# Patient Record
Sex: MALE | Race: WHITE | Employment: FULL TIME | URBAN - NONMETROPOLITAN AREA
[De-identification: names, ages, dates, MRNs, and addresses within clinical notes are randomized per-mention and may not be internally consistent; named-entity substitution may affect disease eponyms.]

---

## 2017-08-18 ENCOUNTER — HOSPITAL ENCOUNTER (OUTPATIENT)
Age: 32
Discharge: HOME | End: 2017-08-18
Payer: COMMERCIAL

## 2017-08-18 LAB
ALBUMIN SERPL-MCNC: 5.1 G/DL (ref 3.5–5.1)
ALP BLD-CCNC: 71 U/L (ref 38–126)
ALT SERPL-CCNC: 35 U/L (ref 11–66)
ANION GAP SERPL CALCULATED.3IONS-SCNC: 16 MEQ/L (ref 8–16)
AST SERPL-CCNC: 23 U/L (ref 5–40)
BASOPHILS # BLD: 0.8 %
BASOPHILS ABSOLUTE: 0.1 THOU/MM3 (ref 0–0.1)
BILIRUB SERPL-MCNC: 1.1 MG/DL (ref 0.3–1.2)
BILIRUBIN DIRECT: < 0.2 MG/DL (ref 0–0.3)
BILIRUBIN URINE: NEGATIVE
BLOOD, URINE: NEGATIVE
BUN BLDV-MCNC: 20 MG/DL (ref 7–22)
CALCIUM SERPL-MCNC: 10.2 MG/DL (ref 8.5–10.5)
CHARACTER, URINE: CLEAR
CHLORIDE BLD-SCNC: 101 MEQ/L (ref 98–111)
CO2: 25 MEQ/L (ref 23–33)
COLOR: YELLOW
CREAT SERPL-MCNC: 1.6 MG/DL (ref 0.4–1.2)
CREATININE URINE: 201.9 MG/DL
EOSINOPHIL # BLD: 0.9 %
EOSINOPHILS ABSOLUTE: 0.1 THOU/MM3 (ref 0–0.4)
GFR SERPL CREATININE-BSD FRML MDRD: 50 ML/MIN/1.73M2
GLUCOSE BLD-MCNC: 89 MG/DL (ref 70–108)
GLUCOSE URINE: NEGATIVE MG/DL
HCT VFR BLD CALC: 51.4 % (ref 42–52)
HEMOGLOBIN: 17.7 GM/DL (ref 14–18)
KETONES, URINE: NEGATIVE
LEUKOCYTE ESTERASE, URINE: NEGATIVE
LYMPHOCYTES # BLD: 16.1 %
LYMPHOCYTES ABSOLUTE: 1.5 THOU/MM3 (ref 1–4.8)
MAGNESIUM: 2.2 MG/DL (ref 1.6–2.4)
MCH RBC QN AUTO: 30.7 PG (ref 27–31)
MCHC RBC AUTO-ENTMCNC: 34.4 GM/DL (ref 33–37)
MCV RBC AUTO: 89.1 FL (ref 80–94)
MONOCYTES # BLD: 10.1 %
MONOCYTES ABSOLUTE: 0.9 THOU/MM3 (ref 0.4–1.3)
NITRITE, URINE: NEGATIVE
NUCLEATED RED BLOOD CELLS: 0 /100 WBC
PATHOLOGIST REVIEW: 0
PDW BLD-RTO: 13.7 % (ref 11.5–14.5)
PH UA: 5.5
PHOSPHORUS: 2.2 MG/DL (ref 2.4–4.7)
PLATELET # BLD: 228 THOU/MM3 (ref 130–400)
PMV BLD AUTO: 9.4 MCM (ref 7.4–10.4)
POTASSIUM SERPL-SCNC: 3.8 MEQ/L (ref 3.5–5.2)
PROT/CREAT RATIO, UR: 0.11
PROTEIN UA: NEGATIVE
PROTEIN, URINE: 21.2 MG/DL
RBC # BLD: 5.77 MILL/MM3 (ref 4.7–6.1)
RBC # BLD: NORMAL 10*6/UL
SEG NEUTROPHILS: 72.1 %
SEGMENTED NEUTROPHILS ABSOLUTE COUNT: 6.6 THOU/MM3 (ref 1.8–7.7)
SODIUM BLD-SCNC: 142 MEQ/L (ref 135–145)
SPECIFIC GRAVITY, URINE: 1.03 (ref 1–1.03)
TOTAL PROTEIN: 7.9 G/DL (ref 6.1–8)
UROBILINOGEN, URINE: 0.2 EU/DL
WBC # BLD: 9.1 THOU/MM3 (ref 4.8–10.8)

## 2017-08-18 PROCEDURE — 82248 BILIRUBIN DIRECT: CPT

## 2017-08-18 PROCEDURE — 80069 RENAL FUNCTION PANEL: CPT

## 2017-08-18 PROCEDURE — 84450 TRANSFERASE (AST) (SGOT): CPT

## 2017-08-18 PROCEDURE — 83735 ASSAY OF MAGNESIUM: CPT

## 2017-08-18 PROCEDURE — 84075 ASSAY ALKALINE PHOSPHATASE: CPT

## 2017-08-18 PROCEDURE — 81003 URINALYSIS AUTO W/O SCOPE: CPT

## 2017-08-18 PROCEDURE — 36415 COLL VENOUS BLD VENIPUNCTURE: CPT

## 2017-08-18 PROCEDURE — 82247 BILIRUBIN TOTAL: CPT

## 2017-08-18 PROCEDURE — 84460 ALANINE AMINO (ALT) (SGPT): CPT

## 2017-08-18 PROCEDURE — 80197 ASSAY OF TACROLIMUS: CPT

## 2017-08-18 PROCEDURE — 84155 ASSAY OF PROTEIN SERUM: CPT

## 2017-08-18 PROCEDURE — 85025 COMPLETE CBC W/AUTO DIFF WBC: CPT

## 2017-08-20 LAB — TACROLIMUS BLOOD: < 1 NG/ML

## 2018-01-01 ENCOUNTER — OFFICE VISIT (OUTPATIENT)
Dept: FAMILY MEDICINE CLINIC | Age: 33
End: 2018-01-01
Payer: COMMERCIAL

## 2018-01-01 VITALS
TEMPERATURE: 98.4 F | DIASTOLIC BLOOD PRESSURE: 70 MMHG | RESPIRATION RATE: 20 BRPM | HEIGHT: 68 IN | BODY MASS INDEX: 26.37 KG/M2 | SYSTOLIC BLOOD PRESSURE: 110 MMHG | WEIGHT: 174 LBS | HEART RATE: 96 BPM

## 2018-01-01 DIAGNOSIS — J96.00 ACUTE RESPIRATORY FAILURE, UNSPECIFIED WHETHER WITH HYPOXIA OR HYPERCAPNIA (HCC): ICD-10-CM

## 2018-01-01 DIAGNOSIS — N18.6 ESRD NEEDING DIALYSIS (HCC): Primary | ICD-10-CM

## 2018-01-01 DIAGNOSIS — Z99.2 ESRD NEEDING DIALYSIS (HCC): Primary | ICD-10-CM

## 2018-01-01 DIAGNOSIS — Z94.0 HISTORY OF RENAL TRANSPLANT: ICD-10-CM

## 2018-01-01 DIAGNOSIS — J81.0 ACUTE PULMONARY EDEMA (HCC): ICD-10-CM

## 2018-01-01 LAB
AFB CULTURE & SMEAR: NORMAL
FUNGUS IDENTIFIED: NORMAL
FUNGUS IDENTIFIED: NORMAL
FUNGUS SMEAR: NORMAL
FUNGUS SMEAR: NORMAL

## 2018-01-01 PROCEDURE — 99495 TRANSJ CARE MGMT MOD F2F 14D: CPT | Performed by: FAMILY MEDICINE

## 2018-01-01 PROCEDURE — 1111F DSCHRG MED/CURRENT MED MERGE: CPT | Performed by: FAMILY MEDICINE

## 2018-01-01 RX ORDER — DAPSONE 25 MG/1
50 TABLET ORAL DAILY
COMMUNITY
End: 2019-01-01 | Stop reason: ALTCHOICE

## 2018-01-01 RX ORDER — CARVEDILOL 12.5 MG/1
12.5 TABLET ORAL DAILY
COMMUNITY
End: 2019-01-01

## 2018-01-01 RX ORDER — POLYETHYLENE GLYCOL 3350 17 G/17G
17 POWDER, FOR SOLUTION ORAL DAILY
COMMUNITY

## 2018-01-01 RX ORDER — HYDRALAZINE HYDROCHLORIDE 50 MG/1
50 TABLET, FILM COATED ORAL 3 TIMES DAILY
COMMUNITY

## 2018-01-16 ENCOUNTER — OFFICE VISIT (OUTPATIENT)
Dept: CARDIOLOGY CLINIC | Age: 33
End: 2018-01-16
Payer: COMMERCIAL

## 2018-01-16 VITALS
SYSTOLIC BLOOD PRESSURE: 138 MMHG | HEIGHT: 68 IN | BODY MASS INDEX: 32.43 KG/M2 | DIASTOLIC BLOOD PRESSURE: 96 MMHG | HEART RATE: 122 BPM | WEIGHT: 214 LBS

## 2018-01-16 DIAGNOSIS — I51.9 LEFT VENTRICULAR SYSTOLIC DYSFUNCTION: Primary | ICD-10-CM

## 2018-01-16 DIAGNOSIS — I42.9 CARDIOMYOPATHY, UNSPECIFIED TYPE (HCC): ICD-10-CM

## 2018-01-16 DIAGNOSIS — E78.5 DYSLIPIDEMIA: ICD-10-CM

## 2018-01-16 DIAGNOSIS — I10 ESSENTIAL HYPERTENSION: ICD-10-CM

## 2018-01-16 PROCEDURE — G8484 FLU IMMUNIZE NO ADMIN: HCPCS | Performed by: INTERNAL MEDICINE

## 2018-01-16 PROCEDURE — G8427 DOCREV CUR MEDS BY ELIG CLIN: HCPCS | Performed by: INTERNAL MEDICINE

## 2018-01-16 PROCEDURE — 99213 OFFICE O/P EST LOW 20 MIN: CPT | Performed by: INTERNAL MEDICINE

## 2018-01-16 PROCEDURE — 4004F PT TOBACCO SCREEN RCVD TLK: CPT | Performed by: INTERNAL MEDICINE

## 2018-01-16 PROCEDURE — 93000 ELECTROCARDIOGRAM COMPLETE: CPT | Performed by: INTERNAL MEDICINE

## 2018-01-16 PROCEDURE — G8417 CALC BMI ABV UP PARAM F/U: HCPCS | Performed by: INTERNAL MEDICINE

## 2018-01-16 ASSESSMENT — ENCOUNTER SYMPTOMS
GASTROINTESTINAL NEGATIVE: 1
EYES NEGATIVE: 1
RESPIRATORY NEGATIVE: 1

## 2018-01-16 NOTE — PROGRESS NOTES
Encounters:   01/16/18 214 lb (97.1 kg)   12/06/16 211 lb 12.8 oz (96.1 kg)   10/24/16 224 lb 9.6 oz (101.9 kg)     BP Readings from Last 3 Encounters:   01/16/18 (!) 144/100   12/06/16 (!) 165/102   10/24/16 (!) 126/94       Lab Data  CBC:   Lab Results   Component Value Date    WBC 9.1 08/18/2017    RBC 5.77 08/18/2017    HGB 17.7 08/18/2017    HGB 16.9 04/14/2017    HGB 17.3 01/19/2017    HCT 51.4 08/18/2017    MCV 89.1 08/18/2017    MCH 30.7 08/18/2017    MCHC 34.4 08/18/2017    RDW 13.7 08/18/2017     08/18/2017     04/14/2017     01/19/2017    MPV 9.4 08/18/2017     CMP:    Lab Results   Component Value Date     08/18/2017    K 3.8 08/18/2017     08/18/2017    CO2 25 08/18/2017    BUN 20 08/18/2017    CREATININE 1.6 08/18/2017    CREATININE 1.4 04/14/2017    CREATININE 1.5 01/19/2017    LABGLOM 50 08/18/2017    GLUCOSE 89 08/18/2017    PROT 7.9 08/18/2017    LABALBU 5.1 08/18/2017    CALCIUM 10.2 08/18/2017    BILITOT 1.1 08/18/2017    ALKPHOS 71 08/18/2017    AST 23 08/18/2017    ALT 35 08/18/2017     Hepatic Function Panel:    Lab Results   Component Value Date    ALKPHOS 71 08/18/2017    ALT 35 08/18/2017    AST 23 08/18/2017    PROT 7.9 08/18/2017    BILITOT 1.1 08/18/2017    BILIDIR <0.2 08/18/2017    LABALBU 5.1 08/18/2017     Magnesium:    Lab Results   Component Value Date    MG 2.2 08/18/2017     PT/INR:    Lab Results   Component Value Date    INR 0.93 05/21/2014    INR 0.99 04/15/2014    INR 0.98 06/18/2013     HgBA1c:  No results found for: LABA1C  FLP:    Lab Results   Component Value Date    TRIG 255 02/24/2014    TRIG 462 01/16/2014    TRIG 146 06/18/2013    HDL 29 02/24/2014    HDL 28 01/16/2014    HDL 36 06/18/2013    LDLCALC 45 02/24/2014    LDLCALC 111 06/18/2013    LDLDIRECT 52.00 02/24/2014    LDLDIRECT 44.00 01/16/2014     TSH:    Lab Results   Component Value Date    TSH 0.913 06/19/2013     No results for input(s): CKTOTAL, CKMB, CKMBINDEX, TROPONINI in the last 72 hours. Assessment:     Samir Leon is a 33 y.o. male who was initially seen at the hospital with new onset of accelerated hypertension, newly diagnosed cardiomyopathy, and acute renal failure. Patient was treated for his acute renal injury and ended up having a renal transplant. His cardiac condition improved since then. Patient states he is feeling good. Patient went for follow up for kidney transplant follow up @ [place]. He is under the care of the nephrologist for his acute renal injury and failure. He did have a biopsy. He is status post renal transplant. Had his transplant October 2014. His brother was his donor. We treated his cardiomyopathy with the assumption that it is nonischemic cardiomyopathy. A Lexiscan stress test with myocardial perfusion study ruled out ischemic cause. Ejection fraction did improve significantly up to 37% and then to 45% in May of 2015. Blood pressure is well-controlled today    Patient's lipid profile was not ideal and he had slightly elevated LDL levels with low HDL levels. I had started him on Lipitor 20 mg daily. He is tolerating it well. Echocardiogram 5/2015 showed:  Left ventricle:  Size was normal.  Systolic function was mildly to moderately reduced. Ejection fraction was estimated to be 45 %. There was mild to moderate diffuse hypokinesis. Tricuspid valve: There was mild regurgitation. The following diagnoses were addressed during this visit:  1. Left ventricular systolic dysfunction  EKG 12 Lead   2. Cardiomyopathy, unspecified type ([address]     3. Essential hypertension     4. Dyslipidemia         Plan:     Samir Leon BP is high and nephrologists just increased metoprolol to 100 mg. I will increase it to 100 mg twice daily. He is tachycardic. He will continue to follow up with nephrology as well.   Orders Placed:  Orders Placed This Encounter   Procedures    EKG 12 Lead     Order Specific Question:   Reason for Exam?

## 2018-02-05 ENCOUNTER — OFFICE VISIT (OUTPATIENT)
Dept: FAMILY MEDICINE CLINIC | Age: 33
End: 2018-02-05
Payer: COMMERCIAL

## 2018-02-05 VITALS
RESPIRATION RATE: 16 BRPM | SYSTOLIC BLOOD PRESSURE: 116 MMHG | BODY MASS INDEX: 32.25 KG/M2 | HEART RATE: 96 BPM | HEIGHT: 68 IN | WEIGHT: 212.8 LBS | TEMPERATURE: 98.2 F | DIASTOLIC BLOOD PRESSURE: 88 MMHG

## 2018-02-05 DIAGNOSIS — J01.00 ACUTE MAXILLARY SINUSITIS, RECURRENCE NOT SPECIFIED: Primary | ICD-10-CM

## 2018-02-05 PROCEDURE — 99213 OFFICE O/P EST LOW 20 MIN: CPT | Performed by: NURSE PRACTITIONER

## 2018-02-05 RX ORDER — AMOXICILLIN AND CLAVULANATE POTASSIUM 875; 125 MG/1; MG/1
1 TABLET, FILM COATED ORAL 2 TIMES DAILY
Qty: 20 TABLET | Refills: 0 | Status: SHIPPED | OUTPATIENT
Start: 2018-02-05 | End: 2018-02-15

## 2018-02-05 ASSESSMENT — ENCOUNTER SYMPTOMS
EYE ITCHING: 0
WHEEZING: 0
VOMITING: 0
BACK PAIN: 0
EYE DISCHARGE: 0
SINUS PRESSURE: 1
EYE PAIN: 0
TROUBLE SWALLOWING: 0
COUGH: 1
EYE REDNESS: 0
DIARRHEA: 0
CHEST TIGHTNESS: 0
ABDOMINAL PAIN: 0
RHINORRHEA: 0
SORE THROAT: 1
NAUSEA: 0
SHORTNESS OF BREATH: 0
CONSTIPATION: 0

## 2018-02-05 ASSESSMENT — PATIENT HEALTH QUESTIONNAIRE - PHQ9
SUM OF ALL RESPONSES TO PHQ QUESTIONS 1-9: 0
2. FEELING DOWN, DEPRESSED OR HOPELESS: 0
SUM OF ALL RESPONSES TO PHQ9 QUESTIONS 1 & 2: 0
1. LITTLE INTEREST OR PLEASURE IN DOING THINGS: 0

## 2018-02-05 NOTE — PATIENT INSTRUCTIONS
teaspoon of baking soda to 2 cups of distilled water. If you make your own, fill a bulb syringe with the solution, insert the tip into your nostril, and squeeze gently. Philip Reyes your nose. · Put a hot, wet towel or a warm gel pack on your face 3 or 4 times a day for 5 to 10 minutes each time. · Try a decongestant nasal spray like oxymetazoline (Afrin). Do not use it for more than 3 days in a row. Using it for more than 3 days can make your congestion worse. When should you call for help? Call your doctor now or seek immediate medical care if:  ? · You have new or worse swelling or redness in your face or around your eyes. ? · You have a new or higher fever. ? Watch closely for changes in your health, and be sure to contact your doctor if:  ? · You have new or worse facial pain. ? · The mucus from your nose becomes thicker (like pus) or has new blood in it. ? · You are not getting better as expected. Where can you learn more? Go to [email or web address] and sign in to your [employer] account. Enter H714 in the P4RC box to learn more about \"Sinusitis: Care Instructions. \"     If you do not have an account, please click on the \"Sign Up Now\" link. Current as of: May 12, 2017  Content Version: 11.5  © 3654-1479 Healthwise, [employer]. Care instructions adapted under license by [facility] [address] If you have questions about a medical condition or this instruction, always ask your healthcare professional. [place] [address] any warranty or liability for your use of this information.

## 2018-02-05 NOTE — PROGRESS NOTES
[address] [facility]  [address] [place] [address] Rd., [phone] [id number]  Dept: [phone]  Dept Fax: [phone]  Loc: [phone]      Karlene Don is a 27 y.o. male who presents today for Cough (here today for a painful cough, sinus pressure, headache, drainage, runny nose x 2 days. Tried robitussin)      HPI:     Sinusitis   This is a new problem. The current episode started in the past 7 days. The problem has been gradually worsening since onset. There has been no fever. His pain is at a severity of 4/10. The pain is mild. Associated symptoms include chills, coughing, sinus pressure and a sore throat. Pertinent negatives include no congestion, ear pain, headaches or shortness of breath. Past treatments include acetaminophen. The treatment provided mild relief. The patient is allergic to sulfa antibiotics. Past Medical History  Manjinder Barrera  has a past medical history of Cardiomyopathy ([facility] Chronic kidney disease, stage V ([facility] Dyslipidemia; FSGS (focal segmental glomerulosclerosis); H/O kidney transplant; History of renal transplant; Hypertension; and Left ventricular systolic dysfunction. Medications    Current Outpatient Prescriptions:     amoxicillin-clavulanate (AUGMENTIN) 875-125 MG per tablet, Take 1 tablet by mouth 2 times daily for 10 days, Disp: 20 tablet, Rfl: 0    metoprolol (LOPRESSOR) 100 MG tablet, Take 100 mg by mouth daily, Disp: , Rfl:     predniSONE (DELTASONE) 5 MG tablet, Take 2.5 mg by mouth daily , Disp: , Rfl:     mycophenolate (CELLCEPT) 250 MG capsule, Take 750 mg by mouth 2 times daily , Disp: , Rfl:     tacrolimus (PROGRAF) 1 MG capsule,  Take 4 mg by mouth 2 times daily Take 4 am take 3 pm, Disp: , Rfl:     atorvastatin (LIPITOR) 20 MG tablet, Take 1 tablet by mouth daily. , Disp: 90 tablet, Rfl: 3    vitamin D3 (CHOLECALCIFEROL) 1000 UNITS TABS tablet, Take 1 tablet by mouth daily. , Disp: 30 tablet, Rfl: 0    fexofenadine (ALLEGRA) 60 erythema present. No oropharyngeal exudate, posterior oropharyngeal edema or tonsillar abscesses. Eyes: Conjunctivae and EOM are normal. Pupils are equal, round, and reactive to light. Neck: Normal range of motion. Neck supple. No JVD present. No tracheal deviation present. No thyromegaly present. Cardiovascular: Normal rate, regular rhythm and normal heart sounds. Exam reveals no gallop and no friction rub. No murmur heard. Pulmonary/Chest: Effort normal. No stridor. No respiratory distress. He has no wheezes. He has no rales. He exhibits no tenderness. Coarse breath sounds   Lymphadenopathy:     He has no cervical adenopathy. Neurological: He is alert and oriented to person, place, and time. Skin: Skin is warm and dry. Psychiatric: He has a normal mood and affect. His behavior is normal. Judgment normal.   Nursing note and vitals reviewed. Assessment/Plan:      Liam Peña was seen today for cough. Diagnoses and all orders for this visit:    Acute maxillary sinusitis, recurrence not specified  -     amoxicillin-clavulanate (AUGMENTIN) 875-125 MG per tablet; Take 1 tablet by mouth 2 times daily for 10 days    Patient given educational materials - see patient instructions. Discussed use, benefit, and side effects of prescribed medications. All patient questions answered. Pt voiced understanding. Reviewed health maintenance. Patient agreed with treatment plan. Follow up as directed.        Medications as directed. Tylenol motrin as needed. Increase fluids. Salt water gargles  Probiotic 2 hours after antibiotic and for 2 weeks after antibiotic. Call if no improvement. No Follow-up on file. Patient instructions given and reviewed.      Electronically signed by Danielle Baxter CNP on 2/5/2018 at 1:10 PM

## 2018-03-05 ENCOUNTER — HOSPITAL ENCOUNTER (OUTPATIENT)
Age: 33
Discharge: HOME | End: 2018-03-05
Payer: COMMERCIAL

## 2018-03-05 LAB
ALBUMIN SERPL-MCNC: 5.2 G/DL (ref 3.5–5.1)
ALP BLD-CCNC: 55 U/L (ref 38–126)
ALT SERPL-CCNC: 15 U/L (ref 11–66)
ANION GAP SERPL CALCULATED.3IONS-SCNC: 14 MEQ/L (ref 8–16)
AST SERPL-CCNC: 16 U/L (ref 5–40)
AVERAGE GLUCOSE: 84 MG/DL (ref 70–126)
BASOPHILS # BLD: 0.7 %
BASOPHILS ABSOLUTE: 0 THOU/MM3 (ref 0–0.1)
BILIRUB SERPL-MCNC: 0.9 MG/DL (ref 0.3–1.2)
BILIRUBIN DIRECT: < 0.2 MG/DL (ref 0–0.3)
BILIRUBIN URINE: NEGATIVE
BLOOD, URINE: NEGATIVE
BUN BLDV-MCNC: 25 MG/DL (ref 7–22)
CALCIUM SERPL-MCNC: 10.1 MG/DL (ref 8.5–10.5)
CHARACTER, URINE: CLEAR
CHLORIDE BLD-SCNC: 101 MEQ/L (ref 98–111)
CHOLESTEROL, TOTAL: 159 MG/DL (ref 100–199)
CO2: 26 MEQ/L (ref 23–33)
COLOR: YELLOW
CREAT SERPL-MCNC: 1.6 MG/DL (ref 0.4–1.2)
CREATININE URINE: 97.6 MG/DL
EOSINOPHIL # BLD: 1.3 %
EOSINOPHILS ABSOLUTE: 0.1 THOU/MM3 (ref 0–0.4)
GFR SERPL CREATININE-BSD FRML MDRD: 50 ML/MIN/1.73M2
GLUCOSE BLD-MCNC: 92 MG/DL (ref 70–108)
GLUCOSE URINE: NEGATIVE MG/DL
HBA1C MFR BLD: 4.8 % (ref 4.4–6.4)
HCT VFR BLD CALC: 45.7 % (ref 42–52)
HDLC SERPL-MCNC: 34 MG/DL
HEMOGLOBIN: 15.9 GM/DL (ref 14–18)
KETONES, URINE: NEGATIVE
LDL CHOLESTEROL CALCULATED: 85 MG/DL
LEUKOCYTE ESTERASE, URINE: NEGATIVE
LYMPHOCYTES # BLD: 17.9 %
LYMPHOCYTES ABSOLUTE: 1.2 THOU/MM3 (ref 1–4.8)
MAGNESIUM: 2.1 MG/DL (ref 1.6–2.4)
MCH RBC QN AUTO: 29.8 PG (ref 27–31)
MCHC RBC AUTO-ENTMCNC: 34.7 GM/DL (ref 33–37)
MCV RBC AUTO: 86 FL (ref 80–94)
MONOCYTES # BLD: 7.5 %
MONOCYTES ABSOLUTE: 0.5 THOU/MM3 (ref 0.4–1.3)
NITRITE, URINE: NEGATIVE
NUCLEATED RED BLOOD CELLS: 0 /100 WBC
PDW BLD-RTO: 13.5 % (ref 11.5–14.5)
PH UA: 5.5
PHOSPHORUS: 2.6 MG/DL (ref 2.4–4.7)
PLATELET # BLD: 200 THOU/MM3 (ref 130–400)
PMV BLD AUTO: 9.4 FL (ref 7.4–10.4)
POTASSIUM SERPL-SCNC: 4 MEQ/L (ref 3.5–5.2)
PROT/CREAT RATIO, UR: 0.18
PROTEIN UA: NEGATIVE
PROTEIN, URINE: 18 MG/DL
RBC # BLD: 5.32 MILL/MM3 (ref 4.7–6.1)
SEG NEUTROPHILS: 72.6 %
SEGMENTED NEUTROPHILS ABSOLUTE COUNT: 4.7 THOU/MM3 (ref 1.8–7.7)
SODIUM BLD-SCNC: 141 MEQ/L (ref 135–145)
SPECIFIC GRAVITY, URINE: 1.02 (ref 1–1.03)
TOTAL PROTEIN: 7.7 G/DL (ref 6.1–8)
TRIGL SERPL-MCNC: 198 MG/DL (ref 0–199)
UROBILINOGEN, URINE: 0.2 EU/DL
WBC # BLD: 6.5 THOU/MM3 (ref 4.8–10.8)

## 2018-03-05 PROCEDURE — 84156 ASSAY OF PROTEIN URINE: CPT

## 2018-03-05 PROCEDURE — 85025 COMPLETE CBC W/AUTO DIFF WBC: CPT

## 2018-03-05 PROCEDURE — 84450 TRANSFERASE (AST) (SGOT): CPT

## 2018-03-05 PROCEDURE — 80197 ASSAY OF TACROLIMUS: CPT

## 2018-03-05 PROCEDURE — 36415 COLL VENOUS BLD VENIPUNCTURE: CPT

## 2018-03-05 PROCEDURE — 84075 ASSAY ALKALINE PHOSPHATASE: CPT

## 2018-03-05 PROCEDURE — 82247 BILIRUBIN TOTAL: CPT

## 2018-03-05 PROCEDURE — 83735 ASSAY OF MAGNESIUM: CPT

## 2018-03-05 PROCEDURE — 84460 ALANINE AMINO (ALT) (SGPT): CPT

## 2018-03-05 PROCEDURE — 80061 LIPID PANEL: CPT

## 2018-03-05 PROCEDURE — 82248 BILIRUBIN DIRECT: CPT

## 2018-03-05 PROCEDURE — 83036 HEMOGLOBIN GLYCOSYLATED A1C: CPT

## 2018-03-05 PROCEDURE — 84155 ASSAY OF PROTEIN SERUM: CPT

## 2018-03-05 PROCEDURE — 82570 ASSAY OF URINE CREATININE: CPT

## 2018-03-05 PROCEDURE — 80069 RENAL FUNCTION PANEL: CPT

## 2018-03-05 PROCEDURE — 81003 URINALYSIS AUTO W/O SCOPE: CPT

## 2018-03-07 LAB — TACROLIMUS BLOOD: < 1 NG/ML

## 2018-05-02 ENCOUNTER — HOSPITAL ENCOUNTER (OUTPATIENT)
Age: 33
Discharge: HOME | End: 2018-05-02
Payer: COMMERCIAL

## 2018-05-02 LAB
ALBUMIN SERPL-MCNC: 4.7 G/DL (ref 3.5–5.1)
ALP BLD-CCNC: 53 U/L (ref 38–126)
ALT SERPL-CCNC: 13 U/L (ref 11–66)
ANION GAP SERPL CALCULATED.3IONS-SCNC: 13 MEQ/L (ref 8–16)
AST SERPL-CCNC: 13 U/L (ref 5–40)
AVERAGE GLUCOSE: 93 MG/DL (ref 70–126)
BACTERIA: ABNORMAL /HPF
BASOPHILS # BLD: 0.7 %
BASOPHILS ABSOLUTE: 0 THOU/MM3 (ref 0–0.1)
BILIRUB SERPL-MCNC: 0.6 MG/DL (ref 0.3–1.2)
BILIRUBIN DIRECT: < 0.2 MG/DL (ref 0–0.3)
BILIRUBIN URINE: NEGATIVE
BLOOD, URINE: NEGATIVE
BUN BLDV-MCNC: 36 MG/DL (ref 7–22)
CALCIUM SERPL-MCNC: 9.5 MG/DL (ref 8.5–10.5)
CASTS 2: ABNORMAL /LPF
CASTS UA: ABNORMAL /LPF
CHARACTER, URINE: CLEAR
CHLORIDE BLD-SCNC: 110 MEQ/L (ref 98–111)
CHOLESTEROL, TOTAL: 139 MG/DL (ref 100–199)
CO2: 19 MEQ/L (ref 23–33)
COLOR: YELLOW
CREAT SERPL-MCNC: 2.2 MG/DL (ref 0.4–1.2)
CREATININE URINE: 185.2 MG/DL
CRYSTALS, UA: ABNORMAL
EOSINOPHIL # BLD: 1.9 %
EOSINOPHILS ABSOLUTE: 0.1 THOU/MM3 (ref 0–0.4)
EPITHELIAL CELLS, UA: ABNORMAL /HPF
GFR SERPL CREATININE-BSD FRML MDRD: 34 ML/MIN/1.73M2
GLUCOSE BLD-MCNC: 125 MG/DL (ref 70–108)
GLUCOSE URINE: NEGATIVE MG/DL
HBA1C MFR BLD: 5.1 % (ref 4.4–6.4)
HCT VFR BLD CALC: 41.7 % (ref 42–52)
HDLC SERPL-MCNC: 24 MG/DL
HEMOGLOBIN: 14.4 GM/DL (ref 14–18)
KETONES, URINE: NEGATIVE
LDL CHOLESTEROL CALCULATED: 63 MG/DL
LEUKOCYTE ESTERASE, URINE: NEGATIVE
LYMPHOCYTES # BLD: 21.9 %
LYMPHOCYTES ABSOLUTE: 1.3 THOU/MM3 (ref 1–4.8)
MAGNESIUM: 1.9 MG/DL (ref 1.6–2.4)
MCH RBC QN AUTO: 29.4 PG (ref 27–31)
MCHC RBC AUTO-ENTMCNC: 34.6 GM/DL (ref 33–37)
MCV RBC AUTO: 84.9 FL (ref 80–94)
MISCELLANEOUS 2: ABNORMAL
MONOCYTES # BLD: 8.4 %
MONOCYTES ABSOLUTE: 0.5 THOU/MM3 (ref 0.4–1.3)
NITRITE, URINE: NEGATIVE
NUCLEATED RED BLOOD CELLS: 0 /100 WBC
PDW BLD-RTO: 13.2 % (ref 11.5–14.5)
PH UA: 5.5
PHOSPHORUS: 3.6 MG/DL (ref 2.4–4.7)
PLATELET # BLD: 210 THOU/MM3 (ref 130–400)
PMV BLD AUTO: 9.4 FL (ref 7.4–10.4)
POTASSIUM SERPL-SCNC: 4.8 MEQ/L (ref 3.5–5.2)
PROT/CREAT RATIO, UR: 0.27
PROTEIN UA: 30
PROTEIN, URINE: 49.5 MG/DL
RBC # BLD: 4.91 MILL/MM3 (ref 4.7–6.1)
RBC URINE: ABNORMAL /HPF
RENAL EPITHELIAL, UA: ABNORMAL
SEG NEUTROPHILS: 67.1 %
SEGMENTED NEUTROPHILS ABSOLUTE COUNT: 4.1 THOU/MM3 (ref 1.8–7.7)
SODIUM BLD-SCNC: 142 MEQ/L (ref 135–145)
SPECIFIC GRAVITY, URINE: 1.02 (ref 1–1.03)
TOTAL PROTEIN: 7.2 G/DL (ref 6.1–8)
TRIGL SERPL-MCNC: 260 MG/DL (ref 0–199)
UROBILINOGEN, URINE: 0.2 EU/DL
WBC # BLD: 6.1 THOU/MM3 (ref 4.8–10.8)
WBC UA: ABNORMAL /HPF
YEAST: ABNORMAL

## 2018-05-02 PROCEDURE — 82570 ASSAY OF URINE CREATININE: CPT

## 2018-05-02 PROCEDURE — 84460 ALANINE AMINO (ALT) (SGPT): CPT

## 2018-05-02 PROCEDURE — 85025 COMPLETE CBC W/AUTO DIFF WBC: CPT

## 2018-05-02 PROCEDURE — 84075 ASSAY ALKALINE PHOSPHATASE: CPT

## 2018-05-02 PROCEDURE — 80069 RENAL FUNCTION PANEL: CPT

## 2018-05-02 PROCEDURE — 80197 ASSAY OF TACROLIMUS: CPT

## 2018-05-02 PROCEDURE — 36415 COLL VENOUS BLD VENIPUNCTURE: CPT

## 2018-05-02 PROCEDURE — 81001 URINALYSIS AUTO W/SCOPE: CPT

## 2018-05-02 PROCEDURE — 84155 ASSAY OF PROTEIN SERUM: CPT

## 2018-05-02 PROCEDURE — 82248 BILIRUBIN DIRECT: CPT

## 2018-05-02 PROCEDURE — 80061 LIPID PANEL: CPT

## 2018-05-02 PROCEDURE — 84450 TRANSFERASE (AST) (SGOT): CPT

## 2018-05-02 PROCEDURE — 82247 BILIRUBIN TOTAL: CPT

## 2018-05-02 PROCEDURE — 83735 ASSAY OF MAGNESIUM: CPT

## 2018-05-02 PROCEDURE — 84156 ASSAY OF PROTEIN URINE: CPT

## 2018-05-02 PROCEDURE — 83036 HEMOGLOBIN GLYCOSYLATED A1C: CPT

## 2018-05-04 LAB — TACROLIMUS BLOOD: 7.3 NG/ML

## 2018-05-23 ENCOUNTER — HOSPITAL ENCOUNTER (OUTPATIENT)
Age: 33
Discharge: HOME | End: 2018-05-23
Payer: COMMERCIAL

## 2018-05-23 LAB
ALBUMIN SERPL-MCNC: 4.6 G/DL (ref 3.5–5.1)
ALP BLD-CCNC: 52 U/L (ref 38–126)
ALT SERPL-CCNC: 13 U/L (ref 11–66)
ANION GAP SERPL CALCULATED.3IONS-SCNC: 12 MEQ/L (ref 8–16)
AST SERPL-CCNC: 16 U/L (ref 5–40)
AVERAGE GLUCOSE: 87 MG/DL (ref 70–126)
BACTERIA: ABNORMAL /HPF
BASOPHILS # BLD: 0.8 %
BASOPHILS ABSOLUTE: 0 THOU/MM3 (ref 0–0.1)
BILIRUB SERPL-MCNC: 0.8 MG/DL (ref 0.3–1.2)
BILIRUBIN DIRECT: < 0.2 MG/DL (ref 0–0.3)
BILIRUBIN URINE: NEGATIVE
BLOOD, URINE: ABNORMAL
BUN BLDV-MCNC: 36 MG/DL (ref 7–22)
CALCIUM SERPL-MCNC: 9.5 MG/DL (ref 8.5–10.5)
CASTS 2: ABNORMAL /LPF
CASTS UA: ABNORMAL /LPF
CHARACTER, URINE: CLEAR
CHLORIDE BLD-SCNC: 105 MEQ/L (ref 98–111)
CHOLESTEROL, TOTAL: 160 MG/DL (ref 100–199)
CO2: 22 MEQ/L (ref 23–33)
COLOR: YELLOW
CREAT SERPL-MCNC: 1.9 MG/DL (ref 0.4–1.2)
CREATININE URINE: 94.6 MG/DL
CRYSTALS, UA: ABNORMAL
EOSINOPHIL # BLD: 1.4 %
EOSINOPHILS ABSOLUTE: 0.1 THOU/MM3 (ref 0–0.4)
EPITHELIAL CELLS, UA: ABNORMAL /HPF
GFR SERPL CREATININE-BSD FRML MDRD: 41 ML/MIN/1.73M2
GLUCOSE BLD-MCNC: 93 MG/DL (ref 70–108)
GLUCOSE URINE: NEGATIVE MG/DL
HBA1C MFR BLD: 4.9 % (ref 4.4–6.4)
HCT VFR BLD CALC: 40.2 % (ref 42–52)
HDLC SERPL-MCNC: 31 MG/DL
HEMOGLOBIN: 13.9 GM/DL (ref 14–18)
KETONES, URINE: NEGATIVE
LDL CHOLESTEROL CALCULATED: 73 MG/DL
LEUKOCYTE ESTERASE, URINE: NEGATIVE
LYMPHOCYTES # BLD: 20.4 %
LYMPHOCYTES ABSOLUTE: 1.3 THOU/MM3 (ref 1–4.8)
MAGNESIUM: 1.9 MG/DL (ref 1.6–2.4)
MCH RBC QN AUTO: 29.4 PG (ref 27–31)
MCHC RBC AUTO-ENTMCNC: 34.5 GM/DL (ref 33–37)
MCV RBC AUTO: 85.1 FL (ref 80–94)
MISCELLANEOUS 2: ABNORMAL
MONOCYTES # BLD: 8.6 %
MONOCYTES ABSOLUTE: 0.5 THOU/MM3 (ref 0.4–1.3)
NITRITE, URINE: NEGATIVE
NUCLEATED RED BLOOD CELLS: 0 /100 WBC
PDW BLD-RTO: 13.9 % (ref 11.5–14.5)
PH UA: 5
PHOSPHORUS: 3.1 MG/DL (ref 2.4–4.7)
PLATELET # BLD: 175 THOU/MM3 (ref 130–400)
PMV BLD AUTO: 9.6 FL (ref 7.4–10.4)
POTASSIUM SERPL-SCNC: 4.5 MEQ/L (ref 3.5–5.2)
PROT/CREAT RATIO, UR: 1.89
PROTEIN UA: 300
PROTEIN, URINE: 179.2 MG/DL
RBC # BLD: 4.73 MILL/MM3 (ref 4.7–6.1)
RBC URINE: ABNORMAL /HPF
RENAL EPITHELIAL, UA: ABNORMAL
SEG NEUTROPHILS: 68.8 %
SEGMENTED NEUTROPHILS ABSOLUTE COUNT: 4.3 THOU/MM3 (ref 1.8–7.7)
SODIUM BLD-SCNC: 139 MEQ/L (ref 135–145)
SPECIFIC GRAVITY, URINE: 1.02 (ref 1–1.03)
TOTAL PROTEIN: 6.9 G/DL (ref 6.1–8)
TRIGL SERPL-MCNC: 278 MG/DL (ref 0–199)
UROBILINOGEN, URINE: 0.2 EU/DL
WBC # BLD: 6.2 THOU/MM3 (ref 4.8–10.8)
WBC UA: ABNORMAL /HPF
YEAST: ABNORMAL

## 2018-05-23 PROCEDURE — 80069 RENAL FUNCTION PANEL: CPT

## 2018-05-23 PROCEDURE — 84450 TRANSFERASE (AST) (SGOT): CPT

## 2018-05-23 PROCEDURE — 84155 ASSAY OF PROTEIN SERUM: CPT

## 2018-05-23 PROCEDURE — 85025 COMPLETE CBC W/AUTO DIFF WBC: CPT

## 2018-05-23 PROCEDURE — 36415 COLL VENOUS BLD VENIPUNCTURE: CPT

## 2018-05-23 PROCEDURE — 82570 ASSAY OF URINE CREATININE: CPT

## 2018-05-23 PROCEDURE — 82248 BILIRUBIN DIRECT: CPT

## 2018-05-23 PROCEDURE — 84075 ASSAY ALKALINE PHOSPHATASE: CPT

## 2018-05-23 PROCEDURE — 83036 HEMOGLOBIN GLYCOSYLATED A1C: CPT

## 2018-05-23 PROCEDURE — 82247 BILIRUBIN TOTAL: CPT

## 2018-05-23 PROCEDURE — 80061 LIPID PANEL: CPT

## 2018-05-23 PROCEDURE — 84460 ALANINE AMINO (ALT) (SGPT): CPT

## 2018-05-23 PROCEDURE — 81001 URINALYSIS AUTO W/SCOPE: CPT

## 2018-05-23 PROCEDURE — 84156 ASSAY OF PROTEIN URINE: CPT

## 2018-05-23 PROCEDURE — 80197 ASSAY OF TACROLIMUS: CPT

## 2018-05-23 PROCEDURE — 83735 ASSAY OF MAGNESIUM: CPT

## 2018-05-25 LAB — TACROLIMUS BLOOD: 4.8 NG/ML

## 2018-06-22 ENCOUNTER — HOSPITAL ENCOUNTER (OUTPATIENT)
Age: 33
Discharge: HOME | End: 2018-06-22
Payer: COMMERCIAL

## 2018-06-22 LAB
ALBUMIN SERPL-MCNC: 4.2 G/DL (ref 3.5–5.1)
ALP BLD-CCNC: 51 U/L (ref 38–126)
ALT SERPL-CCNC: 14 U/L (ref 11–66)
ANION GAP SERPL CALCULATED.3IONS-SCNC: 12 MEQ/L (ref 8–16)
AST SERPL-CCNC: 18 U/L (ref 5–40)
AVERAGE GLUCOSE: 81 MG/DL (ref 70–126)
BACTERIA: ABNORMAL /HPF
BASOPHILS # BLD: 0.5 %
BASOPHILS ABSOLUTE: 0 THOU/MM3 (ref 0–0.1)
BILIRUB SERPL-MCNC: 0.8 MG/DL (ref 0.3–1.2)
BILIRUBIN DIRECT: < 0.2 MG/DL (ref 0–0.3)
BILIRUBIN URINE: NEGATIVE
BLOOD, URINE: ABNORMAL
BUN BLDV-MCNC: 27 MG/DL (ref 7–22)
CALCIUM SERPL-MCNC: 9.3 MG/DL (ref 8.5–10.5)
CASTS 2: ABNORMAL /LPF
CASTS UA: ABNORMAL /LPF
CHARACTER, URINE: CLEAR
CHLORIDE BLD-SCNC: 105 MEQ/L (ref 98–111)
CHOLESTEROL, TOTAL: 149 MG/DL (ref 100–199)
CO2: 25 MEQ/L (ref 23–33)
COLOR: YELLOW
CREAT SERPL-MCNC: 2.1 MG/DL (ref 0.4–1.2)
CREATININE URINE: 106.6 MG/DL
CRYSTALS, UA: ABNORMAL
EOSINOPHIL # BLD: 2 %
EOSINOPHILS ABSOLUTE: 0.1 THOU/MM3 (ref 0–0.4)
EPITHELIAL CELLS, UA: ABNORMAL /HPF
GFR SERPL CREATININE-BSD FRML MDRD: 36 ML/MIN/1.73M2
GLUCOSE BLD-MCNC: 95 MG/DL (ref 70–108)
GLUCOSE URINE: NEGATIVE MG/DL
HBA1C MFR BLD: 4.7 % (ref 4.4–6.4)
HCT VFR BLD CALC: 37.8 % (ref 42–52)
HDLC SERPL-MCNC: 33 MG/DL
HEMOGLOBIN: 13.1 GM/DL (ref 14–18)
KETONES, URINE: NEGATIVE
LDL CHOLESTEROL CALCULATED: 73 MG/DL
LEUKOCYTE ESTERASE, URINE: NEGATIVE
LYMPHOCYTES # BLD: 18.7 %
LYMPHOCYTES ABSOLUTE: 1.3 THOU/MM3 (ref 1–4.8)
MAGNESIUM: 2.1 MG/DL (ref 1.6–2.4)
MCH RBC QN AUTO: 30.3 PG (ref 27–31)
MCHC RBC AUTO-ENTMCNC: 34.8 GM/DL (ref 33–37)
MCV RBC AUTO: 87.1 FL (ref 80–94)
MISCELLANEOUS 2: ABNORMAL
MONOCYTES # BLD: 7.8 %
MONOCYTES ABSOLUTE: 0.5 THOU/MM3 (ref 0.4–1.3)
NITRITE, URINE: NEGATIVE
NUCLEATED RED BLOOD CELLS: 0 /100 WBC
PDW BLD-RTO: 14.2 % (ref 11.5–14.5)
PH UA: 6
PHOSPHORUS: 3.2 MG/DL (ref 2.4–4.7)
PLATELET # BLD: 169 THOU/MM3 (ref 130–400)
PMV BLD AUTO: 9.4 FL (ref 7.4–10.4)
POTASSIUM SERPL-SCNC: 4.5 MEQ/L (ref 3.5–5.2)
PROT/CREAT RATIO, UR: 2.91
PROTEIN UA: 300
PROTEIN, URINE: 310.4 MG/DL
RBC # BLD: 4.34 MILL/MM3 (ref 4.7–6.1)
RBC URINE: ABNORMAL /HPF
RENAL EPITHELIAL, UA: ABNORMAL
SEG NEUTROPHILS: 71 %
SEGMENTED NEUTROPHILS ABSOLUTE COUNT: 4.8 THOU/MM3 (ref 1.8–7.7)
SODIUM BLD-SCNC: 142 MEQ/L (ref 135–145)
SPECIFIC GRAVITY, URINE: 1.01 (ref 1–1.03)
TOTAL PROTEIN: 6.3 G/DL (ref 6.1–8)
TRIGL SERPL-MCNC: 217 MG/DL (ref 0–199)
UROBILINOGEN, URINE: 0.2 EU/DL
WBC # BLD: 6.7 THOU/MM3 (ref 4.8–10.8)
WBC UA: ABNORMAL /HPF
YEAST: ABNORMAL

## 2018-06-22 PROCEDURE — 80069 RENAL FUNCTION PANEL: CPT

## 2018-06-22 PROCEDURE — 81001 URINALYSIS AUTO W/SCOPE: CPT

## 2018-06-22 PROCEDURE — 83735 ASSAY OF MAGNESIUM: CPT

## 2018-06-22 PROCEDURE — 80197 ASSAY OF TACROLIMUS: CPT

## 2018-06-22 PROCEDURE — 84155 ASSAY OF PROTEIN SERUM: CPT

## 2018-06-22 PROCEDURE — 85025 COMPLETE CBC W/AUTO DIFF WBC: CPT

## 2018-06-22 PROCEDURE — 82570 ASSAY OF URINE CREATININE: CPT

## 2018-06-22 PROCEDURE — 84156 ASSAY OF PROTEIN URINE: CPT

## 2018-06-22 PROCEDURE — 36415 COLL VENOUS BLD VENIPUNCTURE: CPT

## 2018-06-22 PROCEDURE — 84460 ALANINE AMINO (ALT) (SGPT): CPT

## 2018-06-22 PROCEDURE — 83036 HEMOGLOBIN GLYCOSYLATED A1C: CPT

## 2018-06-22 PROCEDURE — 84075 ASSAY ALKALINE PHOSPHATASE: CPT

## 2018-06-22 PROCEDURE — 80061 LIPID PANEL: CPT

## 2018-06-22 PROCEDURE — 82247 BILIRUBIN TOTAL: CPT

## 2018-06-22 PROCEDURE — 84450 TRANSFERASE (AST) (SGOT): CPT

## 2018-06-22 PROCEDURE — 82248 BILIRUBIN DIRECT: CPT

## 2018-06-24 LAB — TACROLIMUS BLOOD: 11.3 NG/ML

## 2018-07-26 ENCOUNTER — HOSPITAL ENCOUNTER (OUTPATIENT)
Age: 33
Discharge: HOME | End: 2018-07-26
Payer: COMMERCIAL

## 2018-07-26 LAB
ALBUMIN SERPL-MCNC: 4.4 G/DL (ref 3.5–5.1)
ALP BLD-CCNC: 52 U/L (ref 38–126)
ALT SERPL-CCNC: 16 U/L (ref 11–66)
ANION GAP SERPL CALCULATED.3IONS-SCNC: 15 MEQ/L (ref 8–16)
AST SERPL-CCNC: 16 U/L (ref 5–40)
AVERAGE GLUCOSE: 87 MG/DL (ref 70–126)
BACTERIA: ABNORMAL /HPF
BASOPHILS # BLD: 0.7 %
BASOPHILS ABSOLUTE: 0.1 THOU/MM3 (ref 0–0.1)
BILIRUB SERPL-MCNC: 0.4 MG/DL (ref 0.3–1.2)
BILIRUBIN DIRECT: < 0.2 MG/DL (ref 0–0.3)
BILIRUBIN URINE: NEGATIVE
BLOOD, URINE: ABNORMAL
BUN BLDV-MCNC: 41 MG/DL (ref 7–22)
CALCIUM SERPL-MCNC: 9.6 MG/DL (ref 8.5–10.5)
CASTS 2: ABNORMAL /LPF
CASTS UA: ABNORMAL /LPF
CHARACTER, URINE: CLEAR
CHLORIDE BLD-SCNC: 107 MEQ/L (ref 98–111)
CHOLESTEROL, TOTAL: 171 MG/DL (ref 100–199)
CO2: 19 MEQ/L (ref 23–33)
COLOR: YELLOW
CREAT SERPL-MCNC: 2.5 MG/DL (ref 0.4–1.2)
CREATININE URINE: 103.5 MG/DL
CRYSTALS, UA: ABNORMAL
EOSINOPHIL # BLD: 2.1 %
EOSINOPHILS ABSOLUTE: 0.2 THOU/MM3 (ref 0–0.4)
EPITHELIAL CELLS, UA: ABNORMAL /HPF
ERYTHROCYTE [DISTWIDTH] IN BLOOD BY AUTOMATED COUNT: 12.9 % (ref 11.5–14.5)
ERYTHROCYTE [DISTWIDTH] IN BLOOD BY AUTOMATED COUNT: 40.1 FL (ref 35–45)
GFR SERPL CREATININE-BSD FRML MDRD: 30 ML/MIN/1.73M2
GLUCOSE BLD-MCNC: 102 MG/DL (ref 70–108)
GLUCOSE URINE: NEGATIVE MG/DL
HBA1C MFR BLD: 4.9 % (ref 4.4–6.4)
HCT VFR BLD CALC: 37.2 % (ref 42–52)
HDLC SERPL-MCNC: 26 MG/DL
HEMOGLOBIN: 12.8 GM/DL (ref 14–18)
IMMATURE GRANS (ABS): 0.03 THOU/MM3 (ref 0–0.07)
IMMATURE GRANULOCYTES: 0.4 %
KETONES, URINE: NEGATIVE
LDL CHOLESTEROL CALCULATED: 78 MG/DL
LEUKOCYTE ESTERASE, URINE: NEGATIVE
LYMPHOCYTES # BLD: 18.8 %
LYMPHOCYTES ABSOLUTE: 1.4 THOU/MM3 (ref 1–4.8)
MAGNESIUM: 2.2 MG/DL (ref 1.6–2.4)
MCH RBC QN AUTO: 30.1 PG (ref 26–33)
MCHC RBC AUTO-ENTMCNC: 34.4 GM/DL (ref 32.2–35.5)
MCV RBC AUTO: 87.5 FL (ref 80–94)
MISCELLANEOUS 2: ABNORMAL
MONOCYTES # BLD: 7.4 %
MONOCYTES ABSOLUTE: 0.5 THOU/MM3 (ref 0.4–1.3)
NITRITE, URINE: NEGATIVE
NUCLEATED RED BLOOD CELLS: 0 /100 WBC
PH UA: 5
PHOSPHORUS: 3.5 MG/DL (ref 2.4–4.7)
PLATELET # BLD: 221 THOU/MM3 (ref 130–400)
PMV BLD AUTO: 11.5 FL (ref 9.4–12.4)
POTASSIUM SERPL-SCNC: 4.8 MEQ/L (ref 3.5–5.2)
PROT/CREAT RATIO, UR: 1.05
PROTEIN UA: 100
PROTEIN, URINE: 108.9 MG/DL
RBC # BLD: 4.25 MILL/MM3 (ref 4.7–6.1)
RBC URINE: ABNORMAL /HPF
RENAL EPITHELIAL, UA: ABNORMAL
SEG NEUTROPHILS: 70.6 %
SEGMENTED NEUTROPHILS ABSOLUTE COUNT: 5.2 THOU/MM3 (ref 1.8–7.7)
SODIUM BLD-SCNC: 141 MEQ/L (ref 135–145)
SPECIFIC GRAVITY, URINE: 1.02 (ref 1–1.03)
TOTAL PROTEIN: 6.6 G/DL (ref 6.1–8)
TRIGL SERPL-MCNC: 333 MG/DL (ref 0–199)
UROBILINOGEN, URINE: 0.2 EU/DL
WBC # BLD: 7.3 THOU/MM3 (ref 4.8–10.8)
WBC UA: ABNORMAL /HPF
YEAST: ABNORMAL

## 2018-07-26 PROCEDURE — 84075 ASSAY ALKALINE PHOSPHATASE: CPT

## 2018-07-26 PROCEDURE — 84155 ASSAY OF PROTEIN SERUM: CPT

## 2018-07-26 PROCEDURE — 84460 ALANINE AMINO (ALT) (SGPT): CPT

## 2018-07-26 PROCEDURE — 82247 BILIRUBIN TOTAL: CPT

## 2018-07-26 PROCEDURE — 80069 RENAL FUNCTION PANEL: CPT

## 2018-07-26 PROCEDURE — 84450 TRANSFERASE (AST) (SGOT): CPT

## 2018-07-26 PROCEDURE — 36415 COLL VENOUS BLD VENIPUNCTURE: CPT

## 2018-07-26 PROCEDURE — 83036 HEMOGLOBIN GLYCOSYLATED A1C: CPT

## 2018-07-26 PROCEDURE — 80197 ASSAY OF TACROLIMUS: CPT

## 2018-07-26 PROCEDURE — 82248 BILIRUBIN DIRECT: CPT

## 2018-07-26 PROCEDURE — 80061 LIPID PANEL: CPT

## 2018-07-26 PROCEDURE — 81001 URINALYSIS AUTO W/SCOPE: CPT

## 2018-07-26 PROCEDURE — 83735 ASSAY OF MAGNESIUM: CPT

## 2018-07-26 PROCEDURE — 84156 ASSAY OF PROTEIN URINE: CPT

## 2018-07-26 PROCEDURE — 82570 ASSAY OF URINE CREATININE: CPT

## 2018-07-26 PROCEDURE — 85025 COMPLETE CBC W/AUTO DIFF WBC: CPT

## 2018-07-28 LAB — TACROLIMUS BLOOD: 2.6 NG/ML

## 2018-08-09 ENCOUNTER — HOSPITAL ENCOUNTER (OUTPATIENT)
Age: 33
Discharge: HOME | End: 2018-08-09
Payer: COMMERCIAL

## 2018-08-09 LAB
ALBUMIN SERPL-MCNC: 4.3 G/DL (ref 3.5–5.1)
ANION GAP SERPL CALCULATED.3IONS-SCNC: 14 MEQ/L (ref 8–16)
BUN BLDV-MCNC: 34 MG/DL (ref 7–22)
CALCIUM SERPL-MCNC: 9.4 MG/DL (ref 8.5–10.5)
CHLORIDE BLD-SCNC: 108 MEQ/L (ref 98–111)
CO2: 20 MEQ/L (ref 23–33)
CREAT SERPL-MCNC: 2.6 MG/DL (ref 0.4–1.2)
CREATININE URINE: 138.7 MG/DL
GFR SERPL CREATININE-BSD FRML MDRD: 28 ML/MIN/1.73M2
GLUCOSE BLD-MCNC: 96 MG/DL (ref 70–108)
PHOSPHORUS: 3.8 MG/DL (ref 2.4–4.7)
POTASSIUM SERPL-SCNC: 4.6 MEQ/L (ref 3.5–5.2)
PROT/CREAT RATIO, UR: 2.57
PROTEIN, URINE: 356.7 MG/DL
SODIUM BLD-SCNC: 142 MEQ/L (ref 135–145)

## 2018-08-09 PROCEDURE — 84156 ASSAY OF PROTEIN URINE: CPT

## 2018-08-09 PROCEDURE — 82570 ASSAY OF URINE CREATININE: CPT

## 2018-08-09 PROCEDURE — 36415 COLL VENOUS BLD VENIPUNCTURE: CPT

## 2018-08-09 PROCEDURE — 80069 RENAL FUNCTION PANEL: CPT

## 2018-08-09 PROCEDURE — 80197 ASSAY OF TACROLIMUS: CPT

## 2018-08-11 LAB — TACROLIMUS BLOOD: 3.9 NG/ML

## 2018-09-06 ENCOUNTER — HOSPITAL ENCOUNTER (OUTPATIENT)
Age: 33
Discharge: HOME | End: 2018-09-06
Payer: COMMERCIAL

## 2018-09-06 LAB
ALBUMIN SERPL-MCNC: 3.7 G/DL (ref 3.5–5.1)
ALP BLD-CCNC: 52 U/L (ref 38–126)
ALT SERPL-CCNC: 14 U/L (ref 11–66)
AMORPHOUS: ABNORMAL
ANION GAP SERPL CALCULATED.3IONS-SCNC: 13 MEQ/L (ref 8–16)
AST SERPL-CCNC: 18 U/L (ref 5–40)
AVERAGE GLUCOSE: 81 MG/DL (ref 70–126)
BACTERIA: ABNORMAL /HPF
BASOPHILS # BLD: 0.6 %
BASOPHILS ABSOLUTE: 0 THOU/MM3 (ref 0–0.1)
BILIRUB SERPL-MCNC: 0.5 MG/DL (ref 0.3–1.2)
BILIRUBIN DIRECT: < 0.2 MG/DL (ref 0–0.3)
BILIRUBIN URINE: NEGATIVE
BLOOD, URINE: ABNORMAL
BUN BLDV-MCNC: 37 MG/DL (ref 7–22)
CALCIUM SERPL-MCNC: 8.9 MG/DL (ref 8.5–10.5)
CASTS 2: ABNORMAL /LPF
CASTS UA: ABNORMAL /LPF
CHARACTER, URINE: CLEAR
CHLORIDE BLD-SCNC: 107 MEQ/L (ref 98–111)
CHOLESTEROL, TOTAL: 193 MG/DL (ref 100–199)
CO2: 23 MEQ/L (ref 23–33)
COLOR: YELLOW
CREAT SERPL-MCNC: 3.6 MG/DL (ref 0.4–1.2)
CREATININE URINE: 147.3 MG/DL
CRYSTALS, UA: ABNORMAL
EOSINOPHIL # BLD: 1.1 %
EOSINOPHILS ABSOLUTE: 0.1 THOU/MM3 (ref 0–0.4)
EPITHELIAL CELLS, UA: ABNORMAL /HPF
ERYTHROCYTE [DISTWIDTH] IN BLOOD BY AUTOMATED COUNT: 13.1 % (ref 11.5–14.5)
ERYTHROCYTE [DISTWIDTH] IN BLOOD BY AUTOMATED COUNT: 41.1 FL (ref 35–45)
GFR SERPL CREATININE-BSD FRML MDRD: 19 ML/MIN/1.73M2
GLUCOSE BLD-MCNC: 89 MG/DL (ref 70–108)
GLUCOSE URINE: NEGATIVE MG/DL
HBA1C MFR BLD: 4.7 % (ref 4.4–6.4)
HCT VFR BLD CALC: 36.9 % (ref 42–52)
HDLC SERPL-MCNC: 35 MG/DL
HEMOGLOBIN: 12.6 GM/DL (ref 14–18)
IMMATURE GRANS (ABS): 0.05 THOU/MM3 (ref 0–0.07)
IMMATURE GRANULOCYTES: 0.7 %
KETONES, URINE: NEGATIVE
LDL CHOLESTEROL CALCULATED: 106 MG/DL
LEUKOCYTE ESTERASE, URINE: NEGATIVE
LYMPHOCYTES # BLD: 15.3 %
LYMPHOCYTES ABSOLUTE: 1.1 THOU/MM3 (ref 1–4.8)
MAGNESIUM: 1.8 MG/DL (ref 1.6–2.4)
MCH RBC QN AUTO: 29.8 PG (ref 26–33)
MCHC RBC AUTO-ENTMCNC: 34.1 GM/DL (ref 32.2–35.5)
MCV RBC AUTO: 87.2 FL (ref 80–94)
MISCELLANEOUS 2: ABNORMAL
MONOCYTES # BLD: 7.7 %
MONOCYTES ABSOLUTE: 0.5 THOU/MM3 (ref 0.4–1.3)
NITRITE, URINE: NEGATIVE
NUCLEATED RED BLOOD CELLS: 0 /100 WBC
PH UA: 5.5
PHOSPHORUS: 3.9 MG/DL (ref 2.4–4.7)
PLATELET # BLD: 184 THOU/MM3 (ref 130–400)
PMV BLD AUTO: 10.9 FL (ref 9.4–12.4)
POTASSIUM SERPL-SCNC: 4.3 MEQ/L (ref 3.5–5.2)
PROT/CREAT RATIO, UR: 5.61
PROTEIN UA: >= 1000
PROTEIN, URINE: 826.5 MG/DL
RBC # BLD: 4.23 MILL/MM3 (ref 4.7–6.1)
RBC URINE: ABNORMAL /HPF
RENAL EPITHELIAL, UA: ABNORMAL
SEG NEUTROPHILS: 74.6 %
SEGMENTED NEUTROPHILS ABSOLUTE COUNT: 5.3 THOU/MM3 (ref 1.8–7.7)
SODIUM BLD-SCNC: 143 MEQ/L (ref 135–145)
SPECIFIC GRAVITY, URINE: 1.02 (ref 1–1.03)
TOTAL PROTEIN: 5.7 G/DL (ref 6.1–8)
TRIGL SERPL-MCNC: 258 MG/DL (ref 0–199)
UROBILINOGEN, URINE: 0.2 EU/DL
WBC # BLD: 7.1 THOU/MM3 (ref 4.8–10.8)
WBC UA: ABNORMAL /HPF
YEAST: ABNORMAL

## 2018-09-06 PROCEDURE — 80069 RENAL FUNCTION PANEL: CPT

## 2018-09-06 PROCEDURE — 82248 BILIRUBIN DIRECT: CPT

## 2018-09-06 PROCEDURE — 36415 COLL VENOUS BLD VENIPUNCTURE: CPT

## 2018-09-06 PROCEDURE — 84075 ASSAY ALKALINE PHOSPHATASE: CPT

## 2018-09-06 PROCEDURE — 82570 ASSAY OF URINE CREATININE: CPT

## 2018-09-06 PROCEDURE — 85025 COMPLETE CBC W/AUTO DIFF WBC: CPT

## 2018-09-06 PROCEDURE — 84156 ASSAY OF PROTEIN URINE: CPT

## 2018-09-06 PROCEDURE — 84155 ASSAY OF PROTEIN SERUM: CPT

## 2018-09-06 PROCEDURE — 80197 ASSAY OF TACROLIMUS: CPT

## 2018-09-06 PROCEDURE — 80061 LIPID PANEL: CPT

## 2018-09-06 PROCEDURE — 84460 ALANINE AMINO (ALT) (SGPT): CPT

## 2018-09-06 PROCEDURE — 84450 TRANSFERASE (AST) (SGOT): CPT

## 2018-09-06 PROCEDURE — 83735 ASSAY OF MAGNESIUM: CPT

## 2018-09-06 PROCEDURE — 81001 URINALYSIS AUTO W/SCOPE: CPT

## 2018-09-06 PROCEDURE — 82247 BILIRUBIN TOTAL: CPT

## 2018-09-06 PROCEDURE — 83036 HEMOGLOBIN GLYCOSYLATED A1C: CPT

## 2018-09-08 LAB — TACROLIMUS BLOOD: 7.1 NG/ML

## 2018-10-29 ENCOUNTER — HOSPITAL ENCOUNTER (OUTPATIENT)
Age: 33
Discharge: HOME | End: 2018-10-29
Payer: COMMERCIAL

## 2018-10-29 LAB
ALBUMIN SERPL-MCNC: 3.3 G/DL (ref 3.5–5.1)
ALP BLD-CCNC: 44 U/L (ref 38–126)
ALT SERPL-CCNC: 9 U/L (ref 11–66)
ANION GAP SERPL CALCULATED.3IONS-SCNC: 16 MEQ/L (ref 8–16)
AST SERPL-CCNC: 13 U/L (ref 5–40)
BACTERIA: ABNORMAL /HPF
BASOPHILS # BLD: 0.2 %
BASOPHILS ABSOLUTE: 0 THOU/MM3 (ref 0–0.1)
BILIRUB SERPL-MCNC: 0.5 MG/DL (ref 0.3–1.2)
BILIRUBIN DIRECT: < 0.2 MG/DL (ref 0–0.3)
BILIRUBIN URINE: NEGATIVE
BLOOD, URINE: ABNORMAL
BUN BLDV-MCNC: 66 MG/DL (ref 7–22)
CALCIUM SERPL-MCNC: 8.6 MG/DL (ref 8.5–10.5)
CASTS 2: ABNORMAL /LPF
CASTS UA: ABNORMAL /LPF
CHARACTER, URINE: ABNORMAL
CHLORIDE BLD-SCNC: 107 MEQ/L (ref 98–111)
CHOLESTEROL, TOTAL: 167 MG/DL (ref 100–199)
CO2: 17 MEQ/L (ref 23–33)
COLOR: YELLOW
CREAT SERPL-MCNC: 6.5 MG/DL (ref 0.4–1.2)
CREATININE URINE: 166.5 MG/DL
CRYSTALS, UA: ABNORMAL
EOSINOPHIL # BLD: 1 %
EOSINOPHILS ABSOLUTE: 0.1 THOU/MM3 (ref 0–0.4)
EPITHELIAL CELLS, UA: ABNORMAL /HPF
ERYTHROCYTE [DISTWIDTH] IN BLOOD BY AUTOMATED COUNT: 13.6 % (ref 11.5–14.5)
ERYTHROCYTE [DISTWIDTH] IN BLOOD BY AUTOMATED COUNT: 44.8 FL (ref 35–45)
GFR SERPL CREATININE-BSD FRML MDRD: 10 ML/MIN/1.73M2
GLUCOSE BLD-MCNC: 88 MG/DL (ref 70–108)
GLUCOSE URINE: NEGATIVE MG/DL
HCT VFR BLD CALC: 25.9 % (ref 42–52)
HDLC SERPL-MCNC: 29 MG/DL
HEMOGLOBIN: 8.2 GM/DL (ref 14–18)
IMMATURE GRANS (ABS): 0.01 THOU/MM3 (ref 0–0.07)
IMMATURE GRANULOCYTES: 0.2 %
KETONES, URINE: NEGATIVE
LDL CHOLESTEROL CALCULATED: 100 MG/DL
LEUKOCYTE ESTERASE, URINE: NEGATIVE
LYMPHOCYTES # BLD: 8.6 %
LYMPHOCYTES ABSOLUTE: 0.5 THOU/MM3 (ref 1–4.8)
MAGNESIUM: 2.1 MG/DL (ref 1.6–2.4)
MCH RBC QN AUTO: 28.5 PG (ref 26–33)
MCHC RBC AUTO-ENTMCNC: 31.7 GM/DL (ref 32.2–35.5)
MCV RBC AUTO: 89.9 FL (ref 80–94)
MISCELLANEOUS 2: ABNORMAL
MONOCYTES # BLD: 10.8 %
MONOCYTES ABSOLUTE: 0.7 THOU/MM3 (ref 0.4–1.3)
NITRITE, URINE: NEGATIVE
NUCLEATED RED BLOOD CELLS: 0 /100 WBC
PH UA: 5.5
PHOSPHORUS: 5.9 MG/DL (ref 2.4–4.7)
PLATELET # BLD: 114 THOU/MM3 (ref 130–400)
PMV BLD AUTO: 11.3 FL (ref 9.4–12.4)
POTASSIUM SERPL-SCNC: 4.7 MEQ/L (ref 3.5–5.2)
PROT/CREAT RATIO, UR: 6.35
PROTEIN UA: >= 1000
PROTEIN, URINE: 1058 MG/DL
RBC # BLD: 2.88 MILL/MM3 (ref 4.7–6.1)
RBC URINE: ABNORMAL /HPF
RENAL EPITHELIAL, UA: ABNORMAL
SEG NEUTROPHILS: 79.2 %
SEGMENTED NEUTROPHILS ABSOLUTE COUNT: 5 THOU/MM3 (ref 1.8–7.7)
SODIUM BLD-SCNC: 140 MEQ/L (ref 135–145)
SPECIFIC GRAVITY, URINE: 1.02 (ref 1–1.03)
TOTAL PROTEIN: 5 G/DL (ref 6.1–8)
TRIGL SERPL-MCNC: 189 MG/DL (ref 0–199)
UROBILINOGEN, URINE: 0.2 EU/DL
WBC # BLD: 6.3 THOU/MM3 (ref 4.8–10.8)
WBC UA: ABNORMAL /HPF
YEAST: ABNORMAL

## 2018-10-29 PROCEDURE — 87086 URINE CULTURE/COLONY COUNT: CPT

## 2018-10-29 PROCEDURE — 82247 BILIRUBIN TOTAL: CPT

## 2018-10-29 PROCEDURE — 83036 HEMOGLOBIN GLYCOSYLATED A1C: CPT

## 2018-10-29 PROCEDURE — 84156 ASSAY OF PROTEIN URINE: CPT

## 2018-10-29 PROCEDURE — 81001 URINALYSIS AUTO W/SCOPE: CPT

## 2018-10-29 PROCEDURE — 80061 LIPID PANEL: CPT

## 2018-10-29 PROCEDURE — 82248 BILIRUBIN DIRECT: CPT

## 2018-10-29 PROCEDURE — 84460 ALANINE AMINO (ALT) (SGPT): CPT

## 2018-10-29 PROCEDURE — 84450 TRANSFERASE (AST) (SGOT): CPT

## 2018-10-29 PROCEDURE — 36415 COLL VENOUS BLD VENIPUNCTURE: CPT

## 2018-10-29 PROCEDURE — 84155 ASSAY OF PROTEIN SERUM: CPT

## 2018-10-29 PROCEDURE — 85025 COMPLETE CBC W/AUTO DIFF WBC: CPT

## 2018-10-29 PROCEDURE — 80197 ASSAY OF TACROLIMUS: CPT

## 2018-10-29 PROCEDURE — 83735 ASSAY OF MAGNESIUM: CPT

## 2018-10-29 PROCEDURE — 80069 RENAL FUNCTION PANEL: CPT

## 2018-10-29 PROCEDURE — 82570 ASSAY OF URINE CREATININE: CPT

## 2018-10-29 PROCEDURE — 84075 ASSAY ALKALINE PHOSPHATASE: CPT

## 2018-10-30 LAB — TACROLIMUS BLOOD: 2.7 NG/ML

## 2018-10-31 LAB
MISC. #1 REFERENCE GROUP TEST: NORMAL
URINE CULTURE REFLEX: NORMAL

## 2018-11-01 ENCOUNTER — OFFICE VISIT (OUTPATIENT)
Dept: NEPHROLOGY | Age: 33
End: 2018-11-01
Payer: COMMERCIAL

## 2018-11-01 ENCOUNTER — TELEPHONE (OUTPATIENT)
Dept: NEPHROLOGY | Age: 33
End: 2018-11-01

## 2018-11-01 VITALS
OXYGEN SATURATION: 94 % | SYSTOLIC BLOOD PRESSURE: 141 MMHG | BODY MASS INDEX: 34.45 KG/M2 | DIASTOLIC BLOOD PRESSURE: 90 MMHG | HEART RATE: 109 BPM | WEIGHT: 226.6 LBS

## 2018-11-01 DIAGNOSIS — N05.1 FSGS (FOCAL SEGMENTAL GLOMERULOSCLEROSIS): ICD-10-CM

## 2018-11-01 DIAGNOSIS — Z94.0 HISTORY OF RENAL TRANSPLANT: ICD-10-CM

## 2018-11-01 DIAGNOSIS — N04.9 NEPHROTIC SYNDROME: ICD-10-CM

## 2018-11-01 DIAGNOSIS — N18.5 CKD (CHRONIC KIDNEY DISEASE) STAGE 5, GFR LESS THAN 15 ML/MIN (HCC): Primary | ICD-10-CM

## 2018-11-01 DIAGNOSIS — T86.11 CHRONIC RENAL ALLOGRAFT NEPHROPATHY: ICD-10-CM

## 2018-11-01 DIAGNOSIS — I25.5 ISCHEMIC CARDIOMYOPATHY: ICD-10-CM

## 2018-11-01 DIAGNOSIS — I10 ESSENTIAL HYPERTENSION: ICD-10-CM

## 2018-11-01 PROCEDURE — G8484 FLU IMMUNIZE NO ADMIN: HCPCS | Performed by: INTERNAL MEDICINE

## 2018-11-01 PROCEDURE — G8599 NO ASA/ANTIPLAT THER USE RNG: HCPCS | Performed by: INTERNAL MEDICINE

## 2018-11-01 PROCEDURE — 99203 OFFICE O/P NEW LOW 30 MIN: CPT | Performed by: INTERNAL MEDICINE

## 2018-11-01 PROCEDURE — 4004F PT TOBACCO SCREEN RCVD TLK: CPT | Performed by: INTERNAL MEDICINE

## 2018-11-01 PROCEDURE — G8427 DOCREV CUR MEDS BY ELIG CLIN: HCPCS | Performed by: INTERNAL MEDICINE

## 2018-11-01 PROCEDURE — G8417 CALC BMI ABV UP PARAM F/U: HCPCS | Performed by: INTERNAL MEDICINE

## 2018-11-01 RX ORDER — FUROSEMIDE 80 MG
80 TABLET ORAL DAILY
Qty: 60 TABLET | Refills: 3 | Status: ON HOLD | OUTPATIENT
Start: 2018-11-01 | End: 2018-11-28 | Stop reason: HOSPADM

## 2018-11-01 RX ORDER — FUROSEMIDE 40 MG/1
40 TABLET ORAL DAILY
COMMUNITY
Start: 2018-10-03 | End: 2018-11-08

## 2018-11-01 RX ORDER — DAPSONE 100 MG/1
100 TABLET ORAL DAILY
COMMUNITY
Start: 2018-09-14 | End: 2018-01-01 | Stop reason: DRUGHIGH

## 2018-11-01 RX ORDER — NIFEDIPINE 90 MG/1
90 TABLET, EXTENDED RELEASE ORAL DAILY
COMMUNITY
Start: 2018-10-05

## 2018-11-01 RX ORDER — HYDRALAZINE HYDROCHLORIDE 25 MG/1
25 TABLET, FILM COATED ORAL 3 TIMES DAILY
COMMUNITY
Start: 2018-09-13 | End: 2018-01-01 | Stop reason: DRUGHIGH

## 2018-11-01 RX ORDER — CARVEDILOL 25 MG/1
25 TABLET ORAL 2 TIMES DAILY
COMMUNITY
Start: 2018-09-13

## 2018-11-01 RX ORDER — SODIUM BICARBONATE 650 MG/1
1300 TABLET ORAL 3 TIMES DAILY
Status: ON HOLD | COMMUNITY
Start: 2018-10-16 | End: 2018-11-28

## 2018-11-02 ENCOUNTER — HOSPITAL ENCOUNTER (OUTPATIENT)
Age: 33
Discharge: HOME | End: 2018-11-02
Payer: COMMERCIAL

## 2018-11-02 DIAGNOSIS — N18.5 CHRONIC KIDNEY DISEASE, STAGE V (HCC): Primary | ICD-10-CM

## 2018-11-02 LAB
HBV SURFACE AB TITR SER: NEGATIVE {TITER}
HEPATITIS B SURFACE ANTIGEN: NEGATIVE
INR BLD: 0.94 (ref 0.85–1.13)
PLATELET # BLD: 182 THOU/MM3 (ref 130–400)

## 2018-11-02 PROCEDURE — 86704 HEP B CORE ANTIBODY TOTAL: CPT

## 2018-11-02 PROCEDURE — 83036 HEMOGLOBIN GLYCOSYLATED A1C: CPT

## 2018-11-02 PROCEDURE — 87340 HEPATITIS B SURFACE AG IA: CPT

## 2018-11-02 PROCEDURE — 85610 PROTHROMBIN TIME: CPT

## 2018-11-02 PROCEDURE — 36415 COLL VENOUS BLD VENIPUNCTURE: CPT

## 2018-11-02 PROCEDURE — 86706 HEP B SURFACE ANTIBODY: CPT

## 2018-11-02 PROCEDURE — 85049 AUTOMATED PLATELET COUNT: CPT

## 2018-11-04 LAB — HEPATITIS B CORE TOTAL ANTIBODY: NEGATIVE

## 2018-11-06 ENCOUNTER — HOSPITAL ENCOUNTER (OUTPATIENT)
Dept: INTERVENTIONAL RADIOLOGY/VASCULAR | Age: 33
Discharge: HOME | End: 2018-11-06
Payer: COMMERCIAL

## 2018-11-06 VITALS
OXYGEN SATURATION: 95 % | RESPIRATION RATE: 16 BRPM | TEMPERATURE: 99 F | HEART RATE: 86 BPM | BODY MASS INDEX: 34.21 KG/M2 | SYSTOLIC BLOOD PRESSURE: 201 MMHG | WEIGHT: 225 LBS | DIASTOLIC BLOOD PRESSURE: 133 MMHG

## 2018-11-06 DIAGNOSIS — N18.5 CKD (CHRONIC KIDNEY DISEASE) STAGE 5, GFR LESS THAN 15 ML/MIN (HCC): ICD-10-CM

## 2018-11-06 LAB
ERYTHROCYTE [DISTWIDTH] IN BLOOD BY AUTOMATED COUNT: 13.8 % (ref 11.5–14.5)
ERYTHROCYTE [DISTWIDTH] IN BLOOD BY AUTOMATED COUNT: 45.1 FL (ref 35–45)
HCT VFR BLD CALC: 25.5 % (ref 42–52)
HEMOGLOBIN: 7.9 GM/DL (ref 14–18)
MCH RBC QN AUTO: 28.1 PG (ref 26–33)
MCHC RBC AUTO-ENTMCNC: 31 GM/DL (ref 32.2–35.5)
MCV RBC AUTO: 90.7 FL (ref 80–94)
PLATELET # BLD: 141 THOU/MM3 (ref 130–400)
PMV BLD AUTO: 11.1 FL (ref 9.4–12.4)
RBC # BLD: 2.81 MILL/MM3 (ref 4.7–6.1)
WBC # BLD: 7 THOU/MM3 (ref 4.8–10.8)

## 2018-11-06 PROCEDURE — 2709999900 HC NON-CHARGEABLE SUPPLY

## 2018-11-06 PROCEDURE — 85027 COMPLETE CBC AUTOMATED: CPT

## 2018-11-06 PROCEDURE — 49418 INSERT TUN IP CATH PERC: CPT | Performed by: RADIOLOGY

## 2018-11-06 PROCEDURE — 2500000003 HC RX 250 WO HCPCS

## 2018-11-06 PROCEDURE — 2500000003 HC RX 250 WO HCPCS: Performed by: RADIOLOGY

## 2018-11-06 PROCEDURE — 6360000002 HC RX W HCPCS: Performed by: RADIOLOGY

## 2018-11-06 PROCEDURE — 2580000003 HC RX 258: Performed by: RADIOLOGY

## 2018-11-06 PROCEDURE — 6360000002 HC RX W HCPCS

## 2018-11-06 PROCEDURE — C1894 INTRO/SHEATH, NON-LASER: HCPCS

## 2018-11-06 PROCEDURE — 6360000004 HC RX CONTRAST MEDICATION: Performed by: RADIOLOGY

## 2018-11-06 PROCEDURE — C1750 CATH, HEMODIALYSIS,LONG-TERM: HCPCS

## 2018-11-06 PROCEDURE — 36415 COLL VENOUS BLD VENIPUNCTURE: CPT

## 2018-11-06 PROCEDURE — C1769 GUIDE WIRE: HCPCS

## 2018-11-06 RX ORDER — MIDAZOLAM HYDROCHLORIDE 1 MG/ML
0.5 INJECTION INTRAMUSCULAR; INTRAVENOUS ONCE
Status: COMPLETED | OUTPATIENT
Start: 2018-11-06 | End: 2018-11-06

## 2018-11-06 RX ORDER — BUPIVACAINE HYDROCHLORIDE 2.5 MG/ML
10 INJECTION, SOLUTION EPIDURAL; INFILTRATION; INTRACAUDAL ONCE
Status: COMPLETED | OUTPATIENT
Start: 2018-11-06 | End: 2018-11-06

## 2018-11-06 RX ORDER — SODIUM CHLORIDE 450 MG/100ML
INJECTION, SOLUTION INTRAVENOUS CONTINUOUS
Status: DISCONTINUED | OUTPATIENT
Start: 2018-11-06 | End: 2018-11-07 | Stop reason: HOSPADM

## 2018-11-06 RX ORDER — MIDAZOLAM HYDROCHLORIDE 1 MG/ML
1 INJECTION INTRAMUSCULAR; INTRAVENOUS ONCE
Status: COMPLETED | OUTPATIENT
Start: 2018-11-06 | End: 2018-11-06

## 2018-11-06 RX ORDER — FENTANYL CITRATE 50 UG/ML
25 INJECTION, SOLUTION INTRAMUSCULAR; INTRAVENOUS ONCE
Status: DISCONTINUED | OUTPATIENT
Start: 2018-11-06 | End: 2018-11-06

## 2018-11-06 RX ADMIN — MIDAZOLAM HYDROCHLORIDE 0.5 MG: 1 INJECTION INTRAMUSCULAR; INTRAVENOUS at 08:29

## 2018-11-06 RX ADMIN — MIDAZOLAM HYDROCHLORIDE 1 MG: 1 INJECTION INTRAMUSCULAR; INTRAVENOUS at 08:24

## 2018-11-06 RX ADMIN — IOHEXOL 45 ML: 240 INJECTION, SOLUTION INTRATHECAL; INTRAVASCULAR; INTRAVENOUS; ORAL at 09:16

## 2018-11-06 RX ADMIN — BUPIVACAINE HYDROCHLORIDE 8 ML: 2.5 INJECTION, SOLUTION EPIDURAL; INFILTRATION; INTRACAUDAL at 08:53

## 2018-11-06 RX ADMIN — SODIUM CHLORIDE: 4.5 INJECTION, SOLUTION INTRAVENOUS at 07:15

## 2018-11-06 RX ADMIN — CEFAZOLIN 1 G: 1 INJECTION, POWDER, FOR SOLUTION INTRAMUSCULAR; INTRAVENOUS; PARENTERAL at 07:30

## 2018-11-06 RX ADMIN — SODIUM CHLORIDE 50000 UNITS: 900 IRRIGANT IRRIGATION at 09:16

## 2018-11-06 RX ADMIN — Medication 0.5 MG: at 08:30

## 2018-11-06 RX ADMIN — Medication 1 MG: at 08:25

## 2018-11-06 ASSESSMENT — PAIN SCALES - GENERAL
PAINLEVEL_OUTOF10: 0

## 2018-11-06 ASSESSMENT — PAIN - FUNCTIONAL ASSESSMENT: PAIN_FUNCTIONAL_ASSESSMENT: 0-10

## 2018-11-06 NOTE — PROGRESS NOTES
2632 Pt in specials radiology for CAPD catheter insertion. Explained procedure to pt and pt verbalizes understanding. Consent signed. Dr Shaunna Quiroz made aware of pt's BP.  0815 Pt moved to table and attached to monitor. 3813 Dr Shaunna Quiroz to assess pt.  0837 SPO2 88%. O2 2 liters per nasal cannula applied. 9977 Dr Shaunna Quiroz aware or pt's BP.  0910 CAPD catheter inserted in right mid abdomen per Dr Shaunna Quiroz. 5130 Incision approximated with sutures. 8259 Procedure complete. Pt tolerated well. Dermaflex applied to incision with steri-strips. [facility] placed at catheter exit site with 4 x 4 and op-site dressings. Sites without redness, swelling or drainage. 0963 Pt positioned on cart for comfort. Transferred to OPN per cart and report called to OPN.

## 2018-11-07 LAB — MISC. #1 REFERENCE GROUP TEST: NORMAL

## 2018-11-08 ENCOUNTER — OFFICE VISIT (OUTPATIENT)
Dept: FAMILY MEDICINE CLINIC | Age: 33
End: 2018-11-08
Payer: COMMERCIAL

## 2018-11-08 VITALS
OXYGEN SATURATION: 99 % | HEIGHT: 69 IN | WEIGHT: 226 LBS | HEART RATE: 96 BPM | RESPIRATION RATE: 20 BRPM | BODY MASS INDEX: 33.47 KG/M2 | SYSTOLIC BLOOD PRESSURE: 138 MMHG | DIASTOLIC BLOOD PRESSURE: 84 MMHG | TEMPERATURE: 98.6 F

## 2018-11-08 DIAGNOSIS — H00.015 HORDEOLUM EXTERNUM OF LEFT LOWER EYELID: Primary | ICD-10-CM

## 2018-11-08 DIAGNOSIS — Z23 NEEDS FLU SHOT: ICD-10-CM

## 2018-11-08 DIAGNOSIS — N18.5 CKD (CHRONIC KIDNEY DISEASE) STAGE 5, GFR LESS THAN 15 ML/MIN (HCC): ICD-10-CM

## 2018-11-08 PROCEDURE — 99213 OFFICE O/P EST LOW 20 MIN: CPT | Performed by: FAMILY MEDICINE

## 2018-11-08 PROCEDURE — G8482 FLU IMMUNIZE ORDER/ADMIN: HCPCS | Performed by: FAMILY MEDICINE

## 2018-11-08 PROCEDURE — G8599 NO ASA/ANTIPLAT THER USE RNG: HCPCS | Performed by: FAMILY MEDICINE

## 2018-11-08 PROCEDURE — G8417 CALC BMI ABV UP PARAM F/U: HCPCS | Performed by: FAMILY MEDICINE

## 2018-11-08 PROCEDURE — 4004F PT TOBACCO SCREEN RCVD TLK: CPT | Performed by: FAMILY MEDICINE

## 2018-11-08 PROCEDURE — 90471 IMMUNIZATION ADMIN: CPT | Performed by: FAMILY MEDICINE

## 2018-11-08 PROCEDURE — 90688 IIV4 VACCINE SPLT 0.5 ML IM: CPT | Performed by: FAMILY MEDICINE

## 2018-11-08 PROCEDURE — 90472 IMMUNIZATION ADMIN EACH ADD: CPT | Performed by: FAMILY MEDICINE

## 2018-11-08 PROCEDURE — G8427 DOCREV CUR MEDS BY ELIG CLIN: HCPCS | Performed by: FAMILY MEDICINE

## 2018-11-08 PROCEDURE — 90670 PCV13 VACCINE IM: CPT | Performed by: FAMILY MEDICINE

## 2018-11-08 RX ORDER — GENTAMICIN SULFATE 3 MG/ML
1 SOLUTION/ DROPS OPHTHALMIC 4 TIMES DAILY
Qty: 1 BOTTLE | Refills: 0 | Status: ON HOLD | OUTPATIENT
Start: 2018-11-08 | End: 2018-11-28 | Stop reason: HOSPADM

## 2018-11-08 NOTE — PATIENT INSTRUCTIONS
Patient Education        Influenza (Flu) Vaccine (Inactivated or Recombinant): What You Need to Know  Why get vaccinated? Influenza (\"flu\") is a contagious disease that spreads around the [place] every winter, usually between October and May. Flu is caused by influenza viruses and is spread mainly by coughing, sneezing, and close contact. Anyone can get flu. Flu strikes suddenly and can last several days. Symptoms vary by age, but can include:  · Fever/chills. · Sore throat. · Muscle aches. · Fatigue. · Cough. · Headache. · Runny or stuffy nose. Flu can also lead to pneumonia and blood infections, and cause diarrhea and seizures in children. If you have a medical condition, such as heart or lung disease, flu can make it worse. Flu is more dangerous for some people. Infants and young children, people 72years of age and older, pregnant women, and people with certain health conditions or a weakened immune system are at greatest risk. Each year thousands of people in the [facility] die from flu, and many more are hospitalized. Flu vaccine can:  · Keep you from getting flu. · Make flu less severe if you do get it. · Keep you from spreading flu to your family and other people. Inactivated and recombinant flu vaccines  A dose of flu vaccine is recommended every flu season. Children 6 months through 6years of age may need two doses during the same flu season. Everyone else needs only one dose each flu season. Some inactivated flu vaccines contain a very small amount of a mercury-based preservative called thimerosal. Studies have not shown thimerosal in vaccines to be harmful, but flu vaccines that do not contain thimerosal are available. There is no live flu virus in flu shots. They cannot cause the flu. There are many flu viruses, and they are always changing.  Each year a new flu vaccine is made to protect against three or four viruses that are likely to cause disease in the upcoming flu (pneumonia). · Blood (bacteremia). · Covering of the brain and spinal cord (meningitis). Pneumococcal pneumonia is most common among adults. Pneumococcal meningitis can cause deafness and brain damage, and it kills about 1 child in 10 who get it. Anyone can get pneumococcal disease, but children under 3years of age and adults 72 years and older, people with certain medical conditions, and cigarette smokers are at the highest risk. Before there was a vaccine, the [facility] saw the following in children under 5 each year from pneumococcal disease:  · More than 700 cases of meningitis  · About 13,000 blood infections  · About 5 million ear infections  · About 200 deaths  Since the vaccine became available, severe pneumococcal disease in these children has fallen by 88%. About 18,000 older adults die of pneumococcal disease each year in the [place]. Treatment of pneumococcal infections with penicillin and other drugs is not as effective as it used to be, because some strains of the disease have become resistant to these drugs. This makes prevention of the disease through vaccination even more important. PCV13 vaccine  Pneumococcal conjugate vaccine (called PCV13) protects against 13 types of pneumococcal bacteria. PCV13 is routinely given to children at 2, 4, 6, and 1515 months of age. It is also recommended for children and adults 3to 59years of age with certain health conditions, and for all adults 72years of age and older. Your doctor can give you details. Some people should not get this vaccine  Anyone who has ever had a life-threatening allergic reaction to a dose of this vaccine, to an earlier pneumococcal vaccine called PCV7, or to any vaccine containing diphtheria toxoid (for example, DTaP), should not get PCV13. Anyone with a severe allergy to any component of PCV13 should not get the vaccine. Tell your doctor if the person being vaccinated has any severe allergies.   If the person

## 2018-11-11 ASSESSMENT — ENCOUNTER SYMPTOMS
RHINORRHEA: 0
SORE THROAT: 0
COUGH: 0
BACK PAIN: 0
SHORTNESS OF BREATH: 0
CONSTIPATION: 0
VOMITING: 0
WHEEZING: 0
EYE DISCHARGE: 1
NAUSEA: 0
DIARRHEA: 0
ABDOMINAL PAIN: 0

## 2018-11-15 ENCOUNTER — APPOINTMENT (OUTPATIENT)
Dept: GENERAL RADIOLOGY | Age: 33
DRG: 207 | End: 2018-11-15
Payer: COMMERCIAL

## 2018-11-15 ENCOUNTER — HOSPITAL ENCOUNTER (INPATIENT)
Age: 33
LOS: 13 days | Discharge: NURSING, REHAB OR LONG-TERM CARE | DRG: 207 | End: 2018-11-28
Attending: EMERGENCY MEDICINE | Admitting: INTERNAL MEDICINE
Payer: COMMERCIAL

## 2018-11-15 DIAGNOSIS — J81.0 PULMONARY EDEMA, ACUTE (HCC): ICD-10-CM

## 2018-11-15 DIAGNOSIS — O22.30 DVT (DEEP VEIN THROMBOSIS) IN PREGNANCY: ICD-10-CM

## 2018-11-15 DIAGNOSIS — J96.02 ACUTE RESPIRATORY FAILURE WITH HYPOXIA AND HYPERCAPNIA (HCC): Primary | ICD-10-CM

## 2018-11-15 DIAGNOSIS — J96.01 ACUTE RESPIRATORY FAILURE WITH HYPOXIA AND HYPERCAPNIA (HCC): Primary | ICD-10-CM

## 2018-11-15 PROBLEM — J96.90 RESPIRATORY FAILURE (HCC): Status: ACTIVE | Noted: 2018-11-15

## 2018-11-15 PROBLEM — R06.03 ACUTE RESPIRATORY DISTRESS: Status: ACTIVE | Noted: 2018-11-15

## 2018-11-15 LAB
ALBUMIN SERPL-MCNC: 3.9 G/DL (ref 3.5–5.1)
ALLEN TEST: POSITIVE
ALP BLD-CCNC: 54 U/L (ref 38–126)
ALT SERPL-CCNC: 6 U/L (ref 11–66)
AMYLASE: 63 U/L (ref 20–104)
ANION GAP SERPL CALCULATED.3IONS-SCNC: 18 MEQ/L (ref 8–16)
APTT: 29 SECONDS (ref 22–38)
AST SERPL-CCNC: 14 U/L (ref 5–40)
ATYPICAL LYMPHOCYTES: ABNORMAL %
BASE EXCESS (CALCULATED): -11.9 MMOL/L (ref -2.5–2.5)
BASOPHILS # BLD: 0.5 %
BASOPHILS ABSOLUTE: 0.1 THOU/MM3 (ref 0–0.1)
BILIRUB SERPL-MCNC: 0.5 MG/DL (ref 0.3–1.2)
BILIRUBIN DIRECT: < 0.2 MG/DL (ref 0–0.3)
BUN BLDV-MCNC: 66 MG/DL (ref 7–22)
CALCIUM SERPL-MCNC: 8.9 MG/DL (ref 8.5–10.5)
CHLORIDE BLD-SCNC: 109 MEQ/L (ref 98–111)
CO2: 16 MEQ/L (ref 23–33)
COLLECTED BY:: ABNORMAL
CREAT SERPL-MCNC: 8 MG/DL (ref 0.4–1.2)
DEVICE: ABNORMAL
EKG ATRIAL RATE: 140 BPM
EKG P AXIS: 60 DEGREES
EKG P-R INTERVAL: 112 MS
EKG Q-T INTERVAL: 292 MS
EKG QRS DURATION: 78 MS
EKG QTC CALCULATION (BAZETT): 445 MS
EKG R AXIS: 18 DEGREES
EKG T AXIS: 4 DEGREES
EKG VENTRICULAR RATE: 140 BPM
EOSINOPHIL # BLD: 1.1 %
EOSINOPHILS ABSOLUTE: 0.2 THOU/MM3 (ref 0–0.4)
ERYTHROCYTE [DISTWIDTH] IN BLOOD BY AUTOMATED COUNT: 14.3 % (ref 11.5–14.5)
ERYTHROCYTE [DISTWIDTH] IN BLOOD BY AUTOMATED COUNT: 48.4 FL (ref 35–45)
GFR SERPL CREATININE-BSD FRML MDRD: 8 ML/MIN/1.73M2
GLUCOSE BLD-MCNC: 192 MG/DL (ref 70–108)
GLUCOSE BLD-MCNC: 199 MG/DL (ref 70–108)
HCO3: 18 MMOL/L (ref 23–28)
HCT VFR BLD CALC: 36 % (ref 42–52)
HEMOGLOBIN: 10.7 GM/DL (ref 14–18)
IFIO2: 100
IMMATURE GRANS (ABS): 0.18 THOU/MM3 (ref 0–0.07)
IMMATURE GRANULOCYTES: 0.9 %
INR BLD: 0.94 (ref 0.85–1.13)
LACTIC ACID: 1.2 MMOL/L (ref 0.5–2.2)
LIPASE: 75.5 U/L (ref 5.6–51.3)
LYMPHOCYTES # BLD: 22.8 %
LYMPHOCYTES ABSOLUTE: 4.7 THOU/MM3 (ref 1–4.8)
MAGNESIUM: 2.3 MG/DL (ref 1.6–2.4)
MCH RBC QN AUTO: 27.9 PG (ref 26–33)
MCHC RBC AUTO-ENTMCNC: 29.7 GM/DL (ref 32.2–35.5)
MCV RBC AUTO: 94 FL (ref 80–94)
MONOCYTES # BLD: 10.2 %
MONOCYTES ABSOLUTE: 2.1 THOU/MM3 (ref 0.4–1.3)
MRSA SCREEN RT-PCR: NEGATIVE
NUCLEATED RED BLOOD CELLS: 0 /100 WBC
O2 SATURATION: 98 %
OSMOLALITY CALCULATION: 309.6 MOSMOL/KG (ref 275–300)
PCO2: 59 MMHG (ref 35–45)
PH BLOOD GAS: 7.09 (ref 7.35–7.45)
PHOSPHORUS: 8 MG/DL (ref 2.4–4.7)
PLATELET # BLD: 361 THOU/MM3 (ref 130–400)
PMV BLD AUTO: 11.7 FL (ref 9.4–12.4)
PO2: 136 MMHG (ref 71–104)
POIKILOCYTES: ABNORMAL
POTASSIUM SERPL-SCNC: 5.4 MEQ/L (ref 3.5–5.2)
PRO-BNP: ABNORMAL PG/ML (ref 0–450)
RBC # BLD: 3.83 MILL/MM3 (ref 4.7–6.1)
SCAN OF BLOOD SMEAR: NORMAL
SEG NEUTROPHILS: 64.5 %
SEGMENTED NEUTROPHILS ABSOLUTE COUNT: 13.4 THOU/MM3 (ref 1.8–7.7)
SET PEEP: 12 MMHG
SET PRESS SUPP: 10 CMH2O
SODIUM BLD-SCNC: 143 MEQ/L (ref 135–145)
SOURCE, BLOOD GAS: ABNORMAL
TOTAL PROTEIN: 5.8 G/DL (ref 6.1–8)
TROPONIN T: 0.05 NG/ML
VANCOMYCIN RESISTANT ENTEROCOCCUS: NEGATIVE
WBC # BLD: 20.7 THOU/MM3 (ref 4.8–10.8)

## 2018-11-15 PROCEDURE — 83605 ASSAY OF LACTIC ACID: CPT

## 2018-11-15 PROCEDURE — 87253 VIRUS INOCULATE TISSUE ADDL: CPT

## 2018-11-15 PROCEDURE — 36556 INSERT NON-TUNNEL CV CATH: CPT | Performed by: INTERNAL MEDICINE

## 2018-11-15 PROCEDURE — 2000000000 HC ICU R&B

## 2018-11-15 PROCEDURE — 6360000002 HC RX W HCPCS

## 2018-11-15 PROCEDURE — 0BH17EZ INSERTION OF ENDOTRACHEAL AIRWAY INTO TRACHEA, VIA NATURAL OR ARTIFICIAL OPENING: ICD-10-PCS | Performed by: EMERGENCY MEDICINE

## 2018-11-15 PROCEDURE — 51702 INSERT TEMP BLADDER CATH: CPT

## 2018-11-15 PROCEDURE — 71045 X-RAY EXAM CHEST 1 VIEW: CPT

## 2018-11-15 PROCEDURE — 96375 TX/PRO/DX INJ NEW DRUG ADDON: CPT

## 2018-11-15 PROCEDURE — 5A1D70Z PERFORMANCE OF URINARY FILTRATION, INTERMITTENT, LESS THAN 6 HOURS PER DAY: ICD-10-PCS | Performed by: INTERNAL MEDICINE

## 2018-11-15 PROCEDURE — 2709999900 HC NON-CHARGEABLE SUPPLY: Performed by: INTERNAL MEDICINE

## 2018-11-15 PROCEDURE — 6360000002 HC RX W HCPCS: Performed by: INTERNAL MEDICINE

## 2018-11-15 PROCEDURE — 80053 COMPREHEN METABOLIC PANEL: CPT

## 2018-11-15 PROCEDURE — 84484 ASSAY OF TROPONIN QUANT: CPT

## 2018-11-15 PROCEDURE — 99285 EMERGENCY DEPT VISIT HI MDM: CPT

## 2018-11-15 PROCEDURE — 88112 CYTOPATH CELL ENHANCE TECH: CPT

## 2018-11-15 PROCEDURE — G0257 UNSCHED DIALYSIS ESRD PT HOS: HCPCS

## 2018-11-15 PROCEDURE — 2709999900 HC NON-CHARGEABLE SUPPLY

## 2018-11-15 PROCEDURE — 2500000003 HC RX 250 WO HCPCS: Performed by: INTERNAL MEDICINE

## 2018-11-15 PROCEDURE — 82948 REAGENT STRIP/BLOOD GLUCOSE: CPT

## 2018-11-15 PROCEDURE — 2580000003 HC RX 258: Performed by: NURSE PRACTITIONER

## 2018-11-15 PROCEDURE — 2500000003 HC RX 250 WO HCPCS: Performed by: NURSE PRACTITIONER

## 2018-11-15 PROCEDURE — 87205 SMEAR GRAM STAIN: CPT

## 2018-11-15 PROCEDURE — 3609010800 HC BRONCHOSCOPY ALVEOLAR LAVAGE: Performed by: INTERNAL MEDICINE

## 2018-11-15 PROCEDURE — 96365 THER/PROPH/DIAG IV INF INIT: CPT

## 2018-11-15 PROCEDURE — 36415 COLL VENOUS BLD VENIPUNCTURE: CPT

## 2018-11-15 PROCEDURE — 93010 ELECTROCARDIOGRAM REPORT: CPT | Performed by: INTERNAL MEDICINE

## 2018-11-15 PROCEDURE — 36556 INSERT NON-TUNNEL CV CATH: CPT

## 2018-11-15 PROCEDURE — 2700000000 HC OXYGEN THERAPY PER DAY

## 2018-11-15 PROCEDURE — 87252 VIRUS INOCULATION TISSUE: CPT

## 2018-11-15 PROCEDURE — 83880 ASSAY OF NATRIURETIC PEPTIDE: CPT

## 2018-11-15 PROCEDURE — 99253 IP/OBS CNSLTJ NEW/EST LOW 45: CPT | Performed by: INTERNAL MEDICINE

## 2018-11-15 PROCEDURE — 87070 CULTURE OTHR SPECIMN AEROBIC: CPT

## 2018-11-15 PROCEDURE — 83735 ASSAY OF MAGNESIUM: CPT

## 2018-11-15 PROCEDURE — 99292 CRITICAL CARE ADDL 30 MIN: CPT | Performed by: INTERNAL MEDICINE

## 2018-11-15 PROCEDURE — 5A1955Z RESPIRATORY VENTILATION, GREATER THAN 96 CONSECUTIVE HOURS: ICD-10-PCS | Performed by: INTERNAL MEDICINE

## 2018-11-15 PROCEDURE — 82248 BILIRUBIN DIRECT: CPT

## 2018-11-15 PROCEDURE — 2580000003 HC RX 258: Performed by: INTERNAL MEDICINE

## 2018-11-15 PROCEDURE — 31624 DX BRONCHOSCOPE/LAVAGE: CPT | Performed by: INTERNAL MEDICINE

## 2018-11-15 PROCEDURE — 82150 ASSAY OF AMYLASE: CPT

## 2018-11-15 PROCEDURE — 05HN33Z INSERTION OF INFUSION DEVICE INTO LEFT INTERNAL JUGULAR VEIN, PERCUTANEOUS APPROACH: ICD-10-PCS | Performed by: INTERNAL MEDICINE

## 2018-11-15 PROCEDURE — 85610 PROTHROMBIN TIME: CPT

## 2018-11-15 PROCEDURE — 87641 MR-STAPH DNA AMP PROBE: CPT

## 2018-11-15 PROCEDURE — 02H633Z INSERTION OF INFUSION DEVICE INTO RIGHT ATRIUM, PERCUTANEOUS APPROACH: ICD-10-PCS | Performed by: INTERNAL MEDICINE

## 2018-11-15 PROCEDURE — C1751 CATH, INF, PER/CENT/MIDLINE: HCPCS

## 2018-11-15 PROCEDURE — 87500 VANOMYCIN DNA AMP PROBE: CPT

## 2018-11-15 PROCEDURE — 6370000000 HC RX 637 (ALT 250 FOR IP): Performed by: INTERNAL MEDICINE

## 2018-11-15 PROCEDURE — 87086 URINE CULTURE/COLONY COUNT: CPT

## 2018-11-15 PROCEDURE — 83690 ASSAY OF LIPASE: CPT

## 2018-11-15 PROCEDURE — 94660 CPAP INITIATION&MGMT: CPT

## 2018-11-15 PROCEDURE — 88108 CYTOPATH CONCENTRATE TECH: CPT

## 2018-11-15 PROCEDURE — 87102 FUNGUS ISOLATION CULTURE: CPT

## 2018-11-15 PROCEDURE — 2500000003 HC RX 250 WO HCPCS: Performed by: EMERGENCY MEDICINE

## 2018-11-15 PROCEDURE — 99152 MOD SED SAME PHYS/QHP 5/>YRS: CPT | Performed by: INTERNAL MEDICINE

## 2018-11-15 PROCEDURE — 0B9D8ZX DRAINAGE OF RIGHT MIDDLE LUNG LOBE, VIA NATURAL OR ARTIFICIAL OPENING ENDOSCOPIC, DIAGNOSTIC: ICD-10-PCS | Performed by: INTERNAL MEDICINE

## 2018-11-15 PROCEDURE — 85730 THROMBOPLASTIN TIME PARTIAL: CPT

## 2018-11-15 PROCEDURE — 93005 ELECTROCARDIOGRAM TRACING: CPT | Performed by: EMERGENCY MEDICINE

## 2018-11-15 PROCEDURE — 87081 CULTURE SCREEN ONLY: CPT

## 2018-11-15 PROCEDURE — 88312 SPECIAL STAINS GROUP 1: CPT

## 2018-11-15 PROCEDURE — 87116 MYCOBACTERIA CULTURE: CPT

## 2018-11-15 PROCEDURE — 94002 VENT MGMT INPAT INIT DAY: CPT

## 2018-11-15 PROCEDURE — 84100 ASSAY OF PHOSPHORUS: CPT

## 2018-11-15 PROCEDURE — 85025 COMPLETE CBC W/AUTO DIFF WBC: CPT

## 2018-11-15 PROCEDURE — 31500 INSERT EMERGENCY AIRWAY: CPT

## 2018-11-15 PROCEDURE — 99291 CRITICAL CARE FIRST HOUR: CPT | Performed by: INTERNAL MEDICINE

## 2018-11-15 PROCEDURE — 6360000002 HC RX W HCPCS: Performed by: EMERGENCY MEDICINE

## 2018-11-15 PROCEDURE — 89051 BODY FLUID CELL COUNT: CPT

## 2018-11-15 PROCEDURE — 82803 BLOOD GASES ANY COMBINATION: CPT

## 2018-11-15 PROCEDURE — 36600 WITHDRAWAL OF ARTERIAL BLOOD: CPT

## 2018-11-15 RX ORDER — PROPOFOL 10 MG/ML
INJECTION, EMULSION INTRAVENOUS
Status: COMPLETED
Start: 2018-11-15 | End: 2018-11-15

## 2018-11-15 RX ORDER — CARVEDILOL 25 MG/1
25 TABLET ORAL 2 TIMES DAILY WITH MEALS
Status: DISCONTINUED | OUTPATIENT
Start: 2018-11-15 | End: 2018-11-28 | Stop reason: HOSPADM

## 2018-11-15 RX ORDER — CHLORHEXIDINE GLUCONATE 0.12 MG/ML
15 RINSE ORAL 2 TIMES DAILY
Status: DISCONTINUED | OUTPATIENT
Start: 2018-11-15 | End: 2018-11-26

## 2018-11-15 RX ORDER — ETOMIDATE 2 MG/ML
30 INJECTION INTRAVENOUS ONCE
Status: DISCONTINUED | OUTPATIENT
Start: 2018-11-15 | End: 2018-11-15

## 2018-11-15 RX ORDER — LORAZEPAM 2 MG/ML
INJECTION INTRAMUSCULAR
Status: COMPLETED
Start: 2018-11-15 | End: 2018-11-15

## 2018-11-15 RX ORDER — ACYCLOVIR 200 MG/1
800 CAPSULE ORAL 2 TIMES DAILY
Status: DISCONTINUED | OUTPATIENT
Start: 2018-11-15 | End: 2018-11-19

## 2018-11-15 RX ORDER — ROCURONIUM BROMIDE 10 MG/ML
70 INJECTION, SOLUTION INTRAVENOUS ONCE
Status: COMPLETED | OUTPATIENT
Start: 2018-11-15 | End: 2018-11-15

## 2018-11-15 RX ORDER — PREDNISONE 1 MG/1
5 TABLET ORAL DAILY
Status: DISCONTINUED | OUTPATIENT
Start: 2018-11-15 | End: 2018-11-20

## 2018-11-15 RX ORDER — MORPHINE SULFATE 4 MG/ML
4 INJECTION, SOLUTION INTRAMUSCULAR; INTRAVENOUS ONCE
Status: COMPLETED | OUTPATIENT
Start: 2018-11-15 | End: 2018-11-15

## 2018-11-15 RX ORDER — HEPARIN SODIUM 5000 [USP'U]/ML
5000 INJECTION, SOLUTION INTRAVENOUS; SUBCUTANEOUS 2 TIMES DAILY
Status: DISCONTINUED | OUTPATIENT
Start: 2018-11-15 | End: 2018-11-20

## 2018-11-15 RX ORDER — FAMOTIDINE 20 MG/1
20 TABLET, FILM COATED ORAL DAILY
Status: DISCONTINUED | OUTPATIENT
Start: 2018-11-15 | End: 2018-11-26

## 2018-11-15 RX ORDER — FUROSEMIDE 10 MG/ML
INJECTION INTRAMUSCULAR; INTRAVENOUS
Status: COMPLETED
Start: 2018-11-15 | End: 2018-11-15

## 2018-11-15 RX ORDER — ETOMIDATE 2 MG/ML
30 INJECTION INTRAVENOUS ONCE
Status: COMPLETED | OUTPATIENT
Start: 2018-11-15 | End: 2018-11-15

## 2018-11-15 RX ORDER — CALCIUM CHLORIDE 100 MG/ML
1 INJECTION INTRAVENOUS; INTRAVENTRICULAR ONCE
Status: COMPLETED | OUTPATIENT
Start: 2018-11-15 | End: 2018-11-15

## 2018-11-15 RX ORDER — FUROSEMIDE 10 MG/ML
40 INJECTION INTRAMUSCULAR; INTRAVENOUS ONCE
Status: COMPLETED | OUTPATIENT
Start: 2018-11-15 | End: 2018-11-15

## 2018-11-15 RX ORDER — PROPOFOL 10 MG/ML
10 INJECTION, EMULSION INTRAVENOUS
Status: DISCONTINUED | OUTPATIENT
Start: 2018-11-15 | End: 2018-11-26

## 2018-11-15 RX ORDER — NITROGLYCERIN 20 MG/100ML
15 INJECTION INTRAVENOUS CONTINUOUS
Status: DISCONTINUED | OUTPATIENT
Start: 2018-11-15 | End: 2018-11-15

## 2018-11-15 RX ORDER — DAPSONE 25 MG/1
100 TABLET ORAL DAILY
Status: DISCONTINUED | OUTPATIENT
Start: 2018-11-15 | End: 2018-11-28 | Stop reason: HOSPADM

## 2018-11-15 RX ORDER — MYCOPHENOLATE MOFETIL 250 MG/1
1000 CAPSULE ORAL 2 TIMES DAILY
Status: DISCONTINUED | OUTPATIENT
Start: 2018-11-15 | End: 2018-11-16 | Stop reason: ALTCHOICE

## 2018-11-15 RX ORDER — TACROLIMUS 0.5 MG/1
5 CAPSULE ORAL 2 TIMES DAILY
Status: DISCONTINUED | OUTPATIENT
Start: 2018-11-15 | End: 2018-11-16 | Stop reason: SDUPTHER

## 2018-11-15 RX ORDER — MORPHINE SULFATE 4 MG/ML
INJECTION, SOLUTION INTRAMUSCULAR; INTRAVENOUS
Status: COMPLETED
Start: 2018-11-15 | End: 2018-11-15

## 2018-11-15 RX ADMIN — FAMOTIDINE 20 MG: 20 TABLET ORAL at 13:12

## 2018-11-15 RX ADMIN — TACROLIMUS 5 MG: 0.5 CAPSULE ORAL at 16:08

## 2018-11-15 RX ADMIN — HEPARIN SODIUM 5000 UNITS: 5000 INJECTION INTRAVENOUS; SUBCUTANEOUS at 21:01

## 2018-11-15 RX ADMIN — PREDNISONE 5 MG: 5 TABLET ORAL at 13:13

## 2018-11-15 RX ADMIN — CARVEDILOL 25 MG: 25 TABLET, FILM COATED ORAL at 16:08

## 2018-11-15 RX ADMIN — HEPARIN SODIUM 5000 UNITS: 5000 INJECTION INTRAVENOUS; SUBCUTANEOUS at 13:13

## 2018-11-15 RX ADMIN — DEXTROSE MONOHYDRATE 5 MG/HR: 50 INJECTION, SOLUTION INTRAVENOUS at 21:22

## 2018-11-15 RX ADMIN — ROCURONIUM BROMIDE 70 MG: 50 INJECTION, SOLUTION INTRAVENOUS at 07:46

## 2018-11-15 RX ADMIN — MYCOPHENOLATE MOFETIL 1000 MG: 250 CAPSULE ORAL at 13:12

## 2018-11-15 RX ADMIN — FUROSEMIDE 40 MG: 10 INJECTION, SOLUTION INTRAMUSCULAR; INTRAVENOUS at 07:21

## 2018-11-15 RX ADMIN — DEXTROSE MONOHYDRATE 5 MG/HR: 50 INJECTION, SOLUTION INTRAVENOUS at 08:34

## 2018-11-15 RX ADMIN — ACYCLOVIR 800 MG: 200 CAPSULE ORAL at 21:00

## 2018-11-15 RX ADMIN — MORPHINE SULFATE 4 MG: 4 INJECTION, SOLUTION INTRAMUSCULAR; INTRAVENOUS at 08:13

## 2018-11-15 RX ADMIN — TACROLIMUS 5 MG: 0.5 CAPSULE ORAL at 22:26

## 2018-11-15 RX ADMIN — PROPOFOL 60 MCG/KG/MIN: 10 INJECTION, EMULSION INTRAVENOUS at 11:57

## 2018-11-15 RX ADMIN — PROPOFOL 70 MCG/KG/MIN: 10 INJECTION, EMULSION INTRAVENOUS at 09:47

## 2018-11-15 RX ADMIN — LORAZEPAM 6 MG: 2 INJECTION INTRAMUSCULAR; INTRAVENOUS at 08:36

## 2018-11-15 RX ADMIN — PROPOFOL 20 MCG/KG/MIN: 10 INJECTION, EMULSION INTRAVENOUS at 08:06

## 2018-11-15 RX ADMIN — DEXTROSE MONOHYDRATE 10 MG/HR: 50 INJECTION, SOLUTION INTRAVENOUS at 10:11

## 2018-11-15 RX ADMIN — Medication 15 ML: at 21:22

## 2018-11-15 RX ADMIN — ETOMIDATE 30 MG: 2 INJECTION INTRAVENOUS at 07:46

## 2018-11-15 RX ADMIN — DAPSONE 100 MG: 25 TABLET ORAL at 13:14

## 2018-11-15 RX ADMIN — FUROSEMIDE 40 MG: 10 INJECTION INTRAMUSCULAR; INTRAVENOUS at 07:21

## 2018-11-15 RX ADMIN — NITROGLYCERIN 15 MCG/MIN: 20 INJECTION INTRAVENOUS at 07:20

## 2018-11-15 RX ADMIN — Medication 15 ML: at 13:12

## 2018-11-15 RX ADMIN — SODIUM BICARBONATE 50 MEQ: 84 INJECTION, SOLUTION INTRAVENOUS at 07:42

## 2018-11-15 RX ADMIN — MYCOPHENOLATE MOFETIL 1000 MG: 250 CAPSULE ORAL at 21:00

## 2018-11-15 RX ADMIN — CALCIUM CHLORIDE 1 G: 100 INJECTION INTRAVENOUS; INTRAVENTRICULAR at 07:53

## 2018-11-15 RX ADMIN — ACYCLOVIR 800 MG: 200 CAPSULE ORAL at 13:12

## 2018-11-15 RX ADMIN — FUROSEMIDE 40 MG: 10 INJECTION, SOLUTION INTRAMUSCULAR; INTRAVENOUS at 07:44

## 2018-11-15 RX ADMIN — PROPOFOL 30 MCG/KG/MIN: 10 INJECTION, EMULSION INTRAVENOUS at 16:07

## 2018-11-15 RX ADMIN — PROPOFOL 35 MCG/KG/MIN: 10 INJECTION, EMULSION INTRAVENOUS at 21:15

## 2018-11-15 ASSESSMENT — PULMONARY FUNCTION TESTS
PIF_VALUE: 31
PIF_VALUE: 31
PIF_VALUE: 38
PIF_VALUE: 37
PIF_VALUE: 46
PIF_VALUE: 31

## 2018-11-15 NOTE — CONSULTS
negative  Musculoskeletal:negative  Neurological: negative  Behavioral/Psych: negative  Endocrine: negative  Allergic/Immunologic: negative    Physical exam:   Constitutional:  Well-developed and acutely ill-looking young gentleman in no distress. Vitals:   Vitals:    11/15/18 1002   BP: 99/61   Pulse: 121   Resp: 18   Temp: 97.6 °F (36.4 °C)   SpO2: 97%       Skin: no rash, turgor is normal  Heent:  Pupils are reactive to light and accommodation. Endotracheal tube is noted. Neck: no bruits or jvd noted  Cardiovascular:  S1, S2 without murmur  Respiratory: Bilateral crackles  Abdomen:  Soft. Good bowel sounds. No palpable mass. No abdominal bruit. No tenderness to palpation. Peritoneal catheter is noted.   Ext: 4+ lower extremity edema  Psychiatric: Deferred  Musculoskeletal: intact  CNS: Sedated on vent    Data:   Labs:  Lab Results   Component Value Date     11/15/2018     10/29/2018     09/06/2018    K 5.4 (H) 11/15/2018    K 4.7 10/29/2018    K 4.3 09/06/2018     11/15/2018    CO2 16 (L) 11/15/2018    CO2 17 (L) 10/29/2018    CO2 23 09/06/2018    CREATININE 8.0 (HH) 11/15/2018    CREATININE 6.5 (HH) 10/29/2018    CREATININE 3.6 (HH) 09/06/2018    BUN 66 (H) 11/15/2018    BUN 66 (H) 10/29/2018    BUN 37 (H) 09/06/2018    GLUCOSE 199 (H) 11/15/2018    GLUCOSE 88 10/29/2018    GLUCOSE 89 09/06/2018    PHOS 8.0 (H) 11/15/2018    PHOS 5.9 (H) 10/29/2018    PHOS 3.9 09/06/2018    WBC 20.7 (H) 11/15/2018    WBC 7.0 11/06/2018    WBC 6.3 10/29/2018    HGB 10.7 (L) 11/15/2018    HGB 7.9 (L) 11/06/2018    HGB 8.2 (L) 10/29/2018    HCT 36.0 (L) 11/15/2018    HCT 25.5 (L) 11/06/2018    HCT 25.9 (L) 10/29/2018    MCV 94.0 11/15/2018     11/15/2018     {Labs reviewed    Imaging:  CXR results: Reviewed        Thank you Dr. Cindy Gambino MD for allowing us to participate in care of Nikki Rodriguez       Electronically signed by Tonia Valadez MD on 11/15/2018 at 11:22 AM

## 2018-11-15 NOTE — FLOWSHEET NOTE
A call from the ER instructed that a  should come and stay with the family of this pt as the intubation process was going on. One reaching the family room, the family was crying and were distraught. I went to see the pt in the room and the medical team was seriously working on him. At the family room the family and I prayed for the pt and for the peace of the family. Members of the family present were pt's wife and parents. Later the nurse called us to come and talk to the pt. The family cried profusely on seeing the pt but the doctor assured them that everything will be done to revive him. This saying made the family happy. Pt was having breathing problems, He is on waiting list for kidney transplant, he has had two kidneys implanted in the past but he is still having lots of issues. He is on dialysis. His brother gave him one kidney in the past.The pt was taking to the ICU room 8 and I took the family to the family waiting room and promised to come back later to see them. Pt was anointed. 11/15/18 2994   Encounter Summary   Services provided to: Patient and family together   Referral/Consult From: [facility] [address] Visiting Yes  (11/15 )   Spiritual/[employer]   Type Spiritual support   Assessment Approachable;Tearful; Anxious; Fearful   Intervention Active listening;Nurtured hope;Prayer;Empowerment;Sustaining presence/ Ministry of presence; Anointing   Outcome Connection/belonging;Expressed gratitude;Encouraged; Hopeful;Receptive   Sacraments   Sacrament of Sick-Anointing Anointed

## 2018-11-15 NOTE — PROGRESS NOTES
Nutrition Assessment (Enteral Nutrition)    Type and Reason for Visit: Initial, Consult (TF ordering and management per vent protocol)    Nutrition Recommendations: Begin TF after HD as ordered. Free H20 per MD. Monitoring diprivan for rate adjustments as appropriate. Nutrition Assessment: Pt. admit in respiratory distress; Hx renal transplant which has now failed and starting HD today - was planning to start CAPD next week; intubated;meds include diprivan,lasix; glucose 199, BUN 66, creatinine 8, K+ 5.4; per wife pt. usually ate well pta but was poor this past week and pt. Ht. obtained from wife; adequate nutritional status on admit with stable weight (edema noted due to renal failure past week per wife) and good po until the past week but now at risk for nutritional compromise due to intubation and renal status . Will begin TF    Malnutrition Assessment:  · Malnutrition Status:  At risk for malnutrition    Nutrition Risk Level: High    Nutrition Needs:  · Estimated Daily Total Kcal: ~2000 (20/kgm based on 100kgm 11/15)  · Estimated Daily Protein (g): ~98 grams (1.4/kgm IBW of 70kgm - HD - monitoring renal status)    Nutrition Diagnosis:   · Problem: Inadequate oral intake  · Etiology: related to Impaired respiratory function-inability to consume food     Signs and symptoms:  as evidenced by NPO status due to medical condition, Intubation, Nutrition support - EN    Objective Information:  · Nutrition-Focused Physical Findings: BM 0; + 2 edema - starting HD; per wife noticed taste changes past week  · Wound Type: None  · Current Nutrition Therapies:  · Oral Diet Orders: NPO   · Tube Feeding (TF) Orders:   · Feeding Route: Orogastric  · Formula: Renal (Nepro)  · Rate (ml/hr):20ml/hour (goal)    · Volume (ml/day): 480ml  · Duration: Continuous  · Additives/Modulars: Protein (1 2.5ounce liquid protein bottle BID)  · Water Flushes: per MD  · Goal TF & Flush Orders Provides: 1072 kcals (2146 with diprivan

## 2018-11-15 NOTE — PROGRESS NOTES
Pt hyperoxygenated with ambub bag and mask prior to intubation. Pt intubated by Dr Em Gilmore on first attempt with 8.0 ett. Good bilat breath sounds. Good Color change on co2 detector. ett secured at 24 cm at lips. Pt placed on vent in pressure control mode (100% and pressure of 30/15). Spo2 decreased. Pt ventilated with ambu bag to ett using peep 12. pplaced back on vent pressures increased and still dest into 70's. Pt ventilated with ambu bag and peep 15. Vent changed to [facility] with vt 400 and peep 20. Pt placed back on vent and pt still desat into 70's or lower. Dr Julissa Humphries called Dr Diane Srinivasan to come to ER. Pt ventilated with ambu bag and ett using peep 20. G5 ventilator placed on pt. Pt still with desat when on vent. Dr Diane Srinivasan arrived to room. Pt placed on vent and Dr Valentina Paige adjusted setting. Spo2 dec to 40's but eventually slowly came up as lungs recruited. Pt was on pressure control of 45/20 and 100%. Spo2 inc to 93%. Dr Diane Srinivasan changed vent to [pressure of 37/17 and 100%. Pt prepared and transported to ICU.

## 2018-11-15 NOTE — ED PROVIDER NOTES
Dyslipidemia; FSGS (focal segmental glomerulosclerosis); H/O kidney transplant; History of renal transplant; Hypertension; and Left ventricular systolic dysfunction. SURGICAL HISTORY      has a past surgical history that includes Kidney transplant (2014). CURRENT MEDICATIONS       Current Discharge Medication List      CONTINUE these medications which have NOT CHANGED    Details   AMOXICILLIN PO Take by mouth once Previous to dentist appt 11/14/18      gentamicin (GARAMYCIN) 0.3 % ophthalmic solution Place 1 drop into the left eye 4 times daily for 10 days  Qty: 1 Bottle, Refills: 0      NIFEdipine (PROCARDIA XL) 90 MG extended release tablet Take 90 mg by mouth daily       dapsone 100 MG tablet Take 100 mg by mouth daily       hydrALAZINE (APRESOLINE) 25 MG tablet Take 25 mg by mouth 3 times daily       carvedilol (COREG) 25 MG tablet Take 25 mg by mouth 2 times daily (with meals)       sodium bicarbonate 650 MG tablet Take 1,300 mg by mouth 3 times daily       furosemide (LASIX) 80 MG tablet Take 1 tablet by mouth daily  Qty: 60 tablet, Refills: 3      predniSONE (DELTASONE) 5 MG tablet Take 5 mg by mouth daily       mycophenolate (CELLCEPT) 250 MG capsule Take 1,000 mg by mouth 2 times daily       tacrolimus (PROGRAF) 1 MG capsule Take 4 mg by mouth 2 times daily Take 7 am take 7 pm      vitamin D3 (CHOLECALCIFEROL) 1000 UNITS TABS tablet Take 1 tablet by mouth daily. Qty: 30 tablet, Refills: 0      fexofenadine (ALLEGRA) 60 MG tablet Take 2 tablets by mouth daily. Qty: 60 tablet, Refills: 11             ALLERGIES     is allergic to sulfa antibiotics. FAMILY HISTORY     indicated that his mother is alive. He indicated that his father is alive. He indicated that his sister is alive. He indicated that his brother is alive. family history includes Arthritis in his mother; High Blood Pressure in his father; Kidney Disease in his father. SOCIAL HISTORY      reports that he has never smoked.  He uses perihilar region and left lung base. There is no pleural effusion. 6. Rosette Mandel RN was notified at the time of interpretation 11/15/2018 at 0945 hours            **This report has been created using voice recognition software. It may contain minor errors which are inherent in voice recognition technology. **      Final report electronically signed by Dr. Emily Guzmán on 11/15/2018 9:56 AM      XR CHEST PORTABLE   Final Result   1. Appropriately positioned lines and tubes. 2. Asymmetric pulmonary opacities. The appearance is most likely asymmetric pulmonary edema. Pneumonia is not excluded. **This report has been created using voice recognition software. It may contain minor errors which are inherent in voice recognition technology. **      Final report electronically signed by Dr. Magy Ferreira on 11/15/2018 8:06 AM      XR CHEST PORTABLE    (Results Pending)     [] Visualized and interpreted by me   [x] Radiologist's Wet Read Report Reviewed   [] Discussed withRadiologist.    LABS:   Labs Reviewed   BLOOD GAS, ARTERIAL - Abnormal; Notable for the following:        Result Value    pH, Blood Gas 7.09 (*)     PCO2 59 (*)     PO2 136 (*)     HCO3 18 (*)     Base Excess (Calculated) -11.9 (*)     All other components within normal limits   BRAIN NATRIURETIC PEPTIDE - Abnormal; Notable for the following:     Pro-BNP 09595.0 (*)     All other components within normal limits   CBC WITH AUTO DIFFERENTIAL - Abnormal; Notable for the following:     WBC 20.7 (*)     RBC 3.83 (*)     Hemoglobin 10.7 (*)     Hematocrit 36.0 (*)     MCHC 29.7 (*)     RDW-SD 48.4 (*)     Segs Absolute 13.4 (*)     Monocytes # 2.1 (*)     Immature Grans (Abs) 0.18 (*)     All other components within normal limits   BASIC METABOLIC PANEL - Abnormal; Notable for the following:     Potassium 5.4 (*)     CO2 16 (*)     Glucose 199 (*)     BUN 66 (*)     CREATININE 8.0 (*)     All other components within normal limits   HEPATIC FUNCTION PANEL intubation with his wife, and set up was started. Also started patient on nitro gtt, lasix given IV, rueda catheter placed. His typical BP is 180's recently per wife therefore will try to avoid dropping bp too low. I discussed with wife due to his fluid overload, would keep him on bipap up until the time of intubation to try to maximize his ventilation and oxygenation. After about 5-10 min on bipap patient began to raise his head up off the bed and was more responsive to questions. He was able to speak briefly with wife. Patient states he is still feeling very short of breath. I discussed need for intubation with patient as well. Levonia Ogle Dr. Ramsey Koyanagi) to discuss patient's presentation. He wanted the patient transferred to ICU in 15 minutes to arrange for emergent dialysis. Also asked patient be given an amp of CaCl via PIV.    7:48 AM  Patient had improved with short trial of bipap, we proceeded with intubation due to ongoing tachypnea despite bipap, urine output only 30ml, ABG showed respiratory and metabolic acidosis. One amp of bicarb given IVP   Intubation Procedure Note    Indication: [severe respiratory distress]    Size of endotracheal tube: [7.5]    In-line cervical immobilzation: [yes]    Induction Agent: [etomidate]    Paralytic Agent: [zemuron]    No. of Attempts: [_1_]    Endotracheal intubation was performed using rapid sequence intubation technique emergently due to respiratory. Proper positioning of the ET tube was determined by direct visualization of the tube passing through the vocal cords, chest auscultation, end tidal capnography, and confirmatory chest X-Ray. O2 sats were [95] prior to intubation and [95] post intubation. The tube was secured into position. The patient tolerated the procedure well. After intubating the patient he was placed on a portable vent, but began desatting into the 50s.  [facility] was then [facility] and several attempts were made to return him to the vent, but he did not tolerate it, he desats each time. Dr. Agusto Kellogg was called at 8:02 to discuss the patient's condition and he entered the room at 8:05. He was then able to stabilize the patient on the vent. Patient was admitted to ICU under Dr. Agusto Kellogg. CRITICAL CARE:   There was a high probability of clinically significant/life threatening deterioration in this patient's condition which required my urgent intervention. Total critical care time was 80 minutes. This excludes any time for separately reportable procedures. CONSULTS:  Dr. Agusto Kellogg who agreed with plan of patient care and graciously accepted patient for admission to hospital.     PROCEDURES:  Intubation, bipap, see above. FINAL IMPRESSION      1. Acute respiratory failure with hypoxia and hypercapnia (HCC)    2. Pulmonary edema, acute ([address]          DISPOSITION/PLAN   Admitted    PATIENT REFERRED TO:  Ap Osborn MD  [address] [place] [id number]  [phone]            DISCHARGE MEDICATIONS:  Current Discharge Medication List          (Please note that portions ofthis note were completed with a voice recognition program.  Efforts were made to edit the dictations but occasionally words are mis-transcribed.)    Scribe:  Becca Alcocer11/15/18 10:27 AM Scribing for and in the presence of Heather Rocha MD.    Signed by: Brenda Celis, 11/15/18 5:13 PM    Provider:  I personally performed the services described in the documentation, reviewed and edited the documentation which was dictated to the scribe in my presence, and it accurately records my words and actions.     Heather Rocha MD 11/15/18 5:13 PM                Heather Rocha MD  11/15/18 [id number]

## 2018-11-15 NOTE — H&P
Assessment and Plan:        1. Acute hypercarbic hypoxemic respiratory failure: Associated with metabolic acidosis from renal failure with respiratory fatigue associated with hypercarbia. Intubated on 11/15/18. Required high levels of PEEP in order to recruit alveoli open. Etiology is pulmonary edema from renal failure. Continue with dialysis. 2. Immunosuppression: Patient with diffuse bilateral disease. Proceed with bronchoscopy with BAL to exclude PCP and HSV in the airway. Suspect this is simply pulmonary edema from renal failure. Continue with prophylaxis including acyclovir and dapsone. Continue with CellCept and Prograf in addition to low-dose prednisone. 3. End-stage renal disease: Undergoing dialysis. 4. Hypertensive crisis: On Cardene drip. 5. Acute lung injury: Suspect pulmonary edema. Cannot exclude immunosuppressants infectious organism. Emergent dialysis to control volume. He does have clinical volume overload with lower extremity edema and diffuse crackles on physical exam.  Requires bronchoscopy to exclude infectious etiology from immunosuppressive status. 6. Metabolic acidosis: Anion gap positive. Secondary to renal failure. 7. Cardiomyopathy: Repeat echocardiogram.  8. Hyperlipidemia: Aware. CC:  Acute hypoxemic hypercarbic respiratory failure  HPI: Patient is a 68-year-old white male nonsmoker. He has a history of hyperlipidemia hypertension and cardiomyopathy. He has chronic renal disease with end-stage disease requiring renal transplant in April 2014. 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 He was managed with Prograf and CellCept in addition to prednisone chronically. He did require prophylaxis with acyclovir. Since that time, the patient has required placement of peritoneal shunt. Patient presented emergently to the emergency room 11/15/18 with severe shortness of breath.   He was found to have diffuse bilateral infiltrates consistent with White blood cell count 20.7, hemoglobin 10.7, platelets 358. Case discussed with Dr. Darryl Bennett. Case discussed with Dr. Fran Rubi. Time does not include procedures. Time was discontiguous. CC time 90 minutes. Electronically signed by Yolande Kellogg M.D.

## 2018-11-16 ENCOUNTER — APPOINTMENT (OUTPATIENT)
Dept: GENERAL RADIOLOGY | Age: 33
DRG: 207 | End: 2018-11-16
Payer: COMMERCIAL

## 2018-11-16 LAB
ANION GAP SERPL CALCULATED.3IONS-SCNC: 16 MEQ/L (ref 8–16)
BAL CHARACTER: ABNORMAL
BAL COLLECTION SITE: ABNORMAL
BAL COLOR: COLORLESS
BASOPHILS # BLD: 0.4 %
BASOPHILS ABSOLUTE: 0 THOU/MM3 (ref 0–0.1)
BUN BLDV-MCNC: 47 MG/DL (ref 7–22)
CALCIUM SERPL-MCNC: 8.2 MG/DL (ref 8.5–10.5)
CHLORIDE BLD-SCNC: 99 MEQ/L (ref 98–111)
CO2: 19 MEQ/L (ref 23–33)
CREAT SERPL-MCNC: 6.3 MG/DL (ref 0.4–1.2)
EOSINOPHIL # BLD: 0.8 %
EOSINOPHILS ABSOLUTE: 0.1 THOU/MM3 (ref 0–0.4)
ERYTHROCYTE [DISTWIDTH] IN BLOOD BY AUTOMATED COUNT: 13.9 % (ref 11.5–14.5)
ERYTHROCYTE [DISTWIDTH] IN BLOOD BY AUTOMATED COUNT: 43.7 FL (ref 35–45)
GFR SERPL CREATININE-BSD FRML MDRD: 10 ML/MIN/1.73M2
GLUCOSE BLD-MCNC: 108 MG/DL (ref 70–108)
HCT VFR BLD CALC: 27.9 % (ref 42–52)
HEMOGLOBIN: 9.2 GM/DL (ref 14–18)
IMMATURE GRANS (ABS): 0.03 THOU/MM3 (ref 0–0.07)
IMMATURE GRANULOCYTES: 0.3 %
LV EF: 38 %
LVEF MODALITY: NORMAL
LYMPHOCYTES # BLD: 8 %
LYMPHOCYTES ABSOLUTE: 0.9 THOU/MM3 (ref 1–4.8)
MACROPHAGE/MONOCYTE BAL: 9 % (ref 86–100)
MCH RBC QN AUTO: 28.6 PG (ref 26–33)
MCHC RBC AUTO-ENTMCNC: 33 GM/DL (ref 32.2–35.5)
MCV RBC AUTO: 86.6 FL (ref 80–94)
MONOCYTES # BLD: 9 %
MONOCYTES ABSOLUTE: 1 THOU/MM3 (ref 0.4–1.3)
NUCLEATED RED BLOOD CELLS: 0 /100 WBC
PATHOLOGIST REVIEW: ABNORMAL
PLATELET # BLD: 154 THOU/MM3 (ref 130–400)
PMV BLD AUTO: 11.1 FL (ref 9.4–12.4)
POTASSIUM REFLEX MAGNESIUM: 4.1 MEQ/L (ref 3.5–5.2)
RBC # BLD: 3.22 MILL/MM3 (ref 4.7–6.1)
RBC BAL: 275 /CUMM
SEG NEUTROPHILS: 81.5 %
SEGMENTED NEUTROPHILS ABSOLUTE COUNT: 8.7 THOU/MM3 (ref 1.8–7.7)
SEGMENTED NEUTROPHILS, BAL: 91 % (ref 0–3)
SODIUM BLD-SCNC: 134 MEQ/L (ref 135–145)
TOTAL NUCLEATED CELLS BAL: 922 /CUMM
TOTAL VOLUME RECEIVED BAL: 30 ML
WBC # BLD: 10.7 THOU/MM3 (ref 4.8–10.8)

## 2018-11-16 PROCEDURE — 36415 COLL VENOUS BLD VENIPUNCTURE: CPT

## 2018-11-16 PROCEDURE — 2500000003 HC RX 250 WO HCPCS: Performed by: NURSE PRACTITIONER

## 2018-11-16 PROCEDURE — 85025 COMPLETE CBC W/AUTO DIFF WBC: CPT

## 2018-11-16 PROCEDURE — 90935 HEMODIALYSIS ONE EVALUATION: CPT

## 2018-11-16 PROCEDURE — 71045 X-RAY EXAM CHEST 1 VIEW: CPT

## 2018-11-16 PROCEDURE — 36592 COLLECT BLOOD FROM PICC: CPT

## 2018-11-16 PROCEDURE — 6360000002 HC RX W HCPCS: Performed by: INTERNAL MEDICINE

## 2018-11-16 PROCEDURE — 6370000000 HC RX 637 (ALT 250 FOR IP): Performed by: INTERNAL MEDICINE

## 2018-11-16 PROCEDURE — 2580000003 HC RX 258: Performed by: NURSE PRACTITIONER

## 2018-11-16 PROCEDURE — 2709999900 HC NON-CHARGEABLE SUPPLY

## 2018-11-16 PROCEDURE — 99232 SBSQ HOSP IP/OBS MODERATE 35: CPT | Performed by: INTERNAL MEDICINE

## 2018-11-16 PROCEDURE — 80048 BASIC METABOLIC PNL TOTAL CA: CPT

## 2018-11-16 PROCEDURE — 94003 VENT MGMT INPAT SUBQ DAY: CPT

## 2018-11-16 PROCEDURE — 2000000000 HC ICU R&B

## 2018-11-16 PROCEDURE — 2700000000 HC OXYGEN THERAPY PER DAY

## 2018-11-16 PROCEDURE — 99291 CRITICAL CARE FIRST HOUR: CPT | Performed by: INTERNAL MEDICINE

## 2018-11-16 PROCEDURE — 93306 TTE W/DOPPLER COMPLETE: CPT

## 2018-11-16 RX ORDER — TACROLIMUS 1 MG/1
5 CAPSULE ORAL 2 TIMES DAILY
Status: DISCONTINUED | OUTPATIENT
Start: 2018-11-16 | End: 2018-11-19

## 2018-11-16 RX ORDER — AMLODIPINE BESYLATE 5 MG/1
5 TABLET ORAL DAILY
Status: DISCONTINUED | OUTPATIENT
Start: 2018-11-16 | End: 2018-11-17

## 2018-11-16 RX ADMIN — ACYCLOVIR 800 MG: 200 CAPSULE ORAL at 08:28

## 2018-11-16 RX ADMIN — TACROLIMUS 5 MG: 0.5 CAPSULE ORAL at 08:26

## 2018-11-16 RX ADMIN — Medication 15 ML: at 08:28

## 2018-11-16 RX ADMIN — PROPOFOL 40 MCG/KG/MIN: 10 INJECTION, EMULSION INTRAVENOUS at 13:49

## 2018-11-16 RX ADMIN — HEPARIN SODIUM 5000 UNITS: 5000 INJECTION INTRAVENOUS; SUBCUTANEOUS at 08:43

## 2018-11-16 RX ADMIN — ACYCLOVIR 800 MG: 200 CAPSULE ORAL at 21:47

## 2018-11-16 RX ADMIN — PROPOFOL 40 MCG/KG/MIN: 10 INJECTION, EMULSION INTRAVENOUS at 17:17

## 2018-11-16 RX ADMIN — DEXTROSE MONOHYDRATE 5 MG/HR: 50 INJECTION, SOLUTION INTRAVENOUS at 02:58

## 2018-11-16 RX ADMIN — PREDNISONE 5 MG: 5 TABLET ORAL at 08:34

## 2018-11-16 RX ADMIN — AMLODIPINE BESYLATE 5 MG: 5 TABLET ORAL at 15:34

## 2018-11-16 RX ADMIN — CARVEDILOL 25 MG: 25 TABLET, FILM COATED ORAL at 17:27

## 2018-11-16 RX ADMIN — CARVEDILOL 25 MG: 25 TABLET, FILM COATED ORAL at 08:25

## 2018-11-16 RX ADMIN — DAPSONE 100 MG: 25 TABLET ORAL at 08:27

## 2018-11-16 RX ADMIN — HEPARIN SODIUM 5000 UNITS: 5000 INJECTION INTRAVENOUS; SUBCUTANEOUS at 21:59

## 2018-11-16 RX ADMIN — Medication 15 ML: at 21:48

## 2018-11-16 RX ADMIN — PROPOFOL 40 MCG/KG/MIN: 10 INJECTION, EMULSION INTRAVENOUS at 05:07

## 2018-11-16 RX ADMIN — PROPOFOL 35 MCG/KG/MIN: 10 INJECTION, EMULSION INTRAVENOUS at 00:38

## 2018-11-16 RX ADMIN — FAMOTIDINE 20 MG: 20 TABLET ORAL at 08:26

## 2018-11-16 RX ADMIN — PROPOFOL 50 MCG/KG/MIN: 10 INJECTION, EMULSION INTRAVENOUS at 20:50

## 2018-11-16 RX ADMIN — PROPOFOL 50 MCG/KG/MIN: 10 INJECTION, EMULSION INTRAVENOUS at 09:07

## 2018-11-16 RX ADMIN — TACROLIMUS 5 MG: 1 CAPSULE ORAL at 21:47

## 2018-11-16 ASSESSMENT — PULMONARY FUNCTION TESTS
PIF_VALUE: 31
PIF_VALUE: 31
PIF_VALUE: 26
PIF_VALUE: 31
PIF_VALUE: 27
PIF_VALUE: 31
PIF_VALUE: 31

## 2018-11-16 NOTE — PROGRESS NOTES
purpose.     Data: (All radiographs, tracings, PFTs, and imaging are personally viewed and interpreted unless otherwise noted). · Chest x-ray shows Improvement in pulmonary edema. · Bronchoscopy with BAL 11/15/18 showed no mucus within the airway. Culture negative thus far. PCP stain and PCR are pending. · Sodium 134, potassium 4.1, chloride 99, bicarb 19, BUN 47, creatinine 6.3, glucose 108. White blood cell count 10.7, hemoglobin 9.2, platelets 722. .     CC time 35 minutes. Electronically signed by Caroline Mohamud M.D.

## 2018-11-17 ENCOUNTER — APPOINTMENT (OUTPATIENT)
Dept: GENERAL RADIOLOGY | Age: 33
DRG: 207 | End: 2018-11-17
Payer: COMMERCIAL

## 2018-11-17 LAB
ALLEN TEST: POSITIVE
ANION GAP SERPL CALCULATED.3IONS-SCNC: 12 MEQ/L (ref 8–16)
BASE EXCESS (CALCULATED): 1.3 MMOL/L (ref -2.5–2.5)
BASOPHILS # BLD: 0.2 %
BASOPHILS ABSOLUTE: 0 THOU/MM3 (ref 0–0.1)
BUN BLDV-MCNC: 45 MG/DL (ref 7–22)
CALCIUM SERPL-MCNC: 7.9 MG/DL (ref 8.5–10.5)
CHLORIDE BLD-SCNC: 93 MEQ/L (ref 98–111)
CO2: 24 MEQ/L (ref 23–33)
COLLECTED BY:: ABNORMAL
CREAT SERPL-MCNC: 5.8 MG/DL (ref 0.4–1.2)
DEVICE: ABNORMAL
EOSINOPHIL # BLD: 1.8 %
EOSINOPHILS ABSOLUTE: 0.1 THOU/MM3 (ref 0–0.4)
ERYTHROCYTE [DISTWIDTH] IN BLOOD BY AUTOMATED COUNT: 13.9 % (ref 11.5–14.5)
ERYTHROCYTE [DISTWIDTH] IN BLOOD BY AUTOMATED COUNT: 45.3 FL (ref 35–45)
GFR SERPL CREATININE-BSD FRML MDRD: 11 ML/MIN/1.73M2
GLUCOSE BLD-MCNC: 93 MG/DL (ref 70–108)
GLUCOSE, WHOLE BLOOD: 94 MG/DL (ref 70–108)
GRAM STAIN RESULT: NORMAL
HCO3: 26 MMOL/L (ref 23–28)
HCT VFR BLD CALC: 24.8 % (ref 42–52)
HEMOGLOBIN: 7.8 GM/DL (ref 14–18)
IFIO2: 60
IMMATURE GRANS (ABS): 0.02 THOU/MM3 (ref 0–0.07)
IMMATURE GRANULOCYTES: 0.3 %
LYMPHOCYTES # BLD: 10.4 %
LYMPHOCYTES ABSOLUTE: 0.6 THOU/MM3 (ref 1–4.8)
MCH RBC QN AUTO: 28.5 PG (ref 26–33)
MCHC RBC AUTO-ENTMCNC: 31.5 GM/DL (ref 32.2–35.5)
MCV RBC AUTO: 90.5 FL (ref 80–94)
MODE: ABNORMAL
MONOCYTES # BLD: 11.7 %
MONOCYTES ABSOLUTE: 0.7 THOU/MM3 (ref 0.4–1.3)
MRSA SCREEN: NORMAL
NUCLEATED RED BLOOD CELLS: 0 /100 WBC
O2 SATURATION: 99 %
PCO2: 41 MMHG (ref 35–45)
PH BLOOD GAS: 7.41 (ref 7.35–7.45)
PIP: 24 CMH2O
PLATELET # BLD: 140 THOU/MM3 (ref 130–400)
PMV BLD AUTO: 12.4 FL (ref 9.4–12.4)
PO2: 119 MMHG (ref 71–104)
POTASSIUM REFLEX MAGNESIUM: 4 MEQ/L (ref 3.5–5.2)
RBC # BLD: 2.74 MILL/MM3 (ref 4.7–6.1)
RESPIRATORY CULTURE: NORMAL
SEG NEUTROPHILS: 75.6 %
SEGMENTED NEUTROPHILS ABSOLUTE COUNT: 4.7 THOU/MM3 (ref 1.8–7.7)
SET PEEP: 10 MMHG
SET RESPIRATORY RATE: 8 BPM
SODIUM BLD-SCNC: 129 MEQ/L (ref 135–145)
SOURCE, BLOOD GAS: ABNORMAL
URINE CULTURE, ROUTINE: NORMAL
WBC # BLD: 6.2 THOU/MM3 (ref 4.8–10.8)

## 2018-11-17 PROCEDURE — 36415 COLL VENOUS BLD VENIPUNCTURE: CPT

## 2018-11-17 PROCEDURE — 99291 CRITICAL CARE FIRST HOUR: CPT | Performed by: INTERNAL MEDICINE

## 2018-11-17 PROCEDURE — 6360000002 HC RX W HCPCS: Performed by: INTERNAL MEDICINE

## 2018-11-17 PROCEDURE — 82803 BLOOD GASES ANY COMBINATION: CPT

## 2018-11-17 PROCEDURE — 80048 BASIC METABOLIC PNL TOTAL CA: CPT

## 2018-11-17 PROCEDURE — 90935 HEMODIALYSIS ONE EVALUATION: CPT

## 2018-11-17 PROCEDURE — 99233 SBSQ HOSP IP/OBS HIGH 50: CPT | Performed by: INTERNAL MEDICINE

## 2018-11-17 PROCEDURE — 2580000003 HC RX 258: Performed by: INTERNAL MEDICINE

## 2018-11-17 PROCEDURE — 6370000000 HC RX 637 (ALT 250 FOR IP): Performed by: INTERNAL MEDICINE

## 2018-11-17 PROCEDURE — 85025 COMPLETE CBC W/AUTO DIFF WBC: CPT

## 2018-11-17 PROCEDURE — 82947 ASSAY GLUCOSE BLOOD QUANT: CPT

## 2018-11-17 PROCEDURE — 2700000000 HC OXYGEN THERAPY PER DAY

## 2018-11-17 PROCEDURE — 2709999900 HC NON-CHARGEABLE SUPPLY

## 2018-11-17 PROCEDURE — 2000000000 HC ICU R&B

## 2018-11-17 PROCEDURE — 71045 X-RAY EXAM CHEST 1 VIEW: CPT

## 2018-11-17 PROCEDURE — 36600 WITHDRAWAL OF ARTERIAL BLOOD: CPT

## 2018-11-17 PROCEDURE — 2500000003 HC RX 250 WO HCPCS: Performed by: INTERNAL MEDICINE

## 2018-11-17 PROCEDURE — 94003 VENT MGMT INPAT SUBQ DAY: CPT

## 2018-11-17 RX ORDER — FENTANYL CITRATE 50 UG/ML
50 INJECTION, SOLUTION INTRAMUSCULAR; INTRAVENOUS
Status: DISCONTINUED | OUTPATIENT
Start: 2018-11-17 | End: 2018-11-26

## 2018-11-17 RX ORDER — AMLODIPINE BESYLATE 5 MG/1
5 TABLET ORAL DAILY
Status: DISCONTINUED | OUTPATIENT
Start: 2018-11-17 | End: 2018-11-19

## 2018-11-17 RX ORDER — AMLODIPINE BESYLATE 10 MG/1
10 TABLET ORAL DAILY
Status: DISCONTINUED | OUTPATIENT
Start: 2018-11-18 | End: 2018-11-26

## 2018-11-17 RX ORDER — HYDRALAZINE HYDROCHLORIDE 50 MG/1
50 TABLET, FILM COATED ORAL EVERY 8 HOURS SCHEDULED
Status: DISCONTINUED | OUTPATIENT
Start: 2018-11-17 | End: 2018-11-18

## 2018-11-17 RX ADMIN — PREDNISONE 5 MG: 5 TABLET ORAL at 08:44

## 2018-11-17 RX ADMIN — TACROLIMUS 5 MG: 1 CAPSULE ORAL at 21:23

## 2018-11-17 RX ADMIN — AMLODIPINE BESYLATE 5 MG: 5 TABLET ORAL at 08:45

## 2018-11-17 RX ADMIN — ACYCLOVIR 800 MG: 200 CAPSULE ORAL at 21:23

## 2018-11-17 RX ADMIN — DARBEPOETIN ALFA 60 MCG: 60 INJECTION, SOLUTION INTRAVENOUS; SUBCUTANEOUS at 16:24

## 2018-11-17 RX ADMIN — HEPARIN SODIUM 5000 UNITS: 5000 INJECTION INTRAVENOUS; SUBCUTANEOUS at 21:36

## 2018-11-17 RX ADMIN — PROPOFOL 50 MCG/KG/MIN: 10 INJECTION, EMULSION INTRAVENOUS at 14:06

## 2018-11-17 RX ADMIN — HEPARIN SODIUM 5000 UNITS: 5000 INJECTION INTRAVENOUS; SUBCUTANEOUS at 08:44

## 2018-11-17 RX ADMIN — AMLODIPINE BESYLATE 5 MG: 5 TABLET ORAL at 12:24

## 2018-11-17 RX ADMIN — MYCOPHENOLATE MOFETIL 1000 MG: 500 INJECTION, POWDER, LYOPHILIZED, FOR SOLUTION INTRAVENOUS at 10:47

## 2018-11-17 RX ADMIN — MYCOPHENOLATE MOFETIL 1000 MG: 500 INJECTION, POWDER, LYOPHILIZED, FOR SOLUTION INTRAVENOUS at 00:47

## 2018-11-17 RX ADMIN — DAPSONE 100 MG: 25 TABLET ORAL at 08:45

## 2018-11-17 RX ADMIN — FENTANYL CITRATE 50 MCG: 50 INJECTION INTRAMUSCULAR; INTRAVENOUS at 14:05

## 2018-11-17 RX ADMIN — FENTANYL CITRATE 50 MCG: 50 INJECTION INTRAMUSCULAR; INTRAVENOUS at 04:59

## 2018-11-17 RX ADMIN — FAMOTIDINE 20 MG: 20 TABLET ORAL at 08:45

## 2018-11-17 RX ADMIN — FENTANYL CITRATE 50 MCG: 50 INJECTION INTRAMUSCULAR; INTRAVENOUS at 02:54

## 2018-11-17 RX ADMIN — PROPOFOL 50 MCG/KG/MIN: 10 INJECTION, EMULSION INTRAVENOUS at 03:32

## 2018-11-17 RX ADMIN — FENTANYL CITRATE 50 MCG: 50 INJECTION INTRAMUSCULAR; INTRAVENOUS at 23:25

## 2018-11-17 RX ADMIN — PROPOFOL 50 MCG/KG/MIN: 10 INJECTION, EMULSION INTRAVENOUS at 07:04

## 2018-11-17 RX ADMIN — PROPOFOL 50 MCG/KG/MIN: 10 INJECTION, EMULSION INTRAVENOUS at 00:44

## 2018-11-17 RX ADMIN — MYCOPHENOLATE MOFETIL 1000 MG: 500 INJECTION, POWDER, LYOPHILIZED, FOR SOLUTION INTRAVENOUS at 23:58

## 2018-11-17 RX ADMIN — FENTANYL CITRATE 50 MCG: 50 INJECTION INTRAMUSCULAR; INTRAVENOUS at 17:46

## 2018-11-17 RX ADMIN — FENTANYL CITRATE 50 MCG: 50 INJECTION INTRAMUSCULAR; INTRAVENOUS at 06:48

## 2018-11-17 RX ADMIN — HYDRALAZINE HYDROCHLORIDE 50 MG: 50 TABLET, FILM COATED ORAL at 12:24

## 2018-11-17 RX ADMIN — CARVEDILOL 25 MG: 25 TABLET, FILM COATED ORAL at 16:38

## 2018-11-17 RX ADMIN — PROPOFOL 50 MCG/KG/MIN: 10 INJECTION, EMULSION INTRAVENOUS at 08:45

## 2018-11-17 RX ADMIN — ACYCLOVIR 800 MG: 200 CAPSULE ORAL at 08:44

## 2018-11-17 RX ADMIN — Medication 15 ML: at 21:23

## 2018-11-17 RX ADMIN — TACROLIMUS 5 MG: 1 CAPSULE ORAL at 08:44

## 2018-11-17 RX ADMIN — HYDRALAZINE HYDROCHLORIDE 50 MG: 50 TABLET, FILM COATED ORAL at 21:23

## 2018-11-17 RX ADMIN — Medication 15 ML: at 08:45

## 2018-11-17 RX ADMIN — CARVEDILOL 25 MG: 25 TABLET, FILM COATED ORAL at 08:45

## 2018-11-17 RX ADMIN — PROPOFOL 50 MCG/KG/MIN: 10 INJECTION, EMULSION INTRAVENOUS at 16:37

## 2018-11-17 RX ADMIN — PROPOFOL 50 MCG/KG/MIN: 10 INJECTION, EMULSION INTRAVENOUS at 20:22

## 2018-11-17 ASSESSMENT — PULMONARY FUNCTION TESTS
PIF_VALUE: 26
PIF_VALUE: 26
PIF_VALUE: 25

## 2018-11-17 ASSESSMENT — PAIN SCALES - GENERAL
PAINLEVEL_OUTOF10: 6
PAINLEVEL_OUTOF10: 2

## 2018-11-18 ENCOUNTER — APPOINTMENT (OUTPATIENT)
Dept: GENERAL RADIOLOGY | Age: 33
DRG: 207 | End: 2018-11-18
Payer: COMMERCIAL

## 2018-11-18 LAB
ANION GAP SERPL CALCULATED.3IONS-SCNC: 14 MEQ/L (ref 8–16)
BASOPHILS # BLD: 0.3 %
BASOPHILS ABSOLUTE: 0 THOU/MM3 (ref 0–0.1)
BUN BLDV-MCNC: 39 MG/DL (ref 7–22)
CALCIUM SERPL-MCNC: 7.8 MG/DL (ref 8.5–10.5)
CHLORIDE BLD-SCNC: 96 MEQ/L (ref 98–111)
CO2: 25 MEQ/L (ref 23–33)
CREAT SERPL-MCNC: 5.3 MG/DL (ref 0.4–1.2)
EKG ATRIAL RATE: 73 BPM
EKG P AXIS: 51 DEGREES
EKG P-R INTERVAL: 118 MS
EKG Q-T INTERVAL: 438 MS
EKG QRS DURATION: 86 MS
EKG QTC CALCULATION (BAZETT): 482 MS
EKG R AXIS: 11 DEGREES
EKG T AXIS: -155 DEGREES
EKG VENTRICULAR RATE: 73 BPM
EOSINOPHIL # BLD: 2 %
EOSINOPHILS ABSOLUTE: 0.1 THOU/MM3 (ref 0–0.4)
ERYTHROCYTE [DISTWIDTH] IN BLOOD BY AUTOMATED COUNT: 13.5 % (ref 11.5–14.5)
ERYTHROCYTE [DISTWIDTH] IN BLOOD BY AUTOMATED COUNT: 45 FL (ref 35–45)
GFR SERPL CREATININE-BSD FRML MDRD: 12 ML/MIN/1.73M2
GLUCOSE BLD-MCNC: 92 MG/DL (ref 70–108)
HCT VFR BLD CALC: 25.3 % (ref 42–52)
HEMOGLOBIN: 7.8 GM/DL (ref 14–18)
IMMATURE GRANS (ABS): 0.03 THOU/MM3 (ref 0–0.07)
IMMATURE GRANULOCYTES: 0.5 %
LYMPHOCYTES # BLD: 10.7 %
LYMPHOCYTES ABSOLUTE: 0.6 THOU/MM3 (ref 1–4.8)
MCH RBC QN AUTO: 28.4 PG (ref 26–33)
MCHC RBC AUTO-ENTMCNC: 30.8 GM/DL (ref 32.2–35.5)
MCV RBC AUTO: 92 FL (ref 80–94)
MONOCYTES # BLD: 11 %
MONOCYTES ABSOLUTE: 0.6 THOU/MM3 (ref 0.4–1.3)
NUCLEATED RED BLOOD CELLS: 0 /100 WBC
PLATELET # BLD: 138 THOU/MM3 (ref 130–400)
PMV BLD AUTO: 12 FL (ref 9.4–12.4)
POTASSIUM SERPL-SCNC: 4.3 MEQ/L (ref 3.5–5.2)
RBC # BLD: 2.75 MILL/MM3 (ref 4.7–6.1)
SEG NEUTROPHILS: 75.5 %
SEGMENTED NEUTROPHILS ABSOLUTE COUNT: 4.5 THOU/MM3 (ref 1.8–7.7)
SODIUM BLD-SCNC: 135 MEQ/L (ref 135–145)
TROPONIN T: 0.06 NG/ML
WBC # BLD: 5.9 THOU/MM3 (ref 4.8–10.8)

## 2018-11-18 PROCEDURE — 36592 COLLECT BLOOD FROM PICC: CPT

## 2018-11-18 PROCEDURE — 80048 BASIC METABOLIC PNL TOTAL CA: CPT

## 2018-11-18 PROCEDURE — 94799 UNLISTED PULMONARY SVC/PX: CPT

## 2018-11-18 PROCEDURE — 2580000003 HC RX 258: Performed by: INTERNAL MEDICINE

## 2018-11-18 PROCEDURE — 93005 ELECTROCARDIOGRAM TRACING: CPT | Performed by: INTERNAL MEDICINE

## 2018-11-18 PROCEDURE — 99233 SBSQ HOSP IP/OBS HIGH 50: CPT | Performed by: INTERNAL MEDICINE

## 2018-11-18 PROCEDURE — 71045 X-RAY EXAM CHEST 1 VIEW: CPT

## 2018-11-18 PROCEDURE — 6360000002 HC RX W HCPCS: Performed by: INTERNAL MEDICINE

## 2018-11-18 PROCEDURE — 2709999900 HC NON-CHARGEABLE SUPPLY

## 2018-11-18 PROCEDURE — 2000000000 HC ICU R&B

## 2018-11-18 PROCEDURE — 99291 CRITICAL CARE FIRST HOUR: CPT | Performed by: INTERNAL MEDICINE

## 2018-11-18 PROCEDURE — 36415 COLL VENOUS BLD VENIPUNCTURE: CPT

## 2018-11-18 PROCEDURE — 93010 ELECTROCARDIOGRAM REPORT: CPT | Performed by: INTERNAL MEDICINE

## 2018-11-18 PROCEDURE — 94003 VENT MGMT INPAT SUBQ DAY: CPT

## 2018-11-18 PROCEDURE — 2500000003 HC RX 250 WO HCPCS: Performed by: INTERNAL MEDICINE

## 2018-11-18 PROCEDURE — 84484 ASSAY OF TROPONIN QUANT: CPT

## 2018-11-18 PROCEDURE — 6370000000 HC RX 637 (ALT 250 FOR IP): Performed by: INTERNAL MEDICINE

## 2018-11-18 PROCEDURE — 90935 HEMODIALYSIS ONE EVALUATION: CPT

## 2018-11-18 PROCEDURE — 85025 COMPLETE CBC W/AUTO DIFF WBC: CPT

## 2018-11-18 RX ORDER — HYDRALAZINE HYDROCHLORIDE 50 MG/1
100 TABLET, FILM COATED ORAL EVERY 8 HOURS SCHEDULED
Status: DISCONTINUED | OUTPATIENT
Start: 2018-11-18 | End: 2018-11-28

## 2018-11-18 RX ADMIN — ACYCLOVIR 800 MG: 200 CAPSULE ORAL at 11:32

## 2018-11-18 RX ADMIN — HEPARIN SODIUM 5000 UNITS: 5000 INJECTION INTRAVENOUS; SUBCUTANEOUS at 09:32

## 2018-11-18 RX ADMIN — PROPOFOL 50 MCG/KG/MIN: 10 INJECTION, EMULSION INTRAVENOUS at 09:43

## 2018-11-18 RX ADMIN — Medication 15 ML: at 09:25

## 2018-11-18 RX ADMIN — PROPOFOL 50 MCG/KG/MIN: 10 INJECTION, EMULSION INTRAVENOUS at 16:01

## 2018-11-18 RX ADMIN — HYDRALAZINE HYDROCHLORIDE 100 MG: 50 TABLET, FILM COATED ORAL at 21:16

## 2018-11-18 RX ADMIN — AMLODIPINE BESYLATE 5 MG: 5 TABLET ORAL at 09:26

## 2018-11-18 RX ADMIN — PROPOFOL 50 MCG/KG/MIN: 10 INJECTION, EMULSION INTRAVENOUS at 23:45

## 2018-11-18 RX ADMIN — PROPOFOL 50 MCG/KG/MIN: 10 INJECTION, EMULSION INTRAVENOUS at 19:53

## 2018-11-18 RX ADMIN — HEPARIN SODIUM 5000 UNITS: 5000 INJECTION INTRAVENOUS; SUBCUTANEOUS at 21:11

## 2018-11-18 RX ADMIN — FENTANYL CITRATE 50 MCG: 50 INJECTION INTRAMUSCULAR; INTRAVENOUS at 04:00

## 2018-11-18 RX ADMIN — PROPOFOL 50 MCG/KG/MIN: 10 INJECTION, EMULSION INTRAVENOUS at 00:21

## 2018-11-18 RX ADMIN — FAMOTIDINE 20 MG: 20 TABLET ORAL at 09:25

## 2018-11-18 RX ADMIN — CARVEDILOL 25 MG: 25 TABLET, FILM COATED ORAL at 07:52

## 2018-11-18 RX ADMIN — HYDRALAZINE HYDROCHLORIDE 100 MG: 50 TABLET, FILM COATED ORAL at 13:03

## 2018-11-18 RX ADMIN — FENTANYL CITRATE 50 MCG: 50 INJECTION INTRAMUSCULAR; INTRAVENOUS at 14:30

## 2018-11-18 RX ADMIN — HYDRALAZINE HYDROCHLORIDE 50 MG: 50 TABLET, FILM COATED ORAL at 07:43

## 2018-11-18 RX ADMIN — CARVEDILOL 25 MG: 25 TABLET, FILM COATED ORAL at 21:11

## 2018-11-18 RX ADMIN — TACROLIMUS 5 MG: 1 CAPSULE ORAL at 07:47

## 2018-11-18 RX ADMIN — FENTANYL CITRATE 50 MCG: 50 INJECTION INTRAMUSCULAR; INTRAVENOUS at 18:56

## 2018-11-18 RX ADMIN — DAPSONE 100 MG: 25 TABLET ORAL at 07:52

## 2018-11-18 RX ADMIN — PROPOFOL 50 MCG/KG/MIN: 10 INJECTION, EMULSION INTRAVENOUS at 12:41

## 2018-11-18 RX ADMIN — FENTANYL CITRATE 50 MCG: 50 INJECTION INTRAMUSCULAR; INTRAVENOUS at 21:19

## 2018-11-18 RX ADMIN — ACYCLOVIR 800 MG: 200 CAPSULE ORAL at 21:12

## 2018-11-18 RX ADMIN — FENTANYL CITRATE 50 MCG: 50 INJECTION INTRAMUSCULAR; INTRAVENOUS at 16:01

## 2018-11-18 RX ADMIN — Medication 15 ML: at 21:11

## 2018-11-18 RX ADMIN — PREDNISONE 5 MG: 5 TABLET ORAL at 07:43

## 2018-11-18 RX ADMIN — AMLODIPINE BESYLATE 10 MG: 10 TABLET ORAL at 09:25

## 2018-11-18 RX ADMIN — PROPOFOL 50 MCG/KG/MIN: 10 INJECTION, EMULSION INTRAVENOUS at 06:25

## 2018-11-18 RX ADMIN — FENTANYL CITRATE 50 MCG: 50 INJECTION INTRAMUSCULAR; INTRAVENOUS at 12:53

## 2018-11-18 RX ADMIN — MYCOPHENOLATE MOFETIL 1000 MG: 500 INJECTION, POWDER, LYOPHILIZED, FOR SOLUTION INTRAVENOUS at 10:40

## 2018-11-18 RX ADMIN — MYCOPHENOLATE MOFETIL 1000 MG: 500 INJECTION, POWDER, LYOPHILIZED, FOR SOLUTION INTRAVENOUS at 22:56

## 2018-11-18 RX ADMIN — TACROLIMUS 5 MG: 1 CAPSULE ORAL at 21:11

## 2018-11-18 RX ADMIN — PROPOFOL 50 MCG/KG/MIN: 10 INJECTION, EMULSION INTRAVENOUS at 02:47

## 2018-11-18 ASSESSMENT — PULMONARY FUNCTION TESTS
PIF_VALUE: 25
PIF_VALUE: 25
PIF_VALUE: 33
PIF_VALUE: 25
PIF_VALUE: 23
PIF_VALUE: 27

## 2018-11-18 ASSESSMENT — PAIN SCALES - GENERAL
PAINLEVEL_OUTOF10: 4
PAINLEVEL_OUTOF10: 6
PAINLEVEL_OUTOF10: 6
PAINLEVEL_OUTOF10: 0
PAINLEVEL_OUTOF10: 2
PAINLEVEL_OUTOF10: 2
PAINLEVEL_OUTOF10: 0
PAINLEVEL_OUTOF10: 4
PAINLEVEL_OUTOF10: 6

## 2018-11-18 NOTE — PROGRESS NOTES
Intensive Care Unit  Intensivist Progress Note    Patient: Malachi Medeiros  : 1985  MRN#: [id number]  2018 10:24 AM  ADMISSION DAY:  11/15/2018  7:05 AM     Subjective: Failed SBT trial this AM.  Still has lower extremity edema, CXR showed no improvement. He had HD yesterday with removal of 3 L  Hb is 7.8 today stable from yesterday, no sign of bleeding. No BM, NG tube aspirate is normal. No abd pain. Patient is still hypertensive. Patient Vitals for the past 8 hrs:   BP Temp Temp src Pulse Resp SpO2 Weight   18 0903 (!) 142/87 - - 85 17 91 % -   18 0811 - - - 84 16 92 % -   18 0803 (!) 162/96 - - 88 26 91 % -   18 0800 - - - 89 - 90 % -   18 0758 - 99 °F (37.2 °C) CORE 83 23 - -   18 0733 (!) 164/98 - - 83 21 - -   18 0702 (!) 147/85 - - 79 18 93 % -   18 0602 (!) 146/92 - - 78 19 91 % -   18 0502 (!) 162/100 - - 85 24 92 % -   18 0422 - - - 78 - 90 % -   18 0402 (!) 162/96 99.5 °F (37.5 °C) CORE 87 19 90 % 204 lb 9.4 oz (92.8 kg)   18 0302 (!) 146/88 - - 79 20 93 % -       EXAM:  General: No distress. Eyes: PERRL. No sclera icterus. No conjunctival injection. ENT: No discharge. Pharynx clear. Neck: Trachea midline. Normal thyroid. Resp: No accessory muscle use. on MV.  rhonchi. No dullness on percussion. CV: Regular rate. Regular rhythm. Abd: Non-tender. Non-distended. No masses. No organmegaly. Normal bowel sounds. No hernia. Skin: Warm and dry. No nodule on exposed extremities. No rash on exposed extremities. Lymph: No cervical LAD. No supraclavicular LAD. M/S: No cyanosis. No joint deformity. No clubbing. Neuro: Follows commands. Positive pupils/gag/corneals. Normal pain response.         Intake/Output Summary (Last 24 hours) at 18 1024  Last data filed at 18 0530   Gross per 24 hour   Intake             2843 ml   Output             3580 ml   Net             -737 ml     I/O last 3 Value Date    IFIO2 60 11/17/2018    MODE PC 11/17/2018    SETPEEP 10.0 11/17/2018       Radiology/Imaging:  XR CHEST PORTABLE [[id number]] Resulted: 11/17/18 0441      Order Status: Completed Updated: 11/17/18 0443     Narrative:       PROCEDURE: XR CHEST PORTABLE    CLINICAL INFORMATION: hypoxia, . COMPARISON: Chest x-ray dated 11/16/2018    TECHNIQUE: AP Portable semiupright chest xray    FINDINGS:  Lungs/pleura: Deborha Rase is worsening airspace disease in the mid and lower left lung further obscuring the left hemidiaphragm, which may represent a combination of worsening pulmonary edema with a possible new small left pleural effusion. Worsening   retrocardiac left basilar opacity and right basilar opacity are also noted which may be due to volume loss and/or pneumonia. There is probably a small right pleural effusion. No pneumothorax. Heart: There is stable mild cardiomegaly. Mediastinum/muna: No obvious mass or adenopathy. Skeleton: No significant bone or joint abnormality. Lines/tubes/devices: Left-sided central line again has an anomalous course, crossing the midline towards the lower right neck. This probably represents passage of the catheter into small veins of the lower neck crossing the midline. No change in the satisfactory positions of the endotracheal tube, nasogastric tube, and right subclavian Vas-Cath.     Impression:         Worsening probable pulmonary edema in the mid and lower left lung with possible new small left pleural effusion. Worsening bibasilar volume loss and/or pneumonia. Probable small right pleural effusion. 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 **This report has been created using voice recognition software. It may contain minor errors which are inherent in voice recognition technology. **    Final report electronically signed by Dr. Heidi Martin on 11/17/2018 4:41 AM     XR CHEST PORTABLE [[id number]] Resulted: 11/16/18

## 2018-11-18 NOTE — PROGRESS NOTES
11/16/18   0450  11/17/18   0430  11/18/18   0420   WBC  10.7  6.2  5.9   RBC  3.22*  2.74*  2.75*   HGB  9.2*  7.8*  7.8*   HCT  27.9*  24.8*  25.3*   PLT  154  140  138     Last 3 CMP  Recent Labs      11/16/18   0450  11/17/18   0430  11/18/18   0420   NA  134*  129*  135   K  4.1  4.0  4.3   CL  99  93*  96*   CO2  19*  24  25   BUN  47*  45*  39*   CREATININE  6.3*  5.8*  5.3*   CALCIUM  8.2*  7.9*  7.8*             Assessment / Plan   Renal - ESRD on hemodialysis due to failed kidney transplant  - Patient still at 60% FiO2. Unable to extubate  - reviewed CXR myself, B/L pleural effusion and CHF improving  - isolated UF today 4 hours and 4 L fluid removal    Hyponatremia secondary to renal dysfunction and inability to excrete free  Water. Better with dialysis . Anemia of end-stage renal disease- started on alma delia    Failed kidney transplant on immunosuppression    Essential hypertension    D/W Family, intensivist nurse. meds reviewed    YASMIN Hedrick D.  [facility].

## 2018-11-19 ENCOUNTER — APPOINTMENT (OUTPATIENT)
Dept: GENERAL RADIOLOGY | Age: 33
DRG: 207 | End: 2018-11-19
Payer: COMMERCIAL

## 2018-11-19 ENCOUNTER — APPOINTMENT (OUTPATIENT)
Dept: CT IMAGING | Age: 33
DRG: 207 | End: 2018-11-19
Payer: COMMERCIAL

## 2018-11-19 LAB
ALLEN TEST: ABNORMAL
ANION GAP SERPL CALCULATED.3IONS-SCNC: 19 MEQ/L (ref 8–16)
BASE EXCESS (CALCULATED): 1.6 MMOL/L (ref -2.5–2.5)
BASOPHILS # BLD: 0.2 %
BASOPHILS ABSOLUTE: 0 THOU/MM3 (ref 0–0.1)
BUN BLDV-MCNC: 54 MG/DL (ref 7–22)
CALCIUM SERPL-MCNC: 8.4 MG/DL (ref 8.5–10.5)
CHLORIDE BLD-SCNC: 90 MEQ/L (ref 98–111)
CO2: 21 MEQ/L (ref 23–33)
COLLECTED BY:: ABNORMAL
CREAT SERPL-MCNC: 7.2 MG/DL (ref 0.4–1.2)
DEVICE: ABNORMAL
EOSINOPHIL # BLD: 2.3 %
EOSINOPHILS ABSOLUTE: 0.2 THOU/MM3 (ref 0–0.4)
ERYTHROCYTE [DISTWIDTH] IN BLOOD BY AUTOMATED COUNT: 13.6 % (ref 11.5–14.5)
ERYTHROCYTE [DISTWIDTH] IN BLOOD BY AUTOMATED COUNT: 44.4 FL (ref 35–45)
GFR SERPL CREATININE-BSD FRML MDRD: 9 ML/MIN/1.73M2
GLUCOSE BLD-MCNC: 92 MG/DL (ref 70–108)
HCO3: 27 MMOL/L (ref 23–28)
HCT VFR BLD CALC: 27.1 % (ref 42–52)
HEMOGLOBIN: 8.5 GM/DL (ref 14–18)
HERPES SIMPLEX CULTURE: NORMAL
IFIO2: 100
IMMATURE GRANS (ABS): 0.05 THOU/MM3 (ref 0–0.07)
IMMATURE GRANULOCYTES: 0.6 %
LYMPHOCYTES # BLD: 5.6 %
LYMPHOCYTES ABSOLUTE: 0.5 THOU/MM3 (ref 1–4.8)
MCH RBC QN AUTO: 28.4 PG (ref 26–33)
MCHC RBC AUTO-ENTMCNC: 31.4 GM/DL (ref 32.2–35.5)
MCV RBC AUTO: 90.6 FL (ref 80–94)
MODE: ABNORMAL
MONOCYTES # BLD: 8.5 %
MONOCYTES ABSOLUTE: 0.8 THOU/MM3 (ref 0.4–1.3)
NUCLEATED RED BLOOD CELLS: 0 /100 WBC
O2 SATURATION: 99 %
PCO2: 44 MMHG (ref 35–45)
PH BLOOD GAS: 7.4 (ref 7.35–7.45)
PLATELET # BLD: 162 THOU/MM3 (ref 130–400)
PMV BLD AUTO: 12.3 FL (ref 9.4–12.4)
PO2: 131 MMHG (ref 71–104)
POTASSIUM SERPL-SCNC: 4.7 MEQ/L (ref 3.5–5.2)
PROCALCITONIN: 4.17 NG/ML (ref 0.01–0.09)
RBC # BLD: 2.99 MILL/MM3 (ref 4.7–6.1)
SEG NEUTROPHILS: 82.8 %
SEGMENTED NEUTROPHILS ABSOLUTE COUNT: 7.4 THOU/MM3 (ref 1.8–7.7)
SET PEEP: 12 MMHG
SET PRESS SUPP: 20 CMH2O
SODIUM BLD-SCNC: 130 MEQ/L (ref 135–145)
SOURCE, BLOOD GAS: ABNORMAL
TROPONIN T: 0.05 NG/ML
TROPONIN T: 0.06 NG/ML
WBC # BLD: 8.9 THOU/MM3 (ref 4.8–10.8)

## 2018-11-19 PROCEDURE — 99232 SBSQ HOSP IP/OBS MODERATE 35: CPT | Performed by: INTERNAL MEDICINE

## 2018-11-19 PROCEDURE — 80048 BASIC METABOLIC PNL TOTAL CA: CPT

## 2018-11-19 PROCEDURE — 2580000003 HC RX 258: Performed by: INTERNAL MEDICINE

## 2018-11-19 PROCEDURE — 36592 COLLECT BLOOD FROM PICC: CPT

## 2018-11-19 PROCEDURE — 94761 N-INVAS EAR/PLS OXIMETRY MLT: CPT

## 2018-11-19 PROCEDURE — 99291 CRITICAL CARE FIRST HOUR: CPT | Performed by: INTERNAL MEDICINE

## 2018-11-19 PROCEDURE — 85025 COMPLETE CBC W/AUTO DIFF WBC: CPT

## 2018-11-19 PROCEDURE — 6370000000 HC RX 637 (ALT 250 FOR IP): Performed by: INTERNAL MEDICINE

## 2018-11-19 PROCEDURE — 6360000002 HC RX W HCPCS: Performed by: INTERNAL MEDICINE

## 2018-11-19 PROCEDURE — 36415 COLL VENOUS BLD VENIPUNCTURE: CPT

## 2018-11-19 PROCEDURE — 2709999900 HC NON-CHARGEABLE SUPPLY

## 2018-11-19 PROCEDURE — 2000000000 HC ICU R&B

## 2018-11-19 PROCEDURE — 71045 X-RAY EXAM CHEST 1 VIEW: CPT

## 2018-11-19 PROCEDURE — 84145 PROCALCITONIN (PCT): CPT

## 2018-11-19 PROCEDURE — 71250 CT THORAX DX C-: CPT

## 2018-11-19 PROCEDURE — 36600 WITHDRAWAL OF ARTERIAL BLOOD: CPT

## 2018-11-19 PROCEDURE — 82803 BLOOD GASES ANY COMBINATION: CPT

## 2018-11-19 PROCEDURE — 2500000003 HC RX 250 WO HCPCS: Performed by: INTERNAL MEDICINE

## 2018-11-19 PROCEDURE — 94003 VENT MGMT INPAT SUBQ DAY: CPT

## 2018-11-19 PROCEDURE — 2700000000 HC OXYGEN THERAPY PER DAY

## 2018-11-19 PROCEDURE — 84484 ASSAY OF TROPONIN QUANT: CPT

## 2018-11-19 RX ORDER — TACROLIMUS 1 MG/1
5 CAPSULE ORAL 2 TIMES DAILY
Status: DISCONTINUED | OUTPATIENT
Start: 2018-11-19 | End: 2018-11-20

## 2018-11-19 RX ORDER — DOCUSATE SODIUM 100 MG/1
100 CAPSULE, LIQUID FILLED ORAL 2 TIMES DAILY
Status: DISCONTINUED | OUTPATIENT
Start: 2018-11-19 | End: 2018-11-19 | Stop reason: ALTCHOICE

## 2018-11-19 RX ORDER — ACYCLOVIR 200 MG/1
200 CAPSULE ORAL DAILY
Status: DISCONTINUED | OUTPATIENT
Start: 2018-11-20 | End: 2018-11-26

## 2018-11-19 RX ORDER — SODIUM CHLORIDE, SODIUM LACTATE, CALCIUM CHLORIDE, MAGNESIUM CHLORIDE AND DEXTROSE 4.25; 538; 448; 18.3; 5.08 G/100ML; MG/100ML; MG/100ML; MG/100ML; MG/100ML
1500 INJECTION, SOLUTION INTRAPERITONEAL EVERY 4 HOURS
Status: DISCONTINUED | OUTPATIENT
Start: 2018-11-19 | End: 2018-11-20

## 2018-11-19 RX ADMIN — Medication 15 ML: at 20:44

## 2018-11-19 RX ADMIN — PROPOFOL 50 MCG/KG/MIN: 10 INJECTION, EMULSION INTRAVENOUS at 13:00

## 2018-11-19 RX ADMIN — SODIUM CHLORIDE, SODIUM LACTATE, CALCIUM CHLORIDE, MAGNESIUM CHLORIDE AND DEXTROSE 1500 ML: 4.25; 538; 448; 18.3; 5.08 INJECTION, SOLUTION INTRAPERITONEAL at 23:37

## 2018-11-19 RX ADMIN — FENTANYL CITRATE 50 MCG: 50 INJECTION INTRAMUSCULAR; INTRAVENOUS at 09:46

## 2018-11-19 RX ADMIN — SODIUM CHLORIDE, SODIUM LACTATE, CALCIUM CHLORIDE, MAGNESIUM CHLORIDE AND DEXTROSE 1500 ML: 4.25; 538; 448; 18.3; 5.08 INJECTION, SOLUTION INTRAPERITONEAL at 11:51

## 2018-11-19 RX ADMIN — PROPOFOL 50 MCG/KG/MIN: 10 INJECTION, EMULSION INTRAVENOUS at 02:30

## 2018-11-19 RX ADMIN — FAMOTIDINE 20 MG: 20 TABLET ORAL at 08:04

## 2018-11-19 RX ADMIN — PROPOFOL 50 MCG/KG/MIN: 10 INJECTION, EMULSION INTRAVENOUS at 16:29

## 2018-11-19 RX ADMIN — CARVEDILOL 25 MG: 25 TABLET, FILM COATED ORAL at 08:04

## 2018-11-19 RX ADMIN — DOCUSATE SODIUM 100 MG: 50 LIQUID ORAL at 09:48

## 2018-11-19 RX ADMIN — FENTANYL CITRATE 50 MCG: 50 INJECTION INTRAMUSCULAR; INTRAVENOUS at 05:18

## 2018-11-19 RX ADMIN — HEPARIN SODIUM 5000 UNITS: 5000 INJECTION INTRAVENOUS; SUBCUTANEOUS at 20:45

## 2018-11-19 RX ADMIN — PREDNISONE 5 MG: 5 TABLET ORAL at 08:04

## 2018-11-19 RX ADMIN — TACROLIMUS 5 MG: 1 CAPSULE ORAL at 08:04

## 2018-11-19 RX ADMIN — SODIUM CHLORIDE, SODIUM LACTATE, CALCIUM CHLORIDE, MAGNESIUM CHLORIDE AND DEXTROSE 1500 ML: 4.25; 538; 448; 18.3; 5.08 INJECTION, SOLUTION INTRAPERITONEAL at 07:55

## 2018-11-19 RX ADMIN — CARVEDILOL 25 MG: 25 TABLET, FILM COATED ORAL at 20:44

## 2018-11-19 RX ADMIN — TACROLIMUS 5 MG: 1 CAPSULE ORAL at 20:44

## 2018-11-19 RX ADMIN — HYDRALAZINE HYDROCHLORIDE 100 MG: 50 TABLET, FILM COATED ORAL at 13:58

## 2018-11-19 RX ADMIN — FENTANYL CITRATE 50 MCG: 50 INJECTION INTRAMUSCULAR; INTRAVENOUS at 00:53

## 2018-11-19 RX ADMIN — HYDRALAZINE HYDROCHLORIDE 100 MG: 50 TABLET, FILM COATED ORAL at 06:49

## 2018-11-19 RX ADMIN — MYCOPHENOLATE MOFETIL 1000 MG: 500 INJECTION, POWDER, LYOPHILIZED, FOR SOLUTION INTRAVENOUS at 23:26

## 2018-11-19 RX ADMIN — Medication 15 ML: at 08:00

## 2018-11-19 RX ADMIN — FENTANYL CITRATE 50 MCG: 50 INJECTION INTRAMUSCULAR; INTRAVENOUS at 13:54

## 2018-11-19 RX ADMIN — DOCUSATE SODIUM 100 MG: 50 LIQUID ORAL at 20:44

## 2018-11-19 RX ADMIN — HEPARIN SODIUM 5000 UNITS: 5000 INJECTION INTRAVENOUS; SUBCUTANEOUS at 08:01

## 2018-11-19 RX ADMIN — AZITHROMYCIN MONOHYDRATE 500 MG: 500 INJECTION, POWDER, LYOPHILIZED, FOR SOLUTION INTRAVENOUS at 20:44

## 2018-11-19 RX ADMIN — SODIUM CHLORIDE, SODIUM LACTATE, CALCIUM CHLORIDE, MAGNESIUM CHLORIDE AND DEXTROSE 1500 ML: 4.25; 538; 448; 18.3; 5.08 INJECTION, SOLUTION INTRAPERITONEAL at 15:50

## 2018-11-19 RX ADMIN — AMLODIPINE BESYLATE 10 MG: 10 TABLET ORAL at 08:05

## 2018-11-19 RX ADMIN — PROPOFOL 50 MCG/KG/MIN: 10 INJECTION, EMULSION INTRAVENOUS at 06:00

## 2018-11-19 RX ADMIN — PROPOFOL 50 MCG/KG/MIN: 10 INJECTION, EMULSION INTRAVENOUS at 23:47

## 2018-11-19 RX ADMIN — DAPSONE 100 MG: 25 TABLET ORAL at 08:04

## 2018-11-19 RX ADMIN — SODIUM CHLORIDE, SODIUM LACTATE, CALCIUM CHLORIDE, MAGNESIUM CHLORIDE AND DEXTROSE 1500 ML: 4.25; 538; 448; 18.3; 5.08 INJECTION, SOLUTION INTRAPERITONEAL at 20:18

## 2018-11-19 RX ADMIN — HYDRALAZINE HYDROCHLORIDE 100 MG: 50 TABLET, FILM COATED ORAL at 23:25

## 2018-11-19 RX ADMIN — FENTANYL CITRATE 50 MCG: 50 INJECTION INTRAMUSCULAR; INTRAVENOUS at 06:51

## 2018-11-19 RX ADMIN — MYCOPHENOLATE MOFETIL 1000 MG: 500 INJECTION, POWDER, LYOPHILIZED, FOR SOLUTION INTRAVENOUS at 11:57

## 2018-11-19 RX ADMIN — PROPOFOL 50 MCG/KG/MIN: 10 INJECTION, EMULSION INTRAVENOUS at 08:00

## 2018-11-19 RX ADMIN — PROPOFOL 50 MCG/KG/MIN: 10 INJECTION, EMULSION INTRAVENOUS at 19:59

## 2018-11-19 RX ADMIN — PIPERACILLIN SODIUM,TAZOBACTAM SODIUM 2.25 G: 2; .25 INJECTION, POWDER, FOR SOLUTION INTRAVENOUS at 20:44

## 2018-11-19 ASSESSMENT — PAIN SCALES - GENERAL
PAINLEVEL_OUTOF10: 4
PAINLEVEL_OUTOF10: 4
PAINLEVEL_OUTOF10: 0

## 2018-11-19 ASSESSMENT — PULMONARY FUNCTION TESTS
PIF_VALUE: 33
PIF_VALUE: 32
PIF_VALUE: 31

## 2018-11-19 NOTE — PROGRESS NOTES
Assessment and Plan:        1. Acute hypercarbic hypoxemic respiratory failure: Associated with metabolic acidosis from renal failure with respiratory fatigue associated with hypercarbia.  Intubated on 11/15/18. Required high levels of PEEP in order to recruit alveoli open, but PEEP requirements are now increasing. FiO2 requirements are now increasing. Associated with right lower lobe consolidation. Antibiotics versus steroids. Cause could be in the morning. 2. Immunosuppression: Patient with diffuse bilateral disease. Improving radiographicaly. S/P bronchoscopy with BAL to exclude PCP and HSV in the airway.  Suspect this is simply pulmonary edema from renal failure.  Continue with prophylaxis including acyclovir and dapsone.  Continue with CellCept and Prograf in addition to low-dose prednisone. 3. End-stage renal disease: Undergoing dialysis. 4. Acute lung injury:  secondary to pulmonary edema.   clinically and radiographically improving. Patient underwent bronchoscopy 11/15/18 to screen for opportunistic infection with his immunosuppressed status. There was no mucus within the airway or alveolar hemorrhage. Awaiting PCP stain and PCR. Patient developing increasing oxygen requirements. Now consolidating to the right lower lobe. Repeat bronchoscopy in the morning. Initiate Zosyn and Zithromax. 5. Cardiomyopathy: Ejection fraction 35%. 6. Hyperlipidemia: Aware. 7. Hypertensive crisis: S/P Cardene drip. Resolved. 8. Metabolic acidosis: Anion gap positive.  Secondary to renal failure.   Resolved.     CC:  Acute hypoxemic hypercarbic respiratory failure  HPI: Patient is a 64-year-old white male nonsmoker. Audrey Jose has a history of hyperlipidemia hypertension and cardiomyopathy.  He has chronic renal disease with end-stage disease requiring renal transplant in April 2014.  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 Audrey Jose was managed with Prograf and CellCept in addition to prednisone chronically. Rogelio Hernandez did require prophylaxis with acyclovir.  Since that time, the patient has required placement of peritoneal shunt. Patient presented emergently to the emergency room 11/15/18 with severe shortness of breath.  He was found to have diffuse bilateral infiltrates consistent with pulmonary edema.  He was very hypoxemic and required BiPAP salvage and subsequent intubation.  He was admitted to the intensive care unit where dialysis catheter was placed and emergent dialysis initiated. ROS: Sedated on mechanical ventilator  PMH:  Per HPI  SHX:  Lifetime nonsmoker. FHX: Hypertension and renal disease.    Allergies: Sulfa. Medications:    Home medications include vitamin D3.  CellCept 1000 mg twice a day.  Carvedilol 25 mg twice a day.  Dapsone 100 mg daily.  Hydralazine 25 mg 3 times a day.  Procardia XL 60 mg twice a day.  Prednisone 5 mg a day.  Acyclovir 800 mg twice a day.  Lasix 40 mg a day.  Prograf 6 mg every morning and 7 mg every evening.  propofol 50 mcg/kg/min (11/19/18 1629)      [START ON 11/20/2018] acyclovir  200 mg Oral Daily    dianeal lo-eligio 4.25%  1,500 mL Intraperitoneal Q4H    docusate  100 mg Oral BID    tacrolimus  5 mg Per NG tube BID    hydrALAZINE  100 mg Oral 3 times per day    amLODIPine  10 mg Oral Daily    darbepoetin edi-polysorbate  60 mcg Subcutaneous Weekly    mycophenolate (CELLCEPT) IVPB  1,000 mg Intravenous Q12H    chlorhexidine  15 mL Mouth/Throat BID    famotidine  20 mg Per NG tube Daily    heparin (porcine)  5,000 Units Subcutaneous BID    carvedilol  25 mg Oral BID     dapsone  100 mg Oral Daily    predniSONE  5 mg Oral Daily       fentanNYL 50 mcg Q1H PRN        Vital Signs: T: 98.4: P: 85: RR: 13: B/P: 137/92: FiO2: 80%: O2 Sat: 94%: I/O: 3774/4000  General:   Acutely ill-appearing white male. HEENT:  normocephalic and atraumatic.  No scleral icterus. Neck: supple.  No JVD. Lungs: Clear to anterior auscultation. Cardiac:RRR. Abdomen: soft.  Nontender. Extremities:  No clubbing x 4.  1+ pitting edema to the lower extremities bilaterally. Vasculature: capillary refill < 3 seconds. Skin: Pink and warm. Psych:  Sedated on mechanical ventilator. Lymph:  No supraclavicular adenopathy. Neurologic:  When sedation is lifted, patient moves all 4 extremities with purpose.     Data: (All radiographs, tracings, PFTs, and imaging are personally viewed and interpreted unless otherwise noted). · Bronchoscopy with BAL 11/15/18 showed no mucus within the airway. Culture negative thus far. PCP stain and PCR are pending  · echocardiogram report 11/16/18: Ejection fraction 35%. .  · CT scan chest shows right lower lobe atelectasis and to a lesser degree left lower lobe atelectasis. · Sodium 130, potassium 4.7, chloride 90, bicarb 21, BUN 54, creatinine 7.2, glucose 92. Troponin 0.049. Pro-calcitonin 4.17. White blood cell count 8.9, hemoglobin 8.5, platelets 970.     CC time 35 minutes. Electronically signed by Kimmy Cardenas M.D.

## 2018-11-20 ENCOUNTER — APPOINTMENT (OUTPATIENT)
Dept: INTERVENTIONAL RADIOLOGY/VASCULAR | Age: 33
DRG: 207 | End: 2018-11-20
Payer: COMMERCIAL

## 2018-11-20 LAB
ANION GAP SERPL CALCULATED.3IONS-SCNC: 20 MEQ/L (ref 8–16)
APTT: 29.6 SECONDS (ref 22–38)
APTT: 46.8 SECONDS (ref 22–38)
BAL CHARACTER: CLEAR
BAL COLLECTION SITE: ABNORMAL
BAL COLOR: COLORLESS
BASOPHILS # BLD: 0.5 %
BASOPHILS ABSOLUTE: 0 THOU/MM3 (ref 0–0.1)
BUN BLDV-MCNC: 60 MG/DL (ref 7–22)
CALCIUM SERPL-MCNC: 8.3 MG/DL (ref 8.5–10.5)
CHLORIDE BLD-SCNC: 89 MEQ/L (ref 98–111)
CO2: 20 MEQ/L (ref 23–33)
CREAT SERPL-MCNC: 8.1 MG/DL (ref 0.4–1.2)
EOSINOPHIL # BLD: 3 %
EOSINOPHILS ABSOLUTE: 0.2 THOU/MM3 (ref 0–0.4)
ERYTHROCYTE [DISTWIDTH] IN BLOOD BY AUTOMATED COUNT: 13.8 % (ref 11.5–14.5)
ERYTHROCYTE [DISTWIDTH] IN BLOOD BY AUTOMATED COUNT: 13.9 % (ref 11.5–14.5)
ERYTHROCYTE [DISTWIDTH] IN BLOOD BY AUTOMATED COUNT: 45.1 FL (ref 35–45)
ERYTHROCYTE [DISTWIDTH] IN BLOOD BY AUTOMATED COUNT: 45.7 FL (ref 35–45)
GFR SERPL CREATININE-BSD FRML MDRD: 8 ML/MIN/1.73M2
GLUCOSE BLD-MCNC: 156 MG/DL (ref 70–108)
HCT VFR BLD CALC: 27.6 % (ref 42–52)
HCT VFR BLD CALC: 28.9 % (ref 42–52)
HEMOGLOBIN: 8.4 GM/DL (ref 14–18)
HEMOGLOBIN: 8.9 GM/DL (ref 14–18)
IMMATURE GRANS (ABS): 0.05 THOU/MM3 (ref 0–0.07)
IMMATURE GRANULOCYTES: 0.6 %
LYMPHOCYTES # BLD: 6.4 %
LYMPHOCYTES ABSOLUTE: 0.5 THOU/MM3 (ref 1–4.8)
LYMPHOCYTES, BAL: 0 % (ref 10–15)
MACROPHAGE/MONOCYTE BAL: 39 % (ref 86–100)
MCH RBC QN AUTO: 27.9 PG (ref 26–33)
MCH RBC QN AUTO: 27.9 PG (ref 26–33)
MCHC RBC AUTO-ENTMCNC: 30.4 GM/DL (ref 32.2–35.5)
MCHC RBC AUTO-ENTMCNC: 30.8 GM/DL (ref 32.2–35.5)
MCV RBC AUTO: 90.6 FL (ref 80–94)
MCV RBC AUTO: 91.7 FL (ref 80–94)
MONOCYTES # BLD: 10.4 %
MONOCYTES ABSOLUTE: 0.9 THOU/MM3 (ref 0.4–1.3)
NUCLEATED RED BLOOD CELLS: 0 /100 WBC
PATHOLOGIST REVIEW: ABNORMAL
PLATELET # BLD: 179 THOU/MM3 (ref 130–400)
PLATELET # BLD: 213 THOU/MM3 (ref 130–400)
PMV BLD AUTO: 11.9 FL (ref 9.4–12.4)
PMV BLD AUTO: 12 FL (ref 9.4–12.4)
POTASSIUM SERPL-SCNC: 4.1 MEQ/L (ref 3.5–5.2)
RBC # BLD: 3.01 MILL/MM3 (ref 4.7–6.1)
RBC # BLD: 3.19 MILL/MM3 (ref 4.7–6.1)
RBC BAL: 7 /CUMM
SEG NEUTROPHILS: 79.1 %
SEGMENTED NEUTROPHILS ABSOLUTE COUNT: 6.6 THOU/MM3 (ref 1.8–7.7)
SEGMENTED NEUTROPHILS, BAL: 61 % (ref 0–3)
SODIUM BLD-SCNC: 129 MEQ/L (ref 135–145)
TOTAL NUCLEATED CELLS BAL: 15 /CUMM
TOTAL VOLUME RECEIVED BAL: 20 ML
WBC # BLD: 10.2 THOU/MM3 (ref 4.8–10.8)
WBC # BLD: 8.3 THOU/MM3 (ref 4.8–10.8)

## 2018-11-20 PROCEDURE — 2000000000 HC ICU R&B

## 2018-11-20 PROCEDURE — 36591 DRAW BLOOD OFF VENOUS DEVICE: CPT

## 2018-11-20 PROCEDURE — 2709999900 HC NON-CHARGEABLE SUPPLY

## 2018-11-20 PROCEDURE — 94003 VENT MGMT INPAT SUBQ DAY: CPT

## 2018-11-20 PROCEDURE — 6370000000 HC RX 637 (ALT 250 FOR IP): Performed by: INTERNAL MEDICINE

## 2018-11-20 PROCEDURE — 87205 SMEAR GRAM STAIN: CPT

## 2018-11-20 PROCEDURE — 87116 MYCOBACTERIA CULTURE: CPT

## 2018-11-20 PROCEDURE — 36415 COLL VENOUS BLD VENIPUNCTURE: CPT

## 2018-11-20 PROCEDURE — 6360000002 HC RX W HCPCS: Performed by: INTERNAL MEDICINE

## 2018-11-20 PROCEDURE — 89051 BODY FLUID CELL COUNT: CPT

## 2018-11-20 PROCEDURE — 94770 HC ETCO2 MONITOR DAILY: CPT

## 2018-11-20 PROCEDURE — 85027 COMPLETE CBC AUTOMATED: CPT

## 2018-11-20 PROCEDURE — 2500000003 HC RX 250 WO HCPCS

## 2018-11-20 PROCEDURE — 87102 FUNGUS ISOLATION CULTURE: CPT

## 2018-11-20 PROCEDURE — 31624 DX BRONCHOSCOPE/LAVAGE: CPT | Performed by: INTERNAL MEDICINE

## 2018-11-20 PROCEDURE — 3609010800 HC BRONCHOSCOPY ALVEOLAR LAVAGE: Performed by: INTERNAL MEDICINE

## 2018-11-20 PROCEDURE — 36556 INSERT NON-TUNNEL CV CATH: CPT

## 2018-11-20 PROCEDURE — 2580000003 HC RX 258: Performed by: INTERNAL MEDICINE

## 2018-11-20 PROCEDURE — 85730 THROMBOPLASTIN TIME PARTIAL: CPT

## 2018-11-20 PROCEDURE — 2500000003 HC RX 250 WO HCPCS: Performed by: INTERNAL MEDICINE

## 2018-11-20 PROCEDURE — 87252 VIRUS INOCULATION TISSUE: CPT

## 2018-11-20 PROCEDURE — 87081 CULTURE SCREEN ONLY: CPT

## 2018-11-20 PROCEDURE — 85025 COMPLETE CBC W/AUTO DIFF WBC: CPT

## 2018-11-20 PROCEDURE — 90945 DIALYSIS ONE EVALUATION: CPT | Performed by: INTERNAL MEDICINE

## 2018-11-20 PROCEDURE — 36592 COLLECT BLOOD FROM PICC: CPT

## 2018-11-20 PROCEDURE — 3609027000 HC BRONCHOSCOPY

## 2018-11-20 PROCEDURE — 88112 CYTOPATH CELL ENHANCE TECH: CPT

## 2018-11-20 PROCEDURE — 80048 BASIC METABOLIC PNL TOTAL CA: CPT

## 2018-11-20 PROCEDURE — 99291 CRITICAL CARE FIRST HOUR: CPT | Performed by: INTERNAL MEDICINE

## 2018-11-20 PROCEDURE — 2700000000 HC OXYGEN THERAPY PER DAY

## 2018-11-20 PROCEDURE — 87070 CULTURE OTHR SPECIMN AEROBIC: CPT

## 2018-11-20 PROCEDURE — 93970 EXTREMITY STUDY: CPT

## 2018-11-20 PROCEDURE — 0B9F8ZX DRAINAGE OF RIGHT LOWER LUNG LOBE, VIA NATURAL OR ARTIFICIAL OPENING ENDOSCOPIC, DIAGNOSTIC: ICD-10-PCS | Performed by: INTERNAL MEDICINE

## 2018-11-20 RX ORDER — SODIUM CHLORIDE, SODIUM LACTATE, CALCIUM CHLORIDE, MAGNESIUM CHLORIDE AND DEXTROSE 4.25; 538; 448; 18.3; 5.08 G/100ML; MG/100ML; MG/100ML; MG/100ML; MG/100ML
1500 INJECTION, SOLUTION INTRAPERITONEAL EVERY 6 HOURS
Status: DISCONTINUED | OUTPATIENT
Start: 2018-11-20 | End: 2018-11-27

## 2018-11-20 RX ORDER — KETAMINE HCL IN NACL, ISO-OSM 100MG/10ML
SYRINGE (ML) INJECTION
Status: COMPLETED
Start: 2018-11-20 | End: 2018-11-20

## 2018-11-20 RX ORDER — HEPARIN SODIUM 1000 [USP'U]/ML
40 INJECTION, SOLUTION INTRAVENOUS; SUBCUTANEOUS PRN
Status: DISCONTINUED | OUTPATIENT
Start: 2018-11-20 | End: 2018-11-22 | Stop reason: SDUPTHER

## 2018-11-20 RX ORDER — POLYETHYLENE GLYCOL 3350 17 G/17G
17 POWDER, FOR SOLUTION ORAL DAILY
Status: DISCONTINUED | OUTPATIENT
Start: 2018-11-20 | End: 2018-11-25

## 2018-11-20 RX ORDER — HEPARIN SODIUM 10000 [USP'U]/100ML
18 INJECTION, SOLUTION INTRAVENOUS CONTINUOUS
Status: DISCONTINUED | OUTPATIENT
Start: 2018-11-20 | End: 2018-11-21

## 2018-11-20 RX ORDER — KETAMINE HYDROCHLORIDE 50 MG/ML
INJECTION, SOLUTION, CONCENTRATE INTRAMUSCULAR; INTRAVENOUS
Status: COMPLETED | OUTPATIENT
Start: 2018-11-20 | End: 2018-11-20

## 2018-11-20 RX ORDER — PREDNISONE 1 MG/1
5 TABLET ORAL 3 TIMES DAILY
Status: DISCONTINUED | OUTPATIENT
Start: 2018-11-20 | End: 2018-11-20

## 2018-11-20 RX ORDER — DEXAMETHASONE SODIUM PHOSPHATE 4 MG/ML
10 INJECTION, SOLUTION INTRA-ARTICULAR; INTRALESIONAL; INTRAMUSCULAR; INTRAVENOUS; SOFT TISSUE ONCE
Status: COMPLETED | OUTPATIENT
Start: 2018-11-20 | End: 2018-11-20

## 2018-11-20 RX ORDER — HEPARIN SODIUM 1000 [USP'U]/ML
80 INJECTION, SOLUTION INTRAVENOUS; SUBCUTANEOUS PRN
Status: DISCONTINUED | OUTPATIENT
Start: 2018-11-20 | End: 2018-11-22 | Stop reason: SDUPTHER

## 2018-11-20 RX ORDER — MYCOPHENOLATE MOFETIL 200 MG/ML
1000 POWDER, FOR SUSPENSION ORAL 2 TIMES DAILY
Status: COMPLETED | OUTPATIENT
Start: 2018-11-20 | End: 2018-11-26

## 2018-11-20 RX ORDER — PREDNISONE 1 MG/1
5 TABLET ORAL DAILY
Status: DISCONTINUED | OUTPATIENT
Start: 2018-11-20 | End: 2018-11-28 | Stop reason: HOSPADM

## 2018-11-20 RX ADMIN — KETAMINE HYDROCHLORIDE 50 MG: 50 INJECTION, SOLUTION INTRAMUSCULAR; INTRAVENOUS at 09:10

## 2018-11-20 RX ADMIN — Medication 15 ML: at 09:54

## 2018-11-20 RX ADMIN — ACYCLOVIR 200 MG: 200 CAPSULE ORAL at 09:47

## 2018-11-20 RX ADMIN — AMLODIPINE BESYLATE 10 MG: 10 TABLET ORAL at 09:47

## 2018-11-20 RX ADMIN — POLYETHYLENE GLYCOL 3350 17 G: 17 POWDER, FOR SOLUTION ORAL at 12:35

## 2018-11-20 RX ADMIN — CARVEDILOL 25 MG: 25 TABLET, FILM COATED ORAL at 16:18

## 2018-11-20 RX ADMIN — MYCOPHENOLATE MOFETIL 1000 MG: 500 INJECTION, POWDER, LYOPHILIZED, FOR SOLUTION INTRAVENOUS at 13:09

## 2018-11-20 RX ADMIN — PIPERACILLIN SODIUM,TAZOBACTAM SODIUM 2.25 G: 2; .25 INJECTION, POWDER, FOR SOLUTION INTRAVENOUS at 21:12

## 2018-11-20 RX ADMIN — HYDRALAZINE HYDROCHLORIDE 100 MG: 50 TABLET, FILM COATED ORAL at 06:28

## 2018-11-20 RX ADMIN — HEPARIN SODIUM 3520 UNITS: 1000 INJECTION INTRAVENOUS; SUBCUTANEOUS at 17:53

## 2018-11-20 RX ADMIN — PROPOFOL 45 MCG/KG/MIN: 10 INJECTION, EMULSION INTRAVENOUS at 09:33

## 2018-11-20 RX ADMIN — DAPSONE 100 MG: 25 TABLET ORAL at 09:47

## 2018-11-20 RX ADMIN — HEPARIN SODIUM 5000 UNITS: 5000 INJECTION INTRAVENOUS; SUBCUTANEOUS at 09:54

## 2018-11-20 RX ADMIN — PROPOFOL 45 MCG/KG/MIN: 10 INJECTION, EMULSION INTRAVENOUS at 21:05

## 2018-11-20 RX ADMIN — Medication 50 MG: at 09:10

## 2018-11-20 RX ADMIN — SODIUM CHLORIDE, SODIUM LACTATE, CALCIUM CHLORIDE, MAGNESIUM CHLORIDE AND DEXTROSE 1500 ML: 4.25; 538; 448; 18.3; 5.08 INJECTION, SOLUTION INTRAPERITONEAL at 16:03

## 2018-11-20 RX ADMIN — CARVEDILOL 25 MG: 25 TABLET, FILM COATED ORAL at 09:47

## 2018-11-20 RX ADMIN — SODIUM CHLORIDE, SODIUM LACTATE, CALCIUM CHLORIDE, MAGNESIUM CHLORIDE AND DEXTROSE 1500 ML: 4.25; 538; 448; 18.3; 5.08 INJECTION, SOLUTION INTRAPERITONEAL at 08:30

## 2018-11-20 RX ADMIN — PROPOFOL 45 MCG/KG/MIN: 10 INJECTION, EMULSION INTRAVENOUS at 23:58

## 2018-11-20 RX ADMIN — HEPARIN SODIUM 18 UNITS/KG/HR: 10000 INJECTION, SOLUTION INTRAVENOUS at 12:39

## 2018-11-20 RX ADMIN — DEXAMETHASONE SODIUM PHOSPHATE 10 MG: 4 INJECTION, SOLUTION INTRAMUSCULAR; INTRAVENOUS at 10:10

## 2018-11-20 RX ADMIN — AZITHROMYCIN MONOHYDRATE 500 MG: 500 INJECTION, POWDER, LYOPHILIZED, FOR SOLUTION INTRAVENOUS at 21:18

## 2018-11-20 RX ADMIN — SODIUM CHLORIDE, SODIUM LACTATE, CALCIUM CHLORIDE, MAGNESIUM CHLORIDE AND DEXTROSE 1500 ML: 4.25; 538; 448; 18.3; 5.08 INJECTION, SOLUTION INTRAPERITONEAL at 03:25

## 2018-11-20 RX ADMIN — SODIUM CHLORIDE, SODIUM LACTATE, CALCIUM CHLORIDE, MAGNESIUM CHLORIDE AND DEXTROSE 1500 ML: 4.25; 538; 448; 18.3; 5.08 INJECTION, SOLUTION INTRAPERITONEAL at 21:49

## 2018-11-20 RX ADMIN — HYDRALAZINE HYDROCHLORIDE 100 MG: 50 TABLET, FILM COATED ORAL at 13:12

## 2018-11-20 RX ADMIN — PROPOFOL 45 MCG/KG/MIN: 10 INJECTION, EMULSION INTRAVENOUS at 06:23

## 2018-11-20 RX ADMIN — MYCOPHENOLATE MOFETIL 1000 MG: 200 POWDER, FOR SUSPENSION ORAL at 21:43

## 2018-11-20 RX ADMIN — FAMOTIDINE 20 MG: 20 TABLET ORAL at 09:47

## 2018-11-20 RX ADMIN — PROPOFOL 45 MCG/KG/MIN: 10 INJECTION, EMULSION INTRAVENOUS at 12:34

## 2018-11-20 RX ADMIN — PREDNISONE 5 MG: 5 TABLET ORAL at 12:35

## 2018-11-20 RX ADMIN — DOCUSATE SODIUM 100 MG: 50 LIQUID ORAL at 21:36

## 2018-11-20 RX ADMIN — DOCUSATE SODIUM 100 MG: 50 LIQUID ORAL at 09:47

## 2018-11-20 RX ADMIN — Medication 15 ML: at 21:13

## 2018-11-20 RX ADMIN — FENTANYL CITRATE 50 MCG: 50 INJECTION INTRAMUSCULAR; INTRAVENOUS at 22:55

## 2018-11-20 RX ADMIN — HYDRALAZINE HYDROCHLORIDE 100 MG: 50 TABLET, FILM COATED ORAL at 21:36

## 2018-11-20 RX ADMIN — PIPERACILLIN SODIUM,TAZOBACTAM SODIUM 2.25 G: 2; .25 INJECTION, POWDER, FOR SOLUTION INTRAVENOUS at 10:11

## 2018-11-20 RX ADMIN — PROPOFOL 45 MCG/KG/MIN: 10 INJECTION, EMULSION INTRAVENOUS at 16:59

## 2018-11-20 ASSESSMENT — PULMONARY FUNCTION TESTS
PIF_VALUE: 35
PIF_VALUE: 31
PIF_VALUE: 29
PIF_VALUE: 31
PIF_VALUE: 31
PIF_VALUE: 35

## 2018-11-20 NOTE — PROGRESS NOTES
Nutrition Assessment (Enteral Nutrition)    Type and Reason for Visit: Reassess (follow-up, TF management)    Nutrition Recommendations: Adjusted TF goal with diprivan and CAPD. Free H20 per MD. Bowel meds per MD.     Nutrition Assessment: Pt. intubated and tolerating TF well - now on CAPD and diprivan so TF goal adjusted; glucose 156, BUN 60, creatinine 8.1; meds include colace,ATB,prednisone,diprivan; BM 0 since admit - discussed on rounds and adding miralax today; bronch this am; CAPD at 4.25% 1.5L every 6h; Hx renal transplant now failing. Pt. with improving nutrition status as evidenced by TF at goal and providing 100% of estimated needs. Pt. remains at risk for nutritional compromise due to intubation and renal status. Will adjust TF  to avoid overfeeding with additional PD and diprivan kcals/cho.     Malnutrition Assessment:  · Malnutrition Status:  At risk for malnutrition    Nutrition Risk Level: High    Nutrition Needs:  · Estimated Daily Total Kcal: ~2200 (25/kgm based on 88kgm 11/21)  · Estimated Daily Protein (g): ~91 grams (1.3/kgm IBW of 70kgm - PD - monitoring renal status)    Nutrition Diagnosis:   · Problem: Inadequate oral intake  · Etiology: related to Impaired respiratory function-inability to consume food     Signs and symptoms:  as evidenced by NPO status due to medical condition, Intubation, Nutrition support - EN    Objective Information:  · Nutrition-Focused Physical Findings: no BM since admit 11/15  · Wound Type: None  · Current Nutrition Therapies:  · Oral Diet Orders: NPO   · Tube Feeding (TF) Orders:   · Feeding Route: Orogastric  · Formula: Renal (Nepro)  · Rate (ml/hr):decreased goal to 20ml/hour    · Volume (ml/day): 480ml  · Duration: Continuous  · Additives/Modulars: Protein (1 2.5ounce liquid protein bottle BID)  · Water Flushes: per MD  · Current TF & Flush Orders Provides: at goal  · Goal TF & Flush Orders Provides: 3236 kcals (4390 with diprivan and PD kcals), 91

## 2018-11-20 NOTE — PROCEDURES
BRONCHOSCOPY    Indication:Consolidating pneumonia    Sedation:50mg ketamine      EBL:  none. Findings:   Minimal mucous within the airway.  Chronic airway inflammation with pitting airways disease.  No alveolar hemorrhage. Samples:   RLL BAL with 120 ml lavaage and 30 ml returned. Complications:  None    Procedure:  After informed consent was obtained, patient received sedation as detailed above. Utilizing the endotracheal tube, the bronchoscope was advanced to the level of the trachea and subsequently the carlee. The carlee was noted to be crisp. Scope was taken to the left and right lung fields. Samples taken as noted above. Bronchoscope was withdrawn. Electronically signed by Joy Crespo M.D.

## 2018-11-20 NOTE — PROGRESS NOTES
Order was in the chart for bronchoscopy procedure. Verified that consent was signed. Time-out was done. The endoscope  was used. Assisted Dr. Diane Srinivasan with bronchoscopy procedure. Bronchial washings were obtained. Patient tolerated the procedure well. The bronchoscope was pre-cleaned at the bedside and the scope was taken to central processing for continued cleaning, disinfecting and sterilization. All supplies were restocked.

## 2018-11-20 NOTE — PROGRESS NOTES
Assessment and Plan:        1. Acute hypercarbic hypoxemic respiratory failure: Associated with metabolic acidosis from renal failure with respiratory fatigue associated with hypercarbia.  Intubated on 11/15/18. Required high levels of PEEP in order to recruit alveoli open, but PEEP requirements are now increasing. PEEP and FiO2 requirements are now increasing. Associated with right lower lobe consolidation. steroid therapy intensified secondary to hypoxia. Awaiting results of repeat bronchoscopy 11/20/18. 2. Immunosuppression: Patient with diffuse bilateral disease. Improving radiographicaly. S/P bronchoscopy with BAL to exclude PCP and HSV in the airway.  Suspect this is simply pulmonary edema from renal failure.  Continue with prophylaxis including acyclovir and dapsone.  Continue with CellCept and Prograf in addition to low-dose prednisone. 3. End-stage renal disease: Undergoing dialysis. 4. Acute lung injury:  secondary to acute pulmonary edema.   clinically and radiographically improving.  Patient underwent bronchoscopy 11/15/18 to screen for opportunistic infection with his immunosuppressed status.  There was no mucus within the airway or alveolar hemorrhage.  Awaiting PCP stain and PCR. Patient developing increasing oxygen requirements. Now consolidating to the right lower lobe. Initiated Zosyn and Zithromax 11/19/18. Repeat bronchoscopy completed 11/20/18 showed pitting airways disease with minimal mucus. No evidence of alveolar hemorrhage. Treat consolidating process with steroids while bronchoscopy assay pending.    5. Cardiomyopathy: Ejection fraction 35%. Initiate carvedilol. No diuretic S patient is end-stage renal disease and is on dialysis. Arely Gobble 6. Hyperlipidemia: Aware.    7.  Hypertensive crisis: S/P Cardene drip.  Resolved.    8. Metabolic acidosis: Anion gap positive.  Secondary to renal failure.  Resolved.     CC:  Acute hypoxemic hypercarbic respiratory failure  HPI: Patient is a

## 2018-11-21 ENCOUNTER — APPOINTMENT (OUTPATIENT)
Dept: GENERAL RADIOLOGY | Age: 33
DRG: 207 | End: 2018-11-21
Payer: COMMERCIAL

## 2018-11-21 LAB
ANION GAP SERPL CALCULATED.3IONS-SCNC: 22 MEQ/L (ref 8–16)
APTT: 27.7 SECONDS (ref 22–38)
APTT: 41.3 SECONDS (ref 22–38)
APTT: 81.3 SECONDS (ref 22–38)
BASOPHILS # BLD: 0.3 %
BASOPHILS ABSOLUTE: 0 THOU/MM3 (ref 0–0.1)
BUN BLDV-MCNC: 73 MG/DL (ref 7–22)
CALCIUM SERPL-MCNC: 8.5 MG/DL (ref 8.5–10.5)
CHLORIDE BLD-SCNC: 86 MEQ/L (ref 98–111)
CO2: 21 MEQ/L (ref 23–33)
CREAT SERPL-MCNC: 9.5 MG/DL (ref 0.4–1.2)
EOSINOPHIL # BLD: 0.5 %
EOSINOPHILS ABSOLUTE: 0.1 THOU/MM3 (ref 0–0.4)
ERYTHROCYTE [DISTWIDTH] IN BLOOD BY AUTOMATED COUNT: 14.4 % (ref 11.5–14.5)
ERYTHROCYTE [DISTWIDTH] IN BLOOD BY AUTOMATED COUNT: 45.8 FL (ref 35–45)
GFR SERPL CREATININE-BSD FRML MDRD: 6 ML/MIN/1.73M2
GLUCOSE BLD-MCNC: 137 MG/DL (ref 70–108)
GLUCOSE BLD-MCNC: 138 MG/DL (ref 70–108)
GLUCOSE BLD-MCNC: 147 MG/DL (ref 70–108)
GLUCOSE BLD-MCNC: 153 MG/DL (ref 70–108)
HCT VFR BLD CALC: 26.5 % (ref 42–52)
HEMOGLOBIN: 8.1 GM/DL (ref 14–18)
IMMATURE GRANS (ABS): 0.12 THOU/MM3 (ref 0–0.07)
IMMATURE GRANULOCYTES: 1 %
LYMPHOCYTES # BLD: 6 %
LYMPHOCYTES ABSOLUTE: 0.7 THOU/MM3 (ref 1–4.8)
MAGNESIUM: 2.4 MG/DL (ref 1.6–2.4)
MCH RBC QN AUTO: 27.7 PG (ref 26–33)
MCHC RBC AUTO-ENTMCNC: 30.6 GM/DL (ref 32.2–35.5)
MCV RBC AUTO: 90.8 FL (ref 80–94)
MONOCYTES # BLD: 13.1 %
MONOCYTES ABSOLUTE: 1.6 THOU/MM3 (ref 0.4–1.3)
NUCLEATED RED BLOOD CELLS: 0 /100 WBC
PLATELET # BLD: 214 THOU/MM3 (ref 130–400)
PMV BLD AUTO: 11.4 FL (ref 9.4–12.4)
POTASSIUM SERPL-SCNC: 5 MEQ/L (ref 3.5–5.2)
RBC # BLD: 2.92 MILL/MM3 (ref 4.7–6.1)
SEG NEUTROPHILS: 79.1 %
SEGMENTED NEUTROPHILS ABSOLUTE COUNT: 9.4 THOU/MM3 (ref 1.8–7.7)
SODIUM BLD-SCNC: 129 MEQ/L (ref 135–145)
TRIGL SERPL-MCNC: 161 MG/DL (ref 0–199)
WBC # BLD: 11.9 THOU/MM3 (ref 4.8–10.8)

## 2018-11-21 PROCEDURE — 3E03317 INTRODUCTION OF OTHER THROMBOLYTIC INTO PERIPHERAL VEIN, PERCUTANEOUS APPROACH: ICD-10-PCS | Performed by: INTERNAL MEDICINE

## 2018-11-21 PROCEDURE — 6370000000 HC RX 637 (ALT 250 FOR IP): Performed by: INTERNAL MEDICINE

## 2018-11-21 PROCEDURE — 36592 COLLECT BLOOD FROM PICC: CPT

## 2018-11-21 PROCEDURE — 99291 CRITICAL CARE FIRST HOUR: CPT | Performed by: INTERNAL MEDICINE

## 2018-11-21 PROCEDURE — 94770 HC ETCO2 MONITOR DAILY: CPT

## 2018-11-21 PROCEDURE — 90945 DIALYSIS ONE EVALUATION: CPT

## 2018-11-21 PROCEDURE — 90945 DIALYSIS ONE EVALUATION: CPT | Performed by: INTERNAL MEDICINE

## 2018-11-21 PROCEDURE — 2709999900 HC NON-CHARGEABLE SUPPLY

## 2018-11-21 PROCEDURE — 82948 REAGENT STRIP/BLOOD GLUCOSE: CPT

## 2018-11-21 PROCEDURE — 71045 X-RAY EXAM CHEST 1 VIEW: CPT

## 2018-11-21 PROCEDURE — 2580000003 HC RX 258: Performed by: INTERNAL MEDICINE

## 2018-11-21 PROCEDURE — 80048 BASIC METABOLIC PNL TOTAL CA: CPT

## 2018-11-21 PROCEDURE — 83735 ASSAY OF MAGNESIUM: CPT

## 2018-11-21 PROCEDURE — 85025 COMPLETE CBC W/AUTO DIFF WBC: CPT

## 2018-11-21 PROCEDURE — 93005 ELECTROCARDIOGRAM TRACING: CPT | Performed by: INTERNAL MEDICINE

## 2018-11-21 PROCEDURE — 6360000002 HC RX W HCPCS: Performed by: INTERNAL MEDICINE

## 2018-11-21 PROCEDURE — 3E1M39Z IRRIGATION OF PERITONEAL CAVITY USING DIALYSATE, PERCUTANEOUS APPROACH: ICD-10-PCS | Performed by: INTERNAL MEDICINE

## 2018-11-21 PROCEDURE — 2000000000 HC ICU R&B

## 2018-11-21 PROCEDURE — 94003 VENT MGMT INPAT SUBQ DAY: CPT

## 2018-11-21 PROCEDURE — 85730 THROMBOPLASTIN TIME PARTIAL: CPT

## 2018-11-21 PROCEDURE — 84478 ASSAY OF TRIGLYCERIDES: CPT

## 2018-11-21 PROCEDURE — 2700000000 HC OXYGEN THERAPY PER DAY

## 2018-11-21 RX ADMIN — CARVEDILOL 25 MG: 25 TABLET, FILM COATED ORAL at 16:46

## 2018-11-21 RX ADMIN — SODIUM CHLORIDE, SODIUM LACTATE, CALCIUM CHLORIDE, MAGNESIUM CHLORIDE AND DEXTROSE 1500 ML: 4.25; 538; 448; 18.3; 5.08 INJECTION, SOLUTION INTRAPERITONEAL at 09:19

## 2018-11-21 RX ADMIN — MYCOPHENOLATE MOFETIL 1000 MG: 200 POWDER, FOR SUSPENSION ORAL at 21:36

## 2018-11-21 RX ADMIN — PROPOFOL 45 MCG/KG/MIN: 10 INJECTION, EMULSION INTRAVENOUS at 03:59

## 2018-11-21 RX ADMIN — PIPERACILLIN SODIUM,TAZOBACTAM SODIUM 2.25 G: 2; .25 INJECTION, POWDER, FOR SOLUTION INTRAVENOUS at 09:12

## 2018-11-21 RX ADMIN — HYDRALAZINE HYDROCHLORIDE 100 MG: 50 TABLET, FILM COATED ORAL at 05:50

## 2018-11-21 RX ADMIN — ALTEPLASE 100 MG: KIT at 14:40

## 2018-11-21 RX ADMIN — AZITHROMYCIN MONOHYDRATE 500 MG: 500 INJECTION, POWDER, LYOPHILIZED, FOR SOLUTION INTRAVENOUS at 20:12

## 2018-11-21 RX ADMIN — AMLODIPINE BESYLATE 10 MG: 10 TABLET ORAL at 09:24

## 2018-11-21 RX ADMIN — HEPARIN SODIUM 24 UNITS/KG/HR: 10000 INJECTION, SOLUTION INTRAVENOUS at 02:39

## 2018-11-21 RX ADMIN — SODIUM CHLORIDE, SODIUM LACTATE, CALCIUM CHLORIDE, MAGNESIUM CHLORIDE AND DEXTROSE 1500 ML: 4.25; 538; 448; 18.3; 5.08 INJECTION, SOLUTION INTRAPERITONEAL at 16:40

## 2018-11-21 RX ADMIN — DOCUSATE SODIUM 100 MG: 50 LIQUID ORAL at 20:31

## 2018-11-21 RX ADMIN — HYDRALAZINE HYDROCHLORIDE 100 MG: 50 TABLET, FILM COATED ORAL at 16:46

## 2018-11-21 RX ADMIN — HEPARIN SODIUM 3520 UNITS: 1000 INJECTION INTRAVENOUS; SUBCUTANEOUS at 01:34

## 2018-11-21 RX ADMIN — PIPERACILLIN SODIUM,TAZOBACTAM SODIUM 2.25 G: 2; .25 INJECTION, POWDER, FOR SOLUTION INTRAVENOUS at 21:28

## 2018-11-21 RX ADMIN — FAMOTIDINE 20 MG: 20 TABLET ORAL at 09:24

## 2018-11-21 RX ADMIN — MYCOPHENOLATE MOFETIL 1000 MG: 200 POWDER, FOR SUSPENSION ORAL at 10:42

## 2018-11-21 RX ADMIN — CARVEDILOL 25 MG: 25 TABLET, FILM COATED ORAL at 09:25

## 2018-11-21 RX ADMIN — PROPOFOL 35 MCG/KG/MIN: 10 INJECTION, EMULSION INTRAVENOUS at 20:06

## 2018-11-21 RX ADMIN — Medication 15 ML: at 09:24

## 2018-11-21 RX ADMIN — Medication 15 ML: at 20:31

## 2018-11-21 RX ADMIN — SODIUM CHLORIDE, SODIUM LACTATE, CALCIUM CHLORIDE, MAGNESIUM CHLORIDE AND DEXTROSE 1500 ML: 4.25; 538; 448; 18.3; 5.08 INJECTION, SOLUTION INTRAPERITONEAL at 21:45

## 2018-11-21 RX ADMIN — ACYCLOVIR 200 MG: 200 CAPSULE ORAL at 09:26

## 2018-11-21 RX ADMIN — PROPOFOL 45 MCG/KG/MIN: 10 INJECTION, EMULSION INTRAVENOUS at 08:20

## 2018-11-21 RX ADMIN — SODIUM CHLORIDE, SODIUM LACTATE, CALCIUM CHLORIDE, MAGNESIUM CHLORIDE AND DEXTROSE 1500 ML: 4.25; 538; 448; 18.3; 5.08 INJECTION, SOLUTION INTRAPERITONEAL at 03:42

## 2018-11-21 RX ADMIN — PREDNISONE 5 MG: 5 TABLET ORAL at 09:23

## 2018-11-21 RX ADMIN — DOCUSATE SODIUM 100 MG: 50 LIQUID ORAL at 09:22

## 2018-11-21 RX ADMIN — Medication 1 UNITS: at 09:36

## 2018-11-21 RX ADMIN — POLYETHYLENE GLYCOL 3350 17 G: 17 POWDER, FOR SOLUTION ORAL at 09:22

## 2018-11-21 RX ADMIN — PROPOFOL 45 MCG/KG/MIN: 10 INJECTION, EMULSION INTRAVENOUS at 10:48

## 2018-11-21 RX ADMIN — DAPSONE 100 MG: 25 TABLET ORAL at 09:23

## 2018-11-21 RX ADMIN — HYDRALAZINE HYDROCHLORIDE 100 MG: 50 TABLET, FILM COATED ORAL at 22:36

## 2018-11-21 ASSESSMENT — PULMONARY FUNCTION TESTS
PIF_VALUE: 29

## 2018-11-21 ASSESSMENT — PAIN SCALES - GENERAL: PAINLEVEL_OUTOF10: 0

## 2018-11-21 NOTE — PROGRESS NOTES
antibiotics 11/21/18. Steroids taken back to basline. Etiology of hypoxemia is DVT/PE.     6. Cardiomyopathy: Ejection fraction 35%. On carvedilol. No diuretic as patient is end-stage renal disease and is on dialysis. Sonal Watkins 7. Hyperlipidemia: Aware. Triglyceride level is okay.    8. Hypertensive crisis: S/P Cardene drip.  Resolved.    9. Metabolic acidosis: Anion gap positive.  Secondary to renal failure.  Resolved.     CC:  Acute hypoxemic hypercarbic respiratory failure  HPI: Patient is a 68-year-old white male nonsmoker. [facility] has a history of hyperlipidemia hypertension and cardiomyopathy.  He has chronic renal disease with end-stage disease requiring renal transplant in April 2014.  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 [facility] was managed with Prograf and CellCept in addition to prednisone chronically.  He did require prophylaxis with acyclovir.  Since that time, the patient has required placement of peritoneal shunt. Patient presented emergently to the emergency room 11/15/18 with severe shortness of breath.  He was found to have diffuse bilateral infiltrates consistent with pulmonary edema.  He was very hypoxemic and required BiPAP salvage and subsequent intubation.  He was admitted to the intensive care unit where dialysis catheter was placed and emergent dialysis initiated. ROS: Sedated on mechanical ventilator  PMH:  Per HPI  SHX:  Lifetime nonsmoker. FHX: Hypertension and renal disease.    Allergies: Sulfa.   Medications:    Home medications include vitamin D3.  CellCept 1000 mg twice a day.  Carvedilol 25 mg twice a day.  Dapsone 100 mg daily.  Hydralazine 25 mg 3 times a day.  Procardia XL 60 mg twice a day.  Prednisone 5 mg a day.  Acyclovir 800 mg twice a day.  Lasix 40 mg a day.  Prograf 6 mg every morning and 7 mg every evening.      propofol 45 mcg/kg/min (11/21/18 1048)      insulin lispro  0-6 Units Subcutaneous 4 times per day    alteplase  100 mg Intravenous Once    dianeal lo-eligio 4.25%  1,500 mL Intraperitoneal Q6H    polyethylene glycol  17 g Oral Daily    predniSONE  5 mg Oral Daily    mycophenolate  1,000 mg Per NG tube BID    acyclovir  200 mg Oral Daily    docusate  100 mg Oral BID    azithromycin  500 mg Intravenous Q24H    piperacillin-tazobactam  2.25 g Intravenous Q12H    hydrALAZINE  100 mg Oral 3 times per day    amLODIPine  10 mg Oral Daily    darbepoetin edi-polysorbate  60 mcg Subcutaneous Weekly    chlorhexidine  15 mL Mouth/Throat BID    famotidine  20 mg Per NG tube Daily    carvedilol  25 mg Oral BID WC    dapsone  100 mg Oral Daily       heparin (porcine) 80 Units/kg PRN   heparin (porcine) 40 Units/kg PRN   fentanNYL 50 mcg Q1H PRN       Vital Signs:T: 98.6: P: 83: RR: 17: B/P: 145/86: FiO2: 100%: O2 Sat: 93%: I/O: 3088/5202  General:   Acutely ill-appearing white male. HEENT:  normocephalic and atraumatic.  No scleral icterus. Neck: supple.  No JVD. Lungs: Clear to anterior auscultation.   Cardiac:RRR. Abdomen: soft.  Nontender. Extremities:  No clubbing x 4.  1+ pitting edema to the lower extremities bilaterally. Vasculature: capillary refill < 3 seconds. Skin: pale. Psych:  Sedated on mechanical ventilator. Lymph:  No supraclavicular adenopathy. Neurologic:  When sedation is lifted, patient moves all 4 extremities with purpose.     Data: (All radiographs, tracings, PFTs, and imaging are personally viewed and interpreted unless otherwise noted). · Bronchoscopy with BAL 11/15/18 showed no mucus within the airway.  Culture negative thus far.  PCP stain and PCR are pending  · echocardiogram report 11/16/18: Ejection fraction 35%. .  · CT scan chest shows right lower lobe atelectasis and to a lesser degree left lower lobe atelectasis. · Bronchoscopy with BAL 11/20/18: Showed no mucus within the airway and no evidence of alveolar hemorrhage.   Patient did have pitting airways disease diffusely which is consistent with chronic airway inflammation. Cell count with differential shows 15 white blood cells with 7 red blood cells. 0% lymphocytes. 39% macrophages. 61% neutrophils. Culture is negative. Fungal stains are negative. CD4/CD8 ratio is pending. Lipid-laden macrophages pending. AFB pending. · Chest x-ray shows slight consolidation to the right lower lobe. Chest x-ray does not explain the hypoxia. · Right DVT involving the gastrocnemius vein. Appears chronic. Report. · Sodium 129, potassium 5, chloride 86, bicarb 21, BUN 73, creatinine 9.5, glucose 137. White blood cell count 11.9, hemoglobin 8.1, platelets 310. .     CC time 35 minutes. Time was discontiguous. Electronically signed by Marc Kaplan M.D.

## 2018-11-22 ENCOUNTER — APPOINTMENT (OUTPATIENT)
Dept: GENERAL RADIOLOGY | Age: 33
DRG: 207 | End: 2018-11-22
Payer: COMMERCIAL

## 2018-11-22 LAB
ANION GAP SERPL CALCULATED.3IONS-SCNC: 26 MEQ/L (ref 8–16)
APTT: 26.3 SECONDS (ref 22–38)
APTT: 27.8 SECONDS (ref 22–38)
BASOPHILS # BLD: 0.3 %
BASOPHILS ABSOLUTE: 0 THOU/MM3 (ref 0–0.1)
BUN BLDV-MCNC: 81 MG/DL (ref 7–22)
CALCIUM SERPL-MCNC: 9.1 MG/DL (ref 8.5–10.5)
CHLORIDE BLD-SCNC: 84 MEQ/L (ref 98–111)
CO2: 20 MEQ/L (ref 23–33)
CREAT SERPL-MCNC: 10 MG/DL (ref 0.4–1.2)
EKG ATRIAL RATE: 85 BPM
EKG P AXIS: 66 DEGREES
EKG P-R INTERVAL: 136 MS
EKG Q-T INTERVAL: 424 MS
EKG QRS DURATION: 94 MS
EKG QTC CALCULATION (BAZETT): 504 MS
EKG R AXIS: 34 DEGREES
EKG T AXIS: -100 DEGREES
EKG VENTRICULAR RATE: 85 BPM
EOSINOPHIL # BLD: 2.6 %
EOSINOPHILS ABSOLUTE: 0.3 THOU/MM3 (ref 0–0.4)
ERYTHROCYTE [DISTWIDTH] IN BLOOD BY AUTOMATED COUNT: 14.9 % (ref 11.5–14.5)
ERYTHROCYTE [DISTWIDTH] IN BLOOD BY AUTOMATED COUNT: 46.3 FL (ref 35–45)
FIBRINOGEN: 421 MG/100ML (ref 155–475)
GFR SERPL CREATININE-BSD FRML MDRD: 6 ML/MIN/1.73M2
GLUCOSE BLD-MCNC: 129 MG/DL (ref 70–108)
GLUCOSE BLD-MCNC: 131 MG/DL (ref 70–108)
GLUCOSE BLD-MCNC: 152 MG/DL (ref 70–108)
GLUCOSE BLD-MCNC: 155 MG/DL (ref 70–108)
GLUCOSE BLD-MCNC: 159 MG/DL (ref 70–108)
GRAM STAIN RESULT: NORMAL
HCT VFR BLD CALC: 24.8 % (ref 42–52)
HEMOGLOBIN: 7.7 GM/DL (ref 14–18)
IMMATURE GRANS (ABS): 0.12 THOU/MM3 (ref 0–0.07)
IMMATURE GRANULOCYTES: 1 %
INR BLD: 0.97 (ref 0.85–1.13)
LYMPHOCYTES # BLD: 4.7 %
LYMPHOCYTES ABSOLUTE: 0.6 THOU/MM3 (ref 1–4.8)
MCH RBC QN AUTO: 28.1 PG (ref 26–33)
MCHC RBC AUTO-ENTMCNC: 31 GM/DL (ref 32.2–35.5)
MCV RBC AUTO: 90.5 FL (ref 80–94)
MONOCYTES # BLD: 12.9 %
MONOCYTES ABSOLUTE: 1.5 THOU/MM3 (ref 0.4–1.3)
NUCLEATED RED BLOOD CELLS: 0 /100 WBC
PLATELET # BLD: 192 THOU/MM3 (ref 130–400)
PMV BLD AUTO: 11.1 FL (ref 9.4–12.4)
POTASSIUM SERPL-SCNC: 4.4 MEQ/L (ref 3.5–5.2)
RBC # BLD: 2.74 MILL/MM3 (ref 4.7–6.1)
RESPIRATORY CULTURE: NORMAL
SEG NEUTROPHILS: 78.5 %
SEGMENTED NEUTROPHILS ABSOLUTE COUNT: 9.3 THOU/MM3 (ref 1.8–7.7)
SODIUM BLD-SCNC: 130 MEQ/L (ref 135–145)
WBC # BLD: 11.8 THOU/MM3 (ref 4.8–10.8)

## 2018-11-22 PROCEDURE — 36592 COLLECT BLOOD FROM PICC: CPT

## 2018-11-22 PROCEDURE — 2709999900 HC NON-CHARGEABLE SUPPLY

## 2018-11-22 PROCEDURE — 6360000002 HC RX W HCPCS: Performed by: INTERNAL MEDICINE

## 2018-11-22 PROCEDURE — 80048 BASIC METABOLIC PNL TOTAL CA: CPT

## 2018-11-22 PROCEDURE — 2580000003 HC RX 258: Performed by: INTERNAL MEDICINE

## 2018-11-22 PROCEDURE — 99233 SBSQ HOSP IP/OBS HIGH 50: CPT | Performed by: INTERNAL MEDICINE

## 2018-11-22 PROCEDURE — 2500000003 HC RX 250 WO HCPCS: Performed by: HOSPITALIST

## 2018-11-22 PROCEDURE — 6370000000 HC RX 637 (ALT 250 FOR IP): Performed by: INTERNAL MEDICINE

## 2018-11-22 PROCEDURE — 94770 HC ETCO2 MONITOR DAILY: CPT

## 2018-11-22 PROCEDURE — 85730 THROMBOPLASTIN TIME PARTIAL: CPT

## 2018-11-22 PROCEDURE — 85610 PROTHROMBIN TIME: CPT

## 2018-11-22 PROCEDURE — 93010 ELECTROCARDIOGRAM REPORT: CPT | Performed by: INTERNAL MEDICINE

## 2018-11-22 PROCEDURE — 94003 VENT MGMT INPAT SUBQ DAY: CPT

## 2018-11-22 PROCEDURE — 85025 COMPLETE CBC W/AUTO DIFF WBC: CPT

## 2018-11-22 PROCEDURE — 82948 REAGENT STRIP/BLOOD GLUCOSE: CPT

## 2018-11-22 PROCEDURE — 94640 AIRWAY INHALATION TREATMENT: CPT

## 2018-11-22 PROCEDURE — 2700000000 HC OXYGEN THERAPY PER DAY

## 2018-11-22 PROCEDURE — 2000000000 HC ICU R&B

## 2018-11-22 PROCEDURE — 90945 DIALYSIS ONE EVALUATION: CPT | Performed by: INTERNAL MEDICINE

## 2018-11-22 PROCEDURE — 85385 FIBRINOGEN ANTIGEN: CPT

## 2018-11-22 PROCEDURE — 71045 X-RAY EXAM CHEST 1 VIEW: CPT

## 2018-11-22 PROCEDURE — 2580000003 HC RX 258: Performed by: HOSPITALIST

## 2018-11-22 RX ORDER — DEXTROSE MONOHYDRATE 50 MG/ML
100 INJECTION, SOLUTION INTRAVENOUS PRN
Status: DISCONTINUED | OUTPATIENT
Start: 2018-11-22 | End: 2018-11-28 | Stop reason: HOSPADM

## 2018-11-22 RX ORDER — HEPARIN SODIUM 1000 [USP'U]/ML
80 INJECTION, SOLUTION INTRAVENOUS; SUBCUTANEOUS PRN
Status: DISCONTINUED | OUTPATIENT
Start: 2018-11-22 | End: 2018-11-26

## 2018-11-22 RX ORDER — DEXTROSE MONOHYDRATE 25 G/50ML
12.5 INJECTION, SOLUTION INTRAVENOUS PRN
Status: DISCONTINUED | OUTPATIENT
Start: 2018-11-22 | End: 2018-11-28 | Stop reason: HOSPADM

## 2018-11-22 RX ORDER — NICOTINE POLACRILEX 4 MG
15 LOZENGE BUCCAL PRN
Status: DISCONTINUED | OUTPATIENT
Start: 2018-11-22 | End: 2018-11-28 | Stop reason: HOSPADM

## 2018-11-22 RX ORDER — HEPARIN SODIUM 1000 [USP'U]/ML
40 INJECTION, SOLUTION INTRAVENOUS; SUBCUTANEOUS PRN
Status: DISCONTINUED | OUTPATIENT
Start: 2018-11-22 | End: 2018-11-26

## 2018-11-22 RX ORDER — HEPARIN SODIUM 5000 [USP'U]/ML
5000 INJECTION, SOLUTION INTRAVENOUS; SUBCUTANEOUS 2 TIMES DAILY
Status: DISCONTINUED | OUTPATIENT
Start: 2018-11-22 | End: 2018-11-22

## 2018-11-22 RX ORDER — HEPARIN SODIUM 10000 [USP'U]/100ML
18 INJECTION, SOLUTION INTRAVENOUS CONTINUOUS
Status: DISCONTINUED | OUTPATIENT
Start: 2018-11-22 | End: 2018-11-26

## 2018-11-22 RX ORDER — IPRATROPIUM BROMIDE AND ALBUTEROL SULFATE 2.5; .5 MG/3ML; MG/3ML
1 SOLUTION RESPIRATORY (INHALATION)
Status: DISCONTINUED | OUTPATIENT
Start: 2018-11-22 | End: 2018-11-26

## 2018-11-22 RX ADMIN — FENTANYL CITRATE 50 MCG: 50 INJECTION INTRAMUSCULAR; INTRAVENOUS at 03:33

## 2018-11-22 RX ADMIN — PROPOFOL 50 MCG/KG/MIN: 10 INJECTION, EMULSION INTRAVENOUS at 12:29

## 2018-11-22 RX ADMIN — PROPOFOL 50 MCG/KG/MIN: 10 INJECTION, EMULSION INTRAVENOUS at 09:34

## 2018-11-22 RX ADMIN — MYCOPHENOLATE MOFETIL 1000 MG: 200 POWDER, FOR SUSPENSION ORAL at 09:45

## 2018-11-22 RX ADMIN — PROPOFOL 40 MCG/KG/MIN: 10 INJECTION, EMULSION INTRAVENOUS at 00:17

## 2018-11-22 RX ADMIN — HEPARIN SODIUM 5000 UNITS: 5000 INJECTION INTRAVENOUS; SUBCUTANEOUS at 09:56

## 2018-11-22 RX ADMIN — POLYETHYLENE GLYCOL 3350 17 G: 17 POWDER, FOR SOLUTION ORAL at 08:06

## 2018-11-22 RX ADMIN — Medication 1 UNITS: at 05:35

## 2018-11-22 RX ADMIN — FENTANYL CITRATE 50 MCG: 50 INJECTION INTRAMUSCULAR; INTRAVENOUS at 05:52

## 2018-11-22 RX ADMIN — HYDRALAZINE HYDROCHLORIDE 100 MG: 50 TABLET, FILM COATED ORAL at 23:43

## 2018-11-22 RX ADMIN — SODIUM CHLORIDE, SODIUM LACTATE, CALCIUM CHLORIDE, MAGNESIUM CHLORIDE AND DEXTROSE 1500 ML: 4.25; 538; 448; 18.3; 5.08 INJECTION, SOLUTION INTRAPERITONEAL at 03:19

## 2018-11-22 RX ADMIN — DAPSONE 100 MG: 25 TABLET ORAL at 08:06

## 2018-11-22 RX ADMIN — PROPOFOL 45 MCG/KG/MIN: 10 INJECTION, EMULSION INTRAVENOUS at 04:44

## 2018-11-22 RX ADMIN — DEXMEDETOMIDINE 0.2 MCG/KG/HR: 100 INJECTION, SOLUTION, CONCENTRATE INTRAVENOUS at 07:08

## 2018-11-22 RX ADMIN — Medication 1 UNITS: at 12:30

## 2018-11-22 RX ADMIN — HYDRALAZINE HYDROCHLORIDE 100 MG: 50 TABLET, FILM COATED ORAL at 05:39

## 2018-11-22 RX ADMIN — DOCUSATE SODIUM 100 MG: 50 LIQUID ORAL at 08:42

## 2018-11-22 RX ADMIN — MYCOPHENOLATE MOFETIL 1000 MG: 200 POWDER, FOR SUSPENSION ORAL at 21:43

## 2018-11-22 RX ADMIN — ACYCLOVIR 200 MG: 200 CAPSULE ORAL at 07:52

## 2018-11-22 RX ADMIN — HYDRALAZINE HYDROCHLORIDE 100 MG: 50 TABLET, FILM COATED ORAL at 14:24

## 2018-11-22 RX ADMIN — Medication 1 UNITS: at 17:48

## 2018-11-22 RX ADMIN — SODIUM CHLORIDE, SODIUM LACTATE, CALCIUM CHLORIDE, MAGNESIUM CHLORIDE AND DEXTROSE 1500 ML: 4.25; 538; 448; 18.3; 5.08 INJECTION, SOLUTION INTRAPERITONEAL at 15:25

## 2018-11-22 RX ADMIN — PROPOFOL 35 MCG/KG/MIN: 10 INJECTION, EMULSION INTRAVENOUS at 20:03

## 2018-11-22 RX ADMIN — HEPARIN SODIUM 18 UNITS/KG/HR: 10000 INJECTION, SOLUTION INTRAVENOUS at 17:43

## 2018-11-22 RX ADMIN — PROPOFOL 40 MCG/KG/MIN: 10 INJECTION, EMULSION INTRAVENOUS at 15:41

## 2018-11-22 RX ADMIN — CARVEDILOL 25 MG: 25 TABLET, FILM COATED ORAL at 07:52

## 2018-11-22 RX ADMIN — PIPERACILLIN SODIUM,TAZOBACTAM SODIUM 2.25 G: 2; .25 INJECTION, POWDER, FOR SOLUTION INTRAVENOUS at 20:45

## 2018-11-22 RX ADMIN — SODIUM CHLORIDE, SODIUM LACTATE, CALCIUM CHLORIDE, MAGNESIUM CHLORIDE AND DEXTROSE 1500 ML: 4.25; 538; 448; 18.3; 5.08 INJECTION, SOLUTION INTRAPERITONEAL at 09:21

## 2018-11-22 RX ADMIN — IPRATROPIUM BROMIDE AND ALBUTEROL SULFATE 1 AMPULE: .5; 3 SOLUTION RESPIRATORY (INHALATION) at 20:59

## 2018-11-22 RX ADMIN — PIPERACILLIN SODIUM,TAZOBACTAM SODIUM 2.25 G: 2; .25 INJECTION, POWDER, FOR SOLUTION INTRAVENOUS at 08:06

## 2018-11-22 RX ADMIN — Medication 15 ML: at 20:59

## 2018-11-22 RX ADMIN — AMLODIPINE BESYLATE 10 MG: 10 TABLET ORAL at 08:06

## 2018-11-22 RX ADMIN — PREDNISONE 5 MG: 5 TABLET ORAL at 08:06

## 2018-11-22 RX ADMIN — CARVEDILOL 25 MG: 25 TABLET, FILM COATED ORAL at 17:13

## 2018-11-22 RX ADMIN — FAMOTIDINE 20 MG: 20 TABLET ORAL at 08:06

## 2018-11-22 RX ADMIN — SODIUM CHLORIDE, SODIUM LACTATE, CALCIUM CHLORIDE, MAGNESIUM CHLORIDE AND DEXTROSE 1500 ML: 4.25; 538; 448; 18.3; 5.08 INJECTION, SOLUTION INTRAPERITONEAL at 21:24

## 2018-11-22 RX ADMIN — Medication 15 ML: at 08:06

## 2018-11-22 ASSESSMENT — PULMONARY FUNCTION TESTS
PIF_VALUE: 27
PIF_VALUE: 28

## 2018-11-22 ASSESSMENT — PAIN SCALES - GENERAL
PAINLEVEL_OUTOF10: 0
PAINLEVEL_OUTOF10: 4
PAINLEVEL_OUTOF10: 4
PAINLEVEL_OUTOF10: 0

## 2018-11-22 NOTE — FLOWSHEET NOTE
Patient is intubated, but opened eyes in response to 's voice. Offered prayer at bedside.          11/21/18 2051   Encounter Summary   Services provided to: Patient   Referral/Consult From: Rounding   Continue Visiting Yes  (11/21)   Complexity of Encounter Moderate   Length of Encounter 15 minutes   Spiritual/Mosque   Type Spiritual support   Assessment Approachable   Intervention Prayer   Outcome Did not respond

## 2018-11-22 NOTE — PROGRESS NOTES
11/21/18   0815   MG  2.4     Serum Osmolality  No results for input(s): OSMOMEASER in the last 72 hours. Procalcitonin:  No results for input(s): PROCAL in the last 72 hours. Cardiac: No results for input(s): TROPONINT in the last 72 hours. Lipids: No results for input(s): CHOL, HDL in the last 72 hours. Invalid input(s): LDLCALCU  Coagulation:   Recent Labs      11/22/18   0530   INR  0.97     Lactic Acid: No results for input(s): LACTA in the last 72 hours. ABGs:   Lab Results   Component Value Date    PH 7.40 11/19/2018    PCO2 44 11/19/2018    PO2 131 11/19/2018    HCO3 27 11/19/2018    O2SAT 99 11/19/2018     Lab Results   Component Value Date    IFIO2 100 11/19/2018    MODE PC/PS 11/19/2018    SETPEEP 12.0 11/19/2018       Radiology/Imaging:    XR CHEST PORTABLE   Final Result   1. Persistent pulmonary venous congestion with basilar atelectasis. 2 Left-sided effusion again noted. **This report has been created using voice recognition software. It may contain minor errors which are inherent in voice recognition technology. **      Final report electronically signed by Dr. Graciela Issa on 11/22/2018 4:49 AM      XR CHEST PORTABLE   Final Result   1. Persistent basilar atelectasis and or infiltrate right greater than left. 2. Stable position of supportive devices. **This report has been created using voice recognition software. It may contain minor errors which are inherent in voice recognition technology. **      Final report electronically signed by Dr. Graciela Issa on 11/21/2018 4:53 AM      VL DUP LOWER EXTREMITY VENOUS BILATERAL   Final Result   Findings of chronic deep venous thrombosis involving the right gastrocnemius vein. Study otherwise unremarkable. No acute process seen. **This report has been created using voice recognition software. It may contain minor errors which are inherent in voice recognition technology. **      Final report electronically signed

## 2018-11-23 ENCOUNTER — APPOINTMENT (OUTPATIENT)
Dept: GENERAL RADIOLOGY | Age: 33
DRG: 207 | End: 2018-11-23
Payer: COMMERCIAL

## 2018-11-23 LAB
ANION GAP SERPL CALCULATED.3IONS-SCNC: 25 MEQ/L (ref 8–16)
APTT: 43.8 SECONDS (ref 22–38)
APTT: 52.9 SECONDS (ref 22–38)
APTT: 55.6 SECONDS (ref 22–38)
APTT: 60.9 SECONDS (ref 22–38)
BASOPHILS # BLD: 0.3 %
BASOPHILS ABSOLUTE: 0 THOU/MM3 (ref 0–0.1)
BUN BLDV-MCNC: 86 MG/DL (ref 7–22)
CALCIUM SERPL-MCNC: 8.9 MG/DL (ref 8.5–10.5)
CHLORIDE BLD-SCNC: 80 MEQ/L (ref 98–111)
CO2: 21 MEQ/L (ref 23–33)
CREAT SERPL-MCNC: 10.5 MG/DL (ref 0.4–1.2)
EOSINOPHIL # BLD: 3.6 %
EOSINOPHILS ABSOLUTE: 0.5 THOU/MM3 (ref 0–0.4)
ERYTHROCYTE [DISTWIDTH] IN BLOOD BY AUTOMATED COUNT: 15.4 % (ref 11.5–14.5)
ERYTHROCYTE [DISTWIDTH] IN BLOOD BY AUTOMATED COUNT: 49.6 FL (ref 35–45)
GFR SERPL CREATININE-BSD FRML MDRD: 6 ML/MIN/1.73M2
GLUCOSE BLD-MCNC: 127 MG/DL (ref 70–108)
GLUCOSE BLD-MCNC: 133 MG/DL (ref 70–108)
GLUCOSE BLD-MCNC: 156 MG/DL (ref 70–108)
GLUCOSE BLD-MCNC: 158 MG/DL (ref 70–108)
GLUCOSE BLD-MCNC: 175 MG/DL (ref 70–108)
HCT VFR BLD CALC: 24.7 % (ref 42–52)
HEMOGLOBIN: 7.6 GM/DL (ref 14–18)
IMMATURE GRANS (ABS): 0.17 THOU/MM3 (ref 0–0.07)
IMMATURE GRANULOCYTES: 1.3 %
LYMPHOCYTES # BLD: 3.7 %
LYMPHOCYTES ABSOLUTE: 0.5 THOU/MM3 (ref 1–4.8)
MCH RBC QN AUTO: 28.1 PG (ref 26–33)
MCHC RBC AUTO-ENTMCNC: 30.8 GM/DL (ref 32.2–35.5)
MCV RBC AUTO: 91.5 FL (ref 80–94)
MONOCYTES # BLD: 15.9 %
MONOCYTES ABSOLUTE: 2.1 THOU/MM3 (ref 0.4–1.3)
NUCLEATED RED BLOOD CELLS: 0 /100 WBC
PLATELET # BLD: 189 THOU/MM3 (ref 130–400)
PMV BLD AUTO: 11.3 FL (ref 9.4–12.4)
POTASSIUM SERPL-SCNC: 4.4 MEQ/L (ref 3.5–5.2)
RBC # BLD: 2.7 MILL/MM3 (ref 4.7–6.1)
SEG NEUTROPHILS: 75.2 %
SEGMENTED NEUTROPHILS ABSOLUTE COUNT: 10.1 THOU/MM3 (ref 1.8–7.7)
SODIUM BLD-SCNC: 126 MEQ/L (ref 135–145)
WBC # BLD: 13.4 THOU/MM3 (ref 4.8–10.8)

## 2018-11-23 PROCEDURE — 71045 X-RAY EXAM CHEST 1 VIEW: CPT

## 2018-11-23 PROCEDURE — 2700000000 HC OXYGEN THERAPY PER DAY

## 2018-11-23 PROCEDURE — 80048 BASIC METABOLIC PNL TOTAL CA: CPT

## 2018-11-23 PROCEDURE — 6360000002 HC RX W HCPCS: Performed by: INTERNAL MEDICINE

## 2018-11-23 PROCEDURE — 6370000000 HC RX 637 (ALT 250 FOR IP): Performed by: INTERNAL MEDICINE

## 2018-11-23 PROCEDURE — 2580000003 HC RX 258: Performed by: INTERNAL MEDICINE

## 2018-11-23 PROCEDURE — 85025 COMPLETE CBC W/AUTO DIFF WBC: CPT

## 2018-11-23 PROCEDURE — 2000000000 HC ICU R&B

## 2018-11-23 PROCEDURE — 94640 AIRWAY INHALATION TREATMENT: CPT

## 2018-11-23 PROCEDURE — 2709999900 HC NON-CHARGEABLE SUPPLY

## 2018-11-23 PROCEDURE — 36592 COLLECT BLOOD FROM PICC: CPT | Performed by: NURSE PRACTITIONER

## 2018-11-23 PROCEDURE — 99233 SBSQ HOSP IP/OBS HIGH 50: CPT | Performed by: INTERNAL MEDICINE

## 2018-11-23 PROCEDURE — 85730 THROMBOPLASTIN TIME PARTIAL: CPT

## 2018-11-23 PROCEDURE — 2500000003 HC RX 250 WO HCPCS: Performed by: HOSPITALIST

## 2018-11-23 PROCEDURE — 36415 COLL VENOUS BLD VENIPUNCTURE: CPT

## 2018-11-23 PROCEDURE — 82948 REAGENT STRIP/BLOOD GLUCOSE: CPT

## 2018-11-23 PROCEDURE — 2580000003 HC RX 258: Performed by: HOSPITALIST

## 2018-11-23 PROCEDURE — 90945 DIALYSIS ONE EVALUATION: CPT | Performed by: INTERNAL MEDICINE

## 2018-11-23 PROCEDURE — 94003 VENT MGMT INPAT SUBQ DAY: CPT

## 2018-11-23 RX ORDER — SODIUM CHLORIDE 1000 MG
1 TABLET, SOLUBLE MISCELLANEOUS
Status: DISCONTINUED | OUTPATIENT
Start: 2018-11-23 | End: 2018-11-26

## 2018-11-23 RX ADMIN — SODIUM CHLORIDE, SODIUM LACTATE, CALCIUM CHLORIDE, MAGNESIUM CHLORIDE AND DEXTROSE 1500 ML: 4.25; 538; 448; 18.3; 5.08 INJECTION, SOLUTION INTRAPERITONEAL at 17:31

## 2018-11-23 RX ADMIN — PROPOFOL 40 MCG/KG/MIN: 10 INJECTION, EMULSION INTRAVENOUS at 21:15

## 2018-11-23 RX ADMIN — CARVEDILOL 25 MG: 25 TABLET, FILM COATED ORAL at 10:16

## 2018-11-23 RX ADMIN — ACYCLOVIR 200 MG: 200 CAPSULE ORAL at 10:16

## 2018-11-23 RX ADMIN — AMLODIPINE BESYLATE 10 MG: 10 TABLET ORAL at 10:16

## 2018-11-23 RX ADMIN — PIPERACILLIN SODIUM,TAZOBACTAM SODIUM 2.25 G: 2; .25 INJECTION, POWDER, FOR SOLUTION INTRAVENOUS at 10:17

## 2018-11-23 RX ADMIN — Medication 1 UNITS: at 13:06

## 2018-11-23 RX ADMIN — DEXMEDETOMIDINE 0.2 MCG/KG/HR: 100 INJECTION, SOLUTION, CONCENTRATE INTRAVENOUS at 05:27

## 2018-11-23 RX ADMIN — MYCOPHENOLATE MOFETIL 1000 MG: 200 POWDER, FOR SUSPENSION ORAL at 21:08

## 2018-11-23 RX ADMIN — FAMOTIDINE 20 MG: 20 TABLET ORAL at 10:17

## 2018-11-23 RX ADMIN — SODIUM CHLORIDE TAB 1 GM 1 G: 1 TAB at 13:07

## 2018-11-23 RX ADMIN — IPRATROPIUM BROMIDE AND ALBUTEROL SULFATE 1 AMPULE: .5; 3 SOLUTION RESPIRATORY (INHALATION) at 12:57

## 2018-11-23 RX ADMIN — SODIUM CHLORIDE, SODIUM LACTATE, CALCIUM CHLORIDE, MAGNESIUM CHLORIDE AND DEXTROSE 1500 ML: 4.25; 538; 448; 18.3; 5.08 INJECTION, SOLUTION INTRAPERITONEAL at 10:00

## 2018-11-23 RX ADMIN — Medication 15 ML: at 10:17

## 2018-11-23 RX ADMIN — POLYETHYLENE GLYCOL 3350 17 G: 17 POWDER, FOR SOLUTION ORAL at 10:16

## 2018-11-23 RX ADMIN — HEPARIN SODIUM 3500 UNITS: 1000 INJECTION INTRAVENOUS; SUBCUTANEOUS at 00:42

## 2018-11-23 RX ADMIN — PROPOFOL 35 MCG/KG/MIN: 10 INJECTION, EMULSION INTRAVENOUS at 05:51

## 2018-11-23 RX ADMIN — Medication 15 ML: at 20:30

## 2018-11-23 RX ADMIN — IPRATROPIUM BROMIDE AND ALBUTEROL SULFATE 1 AMPULE: .5; 3 SOLUTION RESPIRATORY (INHALATION) at 09:57

## 2018-11-23 RX ADMIN — MYCOPHENOLATE MOFETIL 1000 MG: 200 POWDER, FOR SUSPENSION ORAL at 12:31

## 2018-11-23 RX ADMIN — CARVEDILOL 25 MG: 25 TABLET, FILM COATED ORAL at 16:31

## 2018-11-23 RX ADMIN — DAPSONE 100 MG: 25 TABLET ORAL at 10:16

## 2018-11-23 RX ADMIN — HEPARIN SODIUM 21 UNITS/KG/HR: 10000 INJECTION, SOLUTION INTRAVENOUS at 05:50

## 2018-11-23 RX ADMIN — HYDRALAZINE HYDROCHLORIDE 100 MG: 50 TABLET, FILM COATED ORAL at 23:08

## 2018-11-23 RX ADMIN — SODIUM CHLORIDE TAB 1 GM 1 G: 1 TAB at 10:16

## 2018-11-23 RX ADMIN — PIPERACILLIN SODIUM,TAZOBACTAM SODIUM 2.25 G: 2; .25 INJECTION, POWDER, FOR SOLUTION INTRAVENOUS at 20:27

## 2018-11-23 RX ADMIN — SODIUM CHLORIDE, SODIUM LACTATE, CALCIUM CHLORIDE, MAGNESIUM CHLORIDE AND DEXTROSE 1500 ML: 4.25; 538; 448; 18.3; 5.08 INJECTION, SOLUTION INTRAPERITONEAL at 03:30

## 2018-11-23 RX ADMIN — PREDNISONE 5 MG: 5 TABLET ORAL at 10:16

## 2018-11-23 RX ADMIN — IPRATROPIUM BROMIDE AND ALBUTEROL SULFATE 1 AMPULE: .5; 3 SOLUTION RESPIRATORY (INHALATION) at 20:36

## 2018-11-23 RX ADMIN — PROPOFOL 35 MCG/KG/MIN: 10 INJECTION, EMULSION INTRAVENOUS at 01:25

## 2018-11-23 RX ADMIN — SODIUM CHLORIDE TAB 1 GM 1 G: 1 TAB at 16:31

## 2018-11-23 RX ADMIN — Medication 1 UNITS: at 00:02

## 2018-11-23 RX ADMIN — Medication 1 UNITS: at 06:03

## 2018-11-23 RX ADMIN — SODIUM CHLORIDE, SODIUM LACTATE, CALCIUM CHLORIDE, MAGNESIUM CHLORIDE AND DEXTROSE 1500 ML: 4.25; 538; 448; 18.3; 5.08 INJECTION, SOLUTION INTRAPERITONEAL at 21:23

## 2018-11-23 ASSESSMENT — PULMONARY FUNCTION TESTS
PIF_VALUE: 22
PIF_VALUE: 26
PIF_VALUE: 22
PIF_VALUE: 26
PIF_VALUE: 26
PIF_VALUE: 27

## 2018-11-23 NOTE — PROGRESS NOTES
Renal Progress Note    Assessment and Plan:    1. ESRD on PD  2. Hypertension reasonably controlled   3. Respiratory failure   4. Nephroticsyndrome   5. 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 Hyponatremia due to sodium sieving from PD and use of 4.25 % solution as well  PLAN:  Reviewed labs   Reviewed medications   Add sodium chloride tablet 1000 mg po tid  Reviewed chest xray report and images   Reviewed PD data  Ultrafiltration remains good     Patient Active Problem List:     Accelerated hypertension     Cardiomegaly     Left ventricular systolic dysfunction     Cardiomyopathy ([facility] [address]     FSGS (focal segmental glomerulosclerosis)     HTN (hypertension)     CKD (chronic kidney disease) stage 5, GFR less than 15 ml/min (HCC)     Dyslipidemia     Nephrotic syndrome     Metabolic acidosis     History of renal transplant     Chronic renal allograft nephropathy     Acute respiratory distress     ESRD needing dialysis ([facility] [address]     Acute pulmonary edema (HCC)     Respiratory failure (HCC)     Anemia in chronic kidney disease, on chronic dialysis ([facility] [address]      Subjective:   Admit Date: 11/15/2018    Interval History:   Seeing for ESRD   Remains on vet   Updated by the nursing staff  No new issues   Good blood pressure       Medications:   Scheduled Meds:   sodium chloride  1 g Per NG tube TID WC    ipratropium-albuterol  1 ampule Inhalation Q6H WA    insulin lispro  0-6 Units Subcutaneous 4 times per day    dianeal lo-eligio 4.25%  1,500 mL Intraperitoneal Q6H    polyethylene glycol  17 g Oral Daily    predniSONE  5 mg Oral Daily    mycophenolate  1,000 mg Per NG tube BID    acyclovir  200 mg Oral Daily    docusate  100 mg Oral BID    piperacillin-tazobactam  2.25 g Intravenous Q12H    hydrALAZINE  100 mg Oral 3 times per day    amLODIPine  10 mg Oral Daily    darbepoetin edi-polysorbate  60 mcg Subcutaneous Weekly    chlorhexidine  15 mL Mouth/Throat BID    famotidine  20 mg Per NG tube Daily    carvedilol  25 mg Oral

## 2018-11-24 ENCOUNTER — APPOINTMENT (OUTPATIENT)
Dept: GENERAL RADIOLOGY | Age: 33
DRG: 207 | End: 2018-11-24
Payer: COMMERCIAL

## 2018-11-24 LAB
ANION GAP SERPL CALCULATED.3IONS-SCNC: 29 MEQ/L (ref 8–16)
APTT: 53.2 SECONDS (ref 22–38)
APTT: 65.2 SECONDS (ref 22–38)
APTT: 74 SECONDS (ref 22–38)
APTT: 76.4 SECONDS (ref 22–38)
BASOPHILS # BLD: 0.3 %
BASOPHILS ABSOLUTE: 0 THOU/MM3 (ref 0–0.1)
BUN BLDV-MCNC: 92 MG/DL (ref 7–22)
CALCIUM SERPL-MCNC: 8.8 MG/DL (ref 8.5–10.5)
CHLORIDE BLD-SCNC: 81 MEQ/L (ref 98–111)
CO2: 19 MEQ/L (ref 23–33)
CREAT SERPL-MCNC: 11.2 MG/DL (ref 0.4–1.2)
CYTOMEGALOVIRUS (CMV) CULTURE: NORMAL
EOSINOPHIL # BLD: 3.9 %
EOSINOPHILS ABSOLUTE: 0.5 THOU/MM3 (ref 0–0.4)
ERYTHROCYTE [DISTWIDTH] IN BLOOD BY AUTOMATED COUNT: 15.2 % (ref 11.5–14.5)
ERYTHROCYTE [DISTWIDTH] IN BLOOD BY AUTOMATED COUNT: 50.1 FL (ref 35–45)
GFR SERPL CREATININE-BSD FRML MDRD: 5 ML/MIN/1.73M2
GLUCOSE BLD-MCNC: 139 MG/DL (ref 70–108)
GLUCOSE BLD-MCNC: 148 MG/DL (ref 70–108)
GLUCOSE BLD-MCNC: 163 MG/DL (ref 70–108)
GLUCOSE BLD-MCNC: 188 MG/DL (ref 70–108)
GLUCOSE BLD-MCNC: 191 MG/DL (ref 70–108)
HCT VFR BLD CALC: 22.6 % (ref 42–52)
HEMOGLOBIN: 7.1 GM/DL (ref 14–18)
IMMATURE GRANS (ABS): 0.18 THOU/MM3 (ref 0–0.07)
IMMATURE GRANULOCYTES: 1.5 %
IRON SATURATION: 33 % (ref 20–50)
IRON: 70 UG/DL (ref 65–195)
LYMPHOCYTES # BLD: 4.7 %
LYMPHOCYTES ABSOLUTE: 0.5 THOU/MM3 (ref 1–4.8)
MAGNESIUM: 2.7 MG/DL (ref 1.6–2.4)
MCH RBC QN AUTO: 28.6 PG (ref 26–33)
MCHC RBC AUTO-ENTMCNC: 31.4 GM/DL (ref 32.2–35.5)
MCV RBC AUTO: 91.1 FL (ref 80–94)
MONOCYTES # BLD: 14.9 %
MONOCYTES ABSOLUTE: 1.7 THOU/MM3 (ref 0.4–1.3)
NUCLEATED RED BLOOD CELLS: 0 /100 WBC
PHOSPHORUS: 11.5 MG/DL (ref 2.4–4.7)
PLATELET # BLD: 206 THOU/MM3 (ref 130–400)
PLATELET # BLD: 212 THOU/MM3 (ref 130–400)
PMV BLD AUTO: 10.8 FL (ref 9.4–12.4)
POTASSIUM SERPL-SCNC: 4.3 MEQ/L (ref 3.5–5.2)
RBC # BLD: 2.48 MILL/MM3 (ref 4.7–6.1)
SEG NEUTROPHILS: 74.7 %
SEGMENTED NEUTROPHILS ABSOLUTE COUNT: 8.7 THOU/MM3 (ref 1.8–7.7)
SODIUM BLD-SCNC: 129 MEQ/L (ref 135–145)
TOTAL IRON BINDING CAPACITY: 215 UG/DL (ref 171–450)
WBC # BLD: 11.7 THOU/MM3 (ref 4.8–10.8)

## 2018-11-24 PROCEDURE — 2709999900 HC NON-CHARGEABLE SUPPLY

## 2018-11-24 PROCEDURE — 99233 SBSQ HOSP IP/OBS HIGH 50: CPT | Performed by: INTERNAL MEDICINE

## 2018-11-24 PROCEDURE — 6360000002 HC RX W HCPCS: Performed by: INTERNAL MEDICINE

## 2018-11-24 PROCEDURE — 71045 X-RAY EXAM CHEST 1 VIEW: CPT

## 2018-11-24 PROCEDURE — 83540 ASSAY OF IRON: CPT

## 2018-11-24 PROCEDURE — 2580000003 HC RX 258: Performed by: HOSPITALIST

## 2018-11-24 PROCEDURE — 99291 CRITICAL CARE FIRST HOUR: CPT | Performed by: INTERNAL MEDICINE

## 2018-11-24 PROCEDURE — 2500000003 HC RX 250 WO HCPCS: Performed by: HOSPITALIST

## 2018-11-24 PROCEDURE — 85025 COMPLETE CBC W/AUTO DIFF WBC: CPT

## 2018-11-24 PROCEDURE — 6370000000 HC RX 637 (ALT 250 FOR IP): Performed by: INTERNAL MEDICINE

## 2018-11-24 PROCEDURE — 2700000000 HC OXYGEN THERAPY PER DAY

## 2018-11-24 PROCEDURE — 2580000003 HC RX 258: Performed by: INTERNAL MEDICINE

## 2018-11-24 PROCEDURE — 2000000000 HC ICU R&B

## 2018-11-24 PROCEDURE — 85049 AUTOMATED PLATELET COUNT: CPT

## 2018-11-24 PROCEDURE — 84100 ASSAY OF PHOSPHORUS: CPT

## 2018-11-24 PROCEDURE — 94003 VENT MGMT INPAT SUBQ DAY: CPT

## 2018-11-24 PROCEDURE — 36415 COLL VENOUS BLD VENIPUNCTURE: CPT

## 2018-11-24 PROCEDURE — 94640 AIRWAY INHALATION TREATMENT: CPT

## 2018-11-24 PROCEDURE — 83735 ASSAY OF MAGNESIUM: CPT

## 2018-11-24 PROCEDURE — 83550 IRON BINDING TEST: CPT

## 2018-11-24 PROCEDURE — 85730 THROMBOPLASTIN TIME PARTIAL: CPT

## 2018-11-24 PROCEDURE — 36591 DRAW BLOOD OFF VENOUS DEVICE: CPT

## 2018-11-24 PROCEDURE — 80048 BASIC METABOLIC PNL TOTAL CA: CPT

## 2018-11-24 PROCEDURE — 82948 REAGENT STRIP/BLOOD GLUCOSE: CPT

## 2018-11-24 RX ADMIN — PROPOFOL 50 MCG/KG/MIN: 10 INJECTION, EMULSION INTRAVENOUS at 08:10

## 2018-11-24 RX ADMIN — SODIUM CHLORIDE, SODIUM LACTATE, CALCIUM CHLORIDE, MAGNESIUM CHLORIDE AND DEXTROSE 1500 ML: 4.25; 538; 448; 18.3; 5.08 INJECTION, SOLUTION INTRAPERITONEAL at 15:49

## 2018-11-24 RX ADMIN — DARBEPOETIN ALFA 60 MCG: 60 INJECTION, SOLUTION INTRAVENOUS; SUBCUTANEOUS at 09:49

## 2018-11-24 RX ADMIN — Medication 15 ML: at 09:31

## 2018-11-24 RX ADMIN — SODIUM CHLORIDE, SODIUM LACTATE, CALCIUM CHLORIDE, MAGNESIUM CHLORIDE AND DEXTROSE 1500 ML: 4.25; 538; 448; 18.3; 5.08 INJECTION, SOLUTION INTRAPERITONEAL at 03:30

## 2018-11-24 RX ADMIN — SODIUM CHLORIDE, SODIUM LACTATE, CALCIUM CHLORIDE, MAGNESIUM CHLORIDE AND DEXTROSE 1500 ML: 4.25; 538; 448; 18.3; 5.08 INJECTION, SOLUTION INTRAPERITONEAL at 21:21

## 2018-11-24 RX ADMIN — CARVEDILOL 25 MG: 25 TABLET, FILM COATED ORAL at 09:22

## 2018-11-24 RX ADMIN — HEPARIN SODIUM 27 UNITS/KG/HR: 10000 INJECTION, SOLUTION INTRAVENOUS at 04:59

## 2018-11-24 RX ADMIN — PIPERACILLIN SODIUM,TAZOBACTAM SODIUM 2.25 G: 2; .25 INJECTION, POWDER, FOR SOLUTION INTRAVENOUS at 09:14

## 2018-11-24 RX ADMIN — IPRATROPIUM BROMIDE AND ALBUTEROL SULFATE 1 AMPULE: .5; 3 SOLUTION RESPIRATORY (INHALATION) at 07:55

## 2018-11-24 RX ADMIN — MYCOPHENOLATE MOFETIL 1000 MG: 200 POWDER, FOR SUSPENSION ORAL at 09:22

## 2018-11-24 RX ADMIN — PROPOFOL 25 MCG/KG/MIN: 10 INJECTION, EMULSION INTRAVENOUS at 17:49

## 2018-11-24 RX ADMIN — Medication 1 UNITS: at 13:35

## 2018-11-24 RX ADMIN — PROPOFOL 25 MCG/KG/MIN: 10 INJECTION, EMULSION INTRAVENOUS at 23:34

## 2018-11-24 RX ADMIN — POLYETHYLENE GLYCOL 3350 17 G: 17 POWDER, FOR SOLUTION ORAL at 09:16

## 2018-11-24 RX ADMIN — DOCUSATE SODIUM 100 MG: 50 LIQUID ORAL at 09:17

## 2018-11-24 RX ADMIN — DOCUSATE SODIUM 100 MG: 50 LIQUID ORAL at 21:20

## 2018-11-24 RX ADMIN — SODIUM CHLORIDE, SODIUM LACTATE, CALCIUM CHLORIDE, MAGNESIUM CHLORIDE AND DEXTROSE 1500 ML: 4.25; 538; 448; 18.3; 5.08 INJECTION, SOLUTION INTRAPERITONEAL at 09:14

## 2018-11-24 RX ADMIN — FAMOTIDINE 20 MG: 20 TABLET ORAL at 09:17

## 2018-11-24 RX ADMIN — ACYCLOVIR 200 MG: 200 CAPSULE ORAL at 09:23

## 2018-11-24 RX ADMIN — CARVEDILOL 25 MG: 25 TABLET, FILM COATED ORAL at 16:39

## 2018-11-24 RX ADMIN — AMLODIPINE BESYLATE 10 MG: 10 TABLET ORAL at 09:23

## 2018-11-24 RX ADMIN — DAPSONE 100 MG: 25 TABLET ORAL at 09:23

## 2018-11-24 RX ADMIN — PREDNISONE 5 MG: 5 TABLET ORAL at 09:23

## 2018-11-24 RX ADMIN — DEXMEDETOMIDINE 0.4 MCG/KG/HR: 100 INJECTION, SOLUTION, CONCENTRATE INTRAVENOUS at 15:42

## 2018-11-24 RX ADMIN — IPRATROPIUM BROMIDE AND ALBUTEROL SULFATE 1 AMPULE: .5; 3 SOLUTION RESPIRATORY (INHALATION) at 12:20

## 2018-11-24 RX ADMIN — PROPOFOL 50 MCG/KG/MIN: 10 INJECTION, EMULSION INTRAVENOUS at 01:21

## 2018-11-24 RX ADMIN — HYDRALAZINE HYDROCHLORIDE 100 MG: 50 TABLET, FILM COATED ORAL at 21:20

## 2018-11-24 RX ADMIN — Medication 1 UNITS: at 06:31

## 2018-11-24 RX ADMIN — SODIUM CHLORIDE TAB 1 GM 1 G: 1 TAB at 13:23

## 2018-11-24 RX ADMIN — Medication 15 ML: at 21:20

## 2018-11-24 RX ADMIN — HYDRALAZINE HYDROCHLORIDE 100 MG: 50 TABLET, FILM COATED ORAL at 13:23

## 2018-11-24 RX ADMIN — MYCOPHENOLATE MOFETIL 1000 MG: 200 POWDER, FOR SUSPENSION ORAL at 21:20

## 2018-11-24 RX ADMIN — PIPERACILLIN SODIUM,TAZOBACTAM SODIUM 2.25 G: 2; .25 INJECTION, POWDER, FOR SOLUTION INTRAVENOUS at 21:20

## 2018-11-24 RX ADMIN — IPRATROPIUM BROMIDE AND ALBUTEROL SULFATE 1 AMPULE: .5; 3 SOLUTION RESPIRATORY (INHALATION) at 20:59

## 2018-11-24 RX ADMIN — HEPARIN SODIUM 27 UNITS/KG/HR: 10000 INJECTION, SOLUTION INTRAVENOUS at 15:42

## 2018-11-24 RX ADMIN — Medication 1 UNITS: at 18:19

## 2018-11-24 RX ADMIN — SODIUM CHLORIDE TAB 1 GM 1 G: 1 TAB at 16:39

## 2018-11-24 RX ADMIN — SODIUM CHLORIDE TAB 1 GM 1 G: 1 TAB at 09:23

## 2018-11-24 ASSESSMENT — PULMONARY FUNCTION TESTS
PIF_VALUE: 18
PIF_VALUE: 23
PIF_VALUE: 18
PIF_VALUE: 22
PIF_VALUE: 23
PIF_VALUE: 23
PIF_VALUE: 17

## 2018-11-24 NOTE — PROGRESS NOTES
Intake             7670 ml   Output             8540 ml   Net             -870 ml       Constitutional:  On vent   Skin:normal   HEENT:Pupils are reactive with ET tube,OG tube noted   Neck:supple with no thyromegally  Cardiovascular:  S1, S2 without murmur  Respiratory:  Clear  Abdomen: +bs, soft, intact PD catheter noted   Ext: No LE edema  Musculoskeletal:Intact  Neuro:Remains on vent       Electronically signed by Darwin Hdez DO on 11/24/2018 at 11:05 AM

## 2018-11-24 NOTE — PROGRESS NOTES
HCT  24.8*  24.7*   --   22.6*   MCV  90.5  91.5   --   91.1   MCH  28.1  28.1   --   28.6   MCHC  31.0*  30.8*   --   31.4*   PLT  192  189  206  212   MPV  11.1  11.3   --   10.8      BMP/CMP:   Recent Labs      11/22/18 0530  11/23/18 0535  11/24/18 0535   NA  130*  126*  129*   K  4.4  4.4  4.3   CL  84*  80*  81*   CO2  20*  21*  19*   ANIONGAP  26.0*  25.0*  29.0*   BUN  81*  86*  92*   CREATININE  10.0*  10.5*  11.2*   GLUCOSE  129*  133*  163*   CALCIUM  9.1  8.9  8.8      Other Electrolytes:   Recent Labs      11/24/18 0535   PHOS  11.5*   MG  2.7*     Serum Osmolality  No results for input(s): OSMOMEASER in the last 72 hours. Procalcitonin:  No results for input(s): PROCAL in the last 72 hours. Cardiac: No results for input(s): TROPONINT in the last 72 hours. Lipids: No results for input(s): CHOL, HDL in the last 72 hours. Invalid input(s): LDLCALCU  Coagulation:   Recent Labs      11/22/18 0530   INR  0.97     Lactic Acid: No results for input(s): LACTA in the last 72 hours. ABGs:   Lab Results   Component Value Date    PH 7.40 11/19/2018    PCO2 44 11/19/2018    PO2 131 11/19/2018    HCO3 27 11/19/2018    O2SAT 99 11/19/2018     Lab Results   Component Value Date    IFIO2 100 11/19/2018    MODE PC/PS 11/19/2018    SETPEEP 12.0 11/19/2018       Radiology/Imaging:  XR CHEST PORTABLE [[id number]] Resulted: 11/24/18 0516      Order Status: Completed Updated: 11/24/18 0518     Narrative:       PROCEDURE: XR CHEST PORTABLE    CLINICAL INFORMATION: hypoxia, . COMPARISON: December 23, 2018    TECHNIQUE: AP upright view of the chest.    FINDINGS:  Lung volumes remain diminished with persistent patchy atelectasis the left lower lobe retrocardiac region. Endotracheal and nasogastric tube positions are stable. There has been interval removal of the subclavian central line from the right without   pneumothorax. There is no change of the skeleton.     Impression:       1.  Status post central line on the right side. There is a small right-sided pleural effusion. Superimposed bibasilar infiltrates can't be excluded. No pneumothorax. A nasogastric tube is seen coursing through the esophagus terminating in the stomach. A large amount of ascites is seen in the visualized portion of the abdomen. Osseous structures are intact.     Impression:       1. Small right-sided pleural effusion. There is atelectasis at both lung bases, more so on the right side. Superimposed infiltrates can't be excluded. 2. Cardiomegaly with a small pericardial effusion. 3. Large amount of ascites is seen in the visualized portions of the upper abdomen. **This report has been created using voice recognition software. It may contain minor errors which are inherent in voice recognition technology. **    Final report electronically signed by Dr Heraclio Tello on 11/19/2018 3:23 PM     XR CHEST PORTABLE [[id number]] Resulted: 11/19/18 0822     Order Status: Completed Updated: 11/19/18 0824     Narrative:       PROCEDURE: XR CHEST PORTABLE    CLINICAL INFORMATION: mechanical ventilation, line placement, .  History of cardiomyopathy and dyslipidemia. Kidney disease. Kidney transplant. COMPARISON: Chest x-ray 11/18/2018. TECHNIQUE: AP semiupright view of the chest.    FINDINGS:  The endotracheal tube tip is 3 cm above the carlee. A right-sided dialysis catheter tip is in the right atrium. There is an esophageal route tube in place. The tip is in the stomach. There is a left-sided subclavian central venous line. The tip is directed superiorly over the midline. This is unchanged. The heart size is normal.  The mediastinum is not widened. Manuel Bullocks is a persistent hazy appearance to the right mid and lower lung zone. The left lung is relatively clear. The pulmonary vascularity is normal.  No suspicious osseous lesions are present.     Impression:       1. Persistent hazy appearance to the right mid and lower lung zone.   2.

## 2018-11-24 NOTE — PROGRESS NOTES
0930- CAPD exchange started, no complications, patient resting comfortably on ventilator. 0000- Patient restless and uncomfortable. Repositioned and bathed with no improvement. Sedation titrated to provide rest for patient.

## 2018-11-25 ENCOUNTER — APPOINTMENT (OUTPATIENT)
Dept: GENERAL RADIOLOGY | Age: 33
DRG: 207 | End: 2018-11-25
Payer: COMMERCIAL

## 2018-11-25 LAB
ANION GAP SERPL CALCULATED.3IONS-SCNC: 27 MEQ/L (ref 8–16)
APTT: 72.9 SECONDS (ref 22–38)
APTT: 86.2 SECONDS (ref 22–38)
BASE EXCESS (CALCULATED): -2.6 MMOL/L (ref -2.5–2.5)
BASOPHILS # BLD: 0.2 %
BASOPHILS ABSOLUTE: 0 THOU/MM3 (ref 0–0.1)
BUN BLDV-MCNC: 92 MG/DL (ref 7–22)
CALCIUM SERPL-MCNC: 9.1 MG/DL (ref 8.5–10.5)
CHLORIDE BLD-SCNC: 82 MEQ/L (ref 98–111)
CO2: 18 MEQ/L (ref 23–33)
COLLECTED BY:: ABNORMAL
CREAT SERPL-MCNC: 11.7 MG/DL (ref 0.4–1.2)
DEVICE: ABNORMAL
EOSINOPHIL # BLD: 4.4 %
EOSINOPHILS ABSOLUTE: 0.4 THOU/MM3 (ref 0–0.4)
ERYTHROCYTE [DISTWIDTH] IN BLOOD BY AUTOMATED COUNT: 15.2 % (ref 11.5–14.5)
ERYTHROCYTE [DISTWIDTH] IN BLOOD BY AUTOMATED COUNT: 50.5 FL (ref 35–45)
GFR SERPL CREATININE-BSD FRML MDRD: 5 ML/MIN/1.73M2
GLUCOSE BLD-MCNC: 125 MG/DL (ref 70–108)
GLUCOSE BLD-MCNC: 132 MG/DL (ref 70–108)
GLUCOSE BLD-MCNC: 158 MG/DL (ref 70–108)
GLUCOSE BLD-MCNC: 167 MG/DL (ref 70–108)
HCO3: 22 MMOL/L (ref 23–28)
HCT VFR BLD CALC: 22.7 % (ref 42–52)
HEMOGLOBIN: 7.2 GM/DL (ref 14–18)
IFIO2: 65
IMMATURE GRANS (ABS): 0.23 THOU/MM3 (ref 0–0.07)
IMMATURE GRANULOCYTES: 2.3 %
LEGIONELLA SPECIES CULTURE: NORMAL
LYMPHOCYTES # BLD: 7.8 %
LYMPHOCYTES ABSOLUTE: 0.8 THOU/MM3 (ref 1–4.8)
MCH RBC QN AUTO: 29.1 PG (ref 26–33)
MCHC RBC AUTO-ENTMCNC: 31.7 GM/DL (ref 32.2–35.5)
MCV RBC AUTO: 91.9 FL (ref 80–94)
MODE: ABNORMAL
MONOCYTES # BLD: 13.5 %
MONOCYTES ABSOLUTE: 1.4 THOU/MM3 (ref 0.4–1.3)
NUCLEATED RED BLOOD CELLS: 0 /100 WBC
O2 SATURATION: 99 %
PCO2: 34 MMHG (ref 35–45)
PH BLOOD GAS: 7.41 (ref 7.35–7.45)
PLATELET # BLD: 225 THOU/MM3 (ref 130–400)
PMV BLD AUTO: 10.6 FL (ref 9.4–12.4)
PO2: 154 MMHG (ref 71–104)
POTASSIUM SERPL-SCNC: 4.1 MEQ/L (ref 3.5–5.2)
RBC # BLD: 2.47 MILL/MM3 (ref 4.7–6.1)
SEG NEUTROPHILS: 71.8 %
SEGMENTED NEUTROPHILS ABSOLUTE COUNT: 7.2 THOU/MM3 (ref 1.8–7.7)
SET PEEP: 8 MMHG
SET PRESS SUPP: 12 CMH2O
SET RESPIRATORY RATE: 12 BPM
SODIUM BLD-SCNC: 127 MEQ/L (ref 135–145)
SOURCE, BLOOD GAS: ABNORMAL
WBC # BLD: 10 THOU/MM3 (ref 4.8–10.8)

## 2018-11-25 PROCEDURE — 36415 COLL VENOUS BLD VENIPUNCTURE: CPT

## 2018-11-25 PROCEDURE — 2500000003 HC RX 250 WO HCPCS: Performed by: HOSPITALIST

## 2018-11-25 PROCEDURE — 2709999900 HC NON-CHARGEABLE SUPPLY

## 2018-11-25 PROCEDURE — 85730 THROMBOPLASTIN TIME PARTIAL: CPT

## 2018-11-25 PROCEDURE — 82948 REAGENT STRIP/BLOOD GLUCOSE: CPT

## 2018-11-25 PROCEDURE — 6370000000 HC RX 637 (ALT 250 FOR IP): Performed by: INTERNAL MEDICINE

## 2018-11-25 PROCEDURE — 99233 SBSQ HOSP IP/OBS HIGH 50: CPT | Performed by: INTERNAL MEDICINE

## 2018-11-25 PROCEDURE — 99291 CRITICAL CARE FIRST HOUR: CPT | Performed by: INTERNAL MEDICINE

## 2018-11-25 PROCEDURE — 85025 COMPLETE CBC W/AUTO DIFF WBC: CPT

## 2018-11-25 PROCEDURE — 80048 BASIC METABOLIC PNL TOTAL CA: CPT

## 2018-11-25 PROCEDURE — 2000000000 HC ICU R&B

## 2018-11-25 PROCEDURE — 71045 X-RAY EXAM CHEST 1 VIEW: CPT

## 2018-11-25 PROCEDURE — 6360000002 HC RX W HCPCS: Performed by: INTERNAL MEDICINE

## 2018-11-25 PROCEDURE — 2580000003 HC RX 258: Performed by: INTERNAL MEDICINE

## 2018-11-25 PROCEDURE — 94003 VENT MGMT INPAT SUBQ DAY: CPT

## 2018-11-25 PROCEDURE — 2580000003 HC RX 258: Performed by: HOSPITALIST

## 2018-11-25 PROCEDURE — 94660 CPAP INITIATION&MGMT: CPT

## 2018-11-25 PROCEDURE — 94640 AIRWAY INHALATION TREATMENT: CPT

## 2018-11-25 PROCEDURE — 82803 BLOOD GASES ANY COMBINATION: CPT

## 2018-11-25 RX ORDER — MYCOPHENOLATE MOFETIL 250 MG/1
1000 CAPSULE ORAL 2 TIMES DAILY
Status: DISCONTINUED | OUTPATIENT
Start: 2018-11-26 | End: 2018-11-28 | Stop reason: HOSPADM

## 2018-11-25 RX ADMIN — DAPSONE 100 MG: 25 TABLET ORAL at 08:20

## 2018-11-25 RX ADMIN — SODIUM CHLORIDE TAB 1 GM 1 G: 1 TAB at 16:30

## 2018-11-25 RX ADMIN — IPRATROPIUM BROMIDE AND ALBUTEROL SULFATE 1 AMPULE: .5; 3 SOLUTION RESPIRATORY (INHALATION) at 20:47

## 2018-11-25 RX ADMIN — SODIUM CHLORIDE, SODIUM LACTATE, CALCIUM CHLORIDE, MAGNESIUM CHLORIDE AND DEXTROSE 1500 ML: 4.25; 538; 448; 18.3; 5.08 INJECTION, SOLUTION INTRAPERITONEAL at 03:31

## 2018-11-25 RX ADMIN — CARVEDILOL 25 MG: 25 TABLET, FILM COATED ORAL at 08:19

## 2018-11-25 RX ADMIN — HYDRALAZINE HYDROCHLORIDE 100 MG: 50 TABLET, FILM COATED ORAL at 21:46

## 2018-11-25 RX ADMIN — SODIUM CHLORIDE, SODIUM LACTATE, CALCIUM CHLORIDE, MAGNESIUM CHLORIDE AND DEXTROSE 1500 ML: 4.25; 538; 448; 18.3; 5.08 INJECTION, SOLUTION INTRAPERITONEAL at 21:05

## 2018-11-25 RX ADMIN — MYCOPHENOLATE MOFETIL 1000 MG: 200 POWDER, FOR SUSPENSION ORAL at 20:58

## 2018-11-25 RX ADMIN — HYDRALAZINE HYDROCHLORIDE 100 MG: 50 TABLET, FILM COATED ORAL at 16:30

## 2018-11-25 RX ADMIN — DEXMEDETOMIDINE 0.4 MCG/KG/HR: 100 INJECTION, SOLUTION, CONCENTRATE INTRAVENOUS at 04:40

## 2018-11-25 RX ADMIN — SODIUM CHLORIDE, SODIUM LACTATE, CALCIUM CHLORIDE, MAGNESIUM CHLORIDE AND DEXTROSE 1500 ML: 4.25; 538; 448; 18.3; 5.08 INJECTION, SOLUTION INTRAPERITONEAL at 16:34

## 2018-11-25 RX ADMIN — Medication 15 ML: at 21:09

## 2018-11-25 RX ADMIN — PIPERACILLIN SODIUM,TAZOBACTAM SODIUM 2.25 G: 2; .25 INJECTION, POWDER, FOR SOLUTION INTRAVENOUS at 09:31

## 2018-11-25 RX ADMIN — PREDNISONE 5 MG: 5 TABLET ORAL at 08:19

## 2018-11-25 RX ADMIN — Medication 15 ML: at 08:26

## 2018-11-25 RX ADMIN — CARVEDILOL 25 MG: 25 TABLET, FILM COATED ORAL at 16:30

## 2018-11-25 RX ADMIN — HYDRALAZINE HYDROCHLORIDE 100 MG: 50 TABLET, FILM COATED ORAL at 08:19

## 2018-11-25 RX ADMIN — Medication 1 UNITS: at 17:10

## 2018-11-25 RX ADMIN — ACYCLOVIR 200 MG: 200 CAPSULE ORAL at 08:19

## 2018-11-25 RX ADMIN — AMLODIPINE BESYLATE 10 MG: 10 TABLET ORAL at 08:19

## 2018-11-25 RX ADMIN — SODIUM CHLORIDE TAB 1 GM 1 G: 1 TAB at 08:19

## 2018-11-25 RX ADMIN — HEPARIN SODIUM 27 UNITS/HR: 10000 INJECTION, SOLUTION INTRAVENOUS at 02:23

## 2018-11-25 RX ADMIN — PIPERACILLIN SODIUM,TAZOBACTAM SODIUM 2.25 G: 2; .25 INJECTION, POWDER, FOR SOLUTION INTRAVENOUS at 20:36

## 2018-11-25 RX ADMIN — SODIUM CHLORIDE, SODIUM LACTATE, CALCIUM CHLORIDE, MAGNESIUM CHLORIDE AND DEXTROSE 1500 ML: 4.25; 538; 448; 18.3; 5.08 INJECTION, SOLUTION INTRAPERITONEAL at 09:27

## 2018-11-25 RX ADMIN — HEPARIN SODIUM 27 UNITS/KG/HR: 10000 INJECTION, SOLUTION INTRAVENOUS at 13:28

## 2018-11-25 RX ADMIN — IPRATROPIUM BROMIDE AND ALBUTEROL SULFATE 1 AMPULE: .5; 3 SOLUTION RESPIRATORY (INHALATION) at 14:31

## 2018-11-25 RX ADMIN — FAMOTIDINE 20 MG: 20 TABLET ORAL at 08:19

## 2018-11-25 RX ADMIN — IPRATROPIUM BROMIDE AND ALBUTEROL SULFATE 1 AMPULE: .5; 3 SOLUTION RESPIRATORY (INHALATION) at 08:09

## 2018-11-25 RX ADMIN — Medication 1 UNITS: at 12:20

## 2018-11-25 RX ADMIN — MYCOPHENOLATE MOFETIL 1000 MG: 200 POWDER, FOR SUSPENSION ORAL at 10:23

## 2018-11-25 ASSESSMENT — PULMONARY FUNCTION TESTS
PIF_VALUE: 12
PIF_VALUE: 22
PIF_VALUE: 21
PIF_VALUE: 20
PIF_VALUE: 19
PIF_VALUE: 24

## 2018-11-25 ASSESSMENT — PAIN SCALES - GENERAL
PAINLEVEL_OUTOF10: 0
PAINLEVEL_OUTOF10: 0

## 2018-11-25 NOTE — PROGRESS NOTES
1030--extubated pt  1035-placed pt on bipap, 10/5 at 45%           Large ffm            no problem noted

## 2018-11-26 LAB
ALBUMIN SERPL-MCNC: 4 G/DL (ref 3.5–5.1)
ANION GAP SERPL CALCULATED.3IONS-SCNC: 31 MEQ/L (ref 8–16)
ANISOCYTOSIS: PRESENT
APTT: 74.9 SECONDS (ref 22–38)
BASOPHILIA: ABNORMAL
BASOPHILS # BLD: 0.5 %
BASOPHILS ABSOLUTE: 0.1 THOU/MM3 (ref 0–0.1)
BUN BLDV-MCNC: 94 MG/DL (ref 7–22)
CALCIUM SERPL-MCNC: 10.1 MG/DL (ref 8.5–10.5)
CHLORIDE BLD-SCNC: 80 MEQ/L (ref 98–111)
CO2: 19 MEQ/L (ref 23–33)
CREAT SERPL-MCNC: 11.7 MG/DL (ref 0.4–1.2)
EOSINOPHIL # BLD: 3.1 %
EOSINOPHILS ABSOLUTE: 0.6 THOU/MM3 (ref 0–0.4)
ERYTHROCYTE [DISTWIDTH] IN BLOOD BY AUTOMATED COUNT: 15.7 % (ref 11.5–14.5)
ERYTHROCYTE [DISTWIDTH] IN BLOOD BY AUTOMATED COUNT: 51.7 FL (ref 35–45)
GFR SERPL CREATININE-BSD FRML MDRD: 5 ML/MIN/1.73M2
GLUCOSE BLD-MCNC: 119 MG/DL (ref 70–108)
GLUCOSE BLD-MCNC: 128 MG/DL (ref 70–108)
GLUCOSE BLD-MCNC: 133 MG/DL (ref 70–108)
GLUCOSE BLD-MCNC: 134 MG/DL (ref 70–108)
GLUCOSE BLD-MCNC: 153 MG/DL (ref 70–108)
GLUCOSE BLD-MCNC: 155 MG/DL (ref 70–108)
HCT VFR BLD CALC: 30.4 % (ref 42–52)
HEMOGLOBIN: 9.4 GM/DL (ref 14–18)
IMMATURE GRANS (ABS): 0.66 THOU/MM3 (ref 0–0.07)
IMMATURE GRANULOCYTES: 3.5 %
LYMPHOCYTES # BLD: 4.5 %
LYMPHOCYTES ABSOLUTE: 0.8 THOU/MM3 (ref 1–4.8)
MCH RBC QN AUTO: 28.5 PG (ref 26–33)
MCHC RBC AUTO-ENTMCNC: 30.9 GM/DL (ref 32.2–35.5)
MCV RBC AUTO: 92.1 FL (ref 80–94)
MONOCYTES # BLD: 10.9 %
MONOCYTES ABSOLUTE: 2 THOU/MM3 (ref 0.4–1.3)
NUCLEATED RED BLOOD CELLS: 0 /100 WBC
PATHOLOGIST REVIEW: ABNORMAL
PLATELET # BLD: 351 THOU/MM3 (ref 130–400)
PLATELET ESTIMATE: ADEQUATE
PMV BLD AUTO: 10.1 FL (ref 9.4–12.4)
POTASSIUM SERPL-SCNC: 3.4 MEQ/L (ref 3.5–5.2)
RBC # BLD: 3.3 MILL/MM3 (ref 4.7–6.1)
SCAN OF BLOOD SMEAR: NORMAL
SEG NEUTROPHILS: 77.5 %
SEGMENTED NEUTROPHILS ABSOLUTE COUNT: 14.6 THOU/MM3 (ref 1.8–7.7)
SODIUM BLD-SCNC: 130 MEQ/L (ref 135–145)
SPHEROCYTES: ABNORMAL
WBC # BLD: 18.8 THOU/MM3 (ref 4.8–10.8)

## 2018-11-26 PROCEDURE — 6360000002 HC RX W HCPCS: Performed by: INTERNAL MEDICINE

## 2018-11-26 PROCEDURE — 90945 DIALYSIS ONE EVALUATION: CPT | Performed by: INTERNAL MEDICINE

## 2018-11-26 PROCEDURE — 82948 REAGENT STRIP/BLOOD GLUCOSE: CPT

## 2018-11-26 PROCEDURE — 82040 ASSAY OF SERUM ALBUMIN: CPT

## 2018-11-26 PROCEDURE — 94640 AIRWAY INHALATION TREATMENT: CPT

## 2018-11-26 PROCEDURE — 85730 THROMBOPLASTIN TIME PARTIAL: CPT

## 2018-11-26 PROCEDURE — 99233 SBSQ HOSP IP/OBS HIGH 50: CPT | Performed by: INTERNAL MEDICINE

## 2018-11-26 PROCEDURE — 6370000000 HC RX 637 (ALT 250 FOR IP): Performed by: NURSE PRACTITIONER

## 2018-11-26 PROCEDURE — G8978 MOBILITY CURRENT STATUS: HCPCS

## 2018-11-26 PROCEDURE — 6370000000 HC RX 637 (ALT 250 FOR IP): Performed by: INTERNAL MEDICINE

## 2018-11-26 PROCEDURE — 92610 EVALUATE SWALLOWING FUNCTION: CPT

## 2018-11-26 PROCEDURE — 85025 COMPLETE CBC W/AUTO DIFF WBC: CPT

## 2018-11-26 PROCEDURE — 90945 DIALYSIS ONE EVALUATION: CPT

## 2018-11-26 PROCEDURE — 2580000003 HC RX 258: Performed by: INTERNAL MEDICINE

## 2018-11-26 PROCEDURE — G8988 SELF CARE GOAL STATUS: HCPCS

## 2018-11-26 PROCEDURE — 97162 PT EVAL MOD COMPLEX 30 MIN: CPT

## 2018-11-26 PROCEDURE — 97530 THERAPEUTIC ACTIVITIES: CPT

## 2018-11-26 PROCEDURE — 36415 COLL VENOUS BLD VENIPUNCTURE: CPT

## 2018-11-26 PROCEDURE — 1200000003 HC TELEMETRY R&B

## 2018-11-26 PROCEDURE — 2000000000 HC ICU R&B

## 2018-11-26 PROCEDURE — 80048 BASIC METABOLIC PNL TOTAL CA: CPT

## 2018-11-26 PROCEDURE — G8987 SELF CARE CURRENT STATUS: HCPCS

## 2018-11-26 PROCEDURE — 97166 OT EVAL MOD COMPLEX 45 MIN: CPT

## 2018-11-26 PROCEDURE — G8979 MOBILITY GOAL STATUS: HCPCS

## 2018-11-26 PROCEDURE — 2709999900 HC NON-CHARGEABLE SUPPLY

## 2018-11-26 RX ORDER — DOCUSATE SODIUM 100 MG/1
100 CAPSULE, LIQUID FILLED ORAL 2 TIMES DAILY
Status: DISCONTINUED | OUTPATIENT
Start: 2018-11-26 | End: 2018-11-28 | Stop reason: HOSPADM

## 2018-11-26 RX ORDER — IPRATROPIUM BROMIDE AND ALBUTEROL SULFATE 2.5; .5 MG/3ML; MG/3ML
1 SOLUTION RESPIRATORY (INHALATION) EVERY 4 HOURS PRN
Status: DISCONTINUED | OUTPATIENT
Start: 2018-11-26 | End: 2018-11-28 | Stop reason: HOSPADM

## 2018-11-26 RX ORDER — SODIUM CHLORIDE 1000 MG
1 TABLET, SOLUBLE MISCELLANEOUS
Status: DISCONTINUED | OUTPATIENT
Start: 2018-11-27 | End: 2018-11-28

## 2018-11-26 RX ORDER — POTASSIUM CHLORIDE 20 MEQ/1
40 TABLET, EXTENDED RELEASE ORAL ONCE
Status: COMPLETED | OUTPATIENT
Start: 2018-11-26 | End: 2018-11-26

## 2018-11-26 RX ADMIN — Medication 1 UNITS: at 23:55

## 2018-11-26 RX ADMIN — PIPERACILLIN SODIUM,TAZOBACTAM SODIUM 2.25 G: 2; .25 INJECTION, POWDER, FOR SOLUTION INTRAVENOUS at 08:40

## 2018-11-26 RX ADMIN — HYDRALAZINE HYDROCHLORIDE 100 MG: 50 TABLET, FILM COATED ORAL at 06:16

## 2018-11-26 RX ADMIN — CARVEDILOL 25 MG: 25 TABLET, FILM COATED ORAL at 08:12

## 2018-11-26 RX ADMIN — APIXABAN 2.5 MG: 2.5 TABLET, FILM COATED ORAL at 20:34

## 2018-11-26 RX ADMIN — PREDNISONE 5 MG: 5 TABLET ORAL at 08:12

## 2018-11-26 RX ADMIN — SODIUM CHLORIDE, SODIUM LACTATE, CALCIUM CHLORIDE, MAGNESIUM CHLORIDE AND DEXTROSE 1500 ML: 4.25; 538; 448; 18.3; 5.08 INJECTION, SOLUTION INTRAPERITONEAL at 22:20

## 2018-11-26 RX ADMIN — HYDRALAZINE HYDROCHLORIDE 100 MG: 50 TABLET, FILM COATED ORAL at 13:38

## 2018-11-26 RX ADMIN — SODIUM CHLORIDE TAB 1 GM 1 G: 1 TAB at 12:42

## 2018-11-26 RX ADMIN — SODIUM CHLORIDE, SODIUM LACTATE, CALCIUM CHLORIDE, MAGNESIUM CHLORIDE AND DEXTROSE 1500 ML: 4.25; 538; 448; 18.3; 5.08 INJECTION, SOLUTION INTRAPERITONEAL at 16:53

## 2018-11-26 RX ADMIN — DAPSONE 100 MG: 25 TABLET ORAL at 08:12

## 2018-11-26 RX ADMIN — IPRATROPIUM BROMIDE AND ALBUTEROL SULFATE 1 AMPULE: .5; 3 SOLUTION RESPIRATORY (INHALATION) at 13:24

## 2018-11-26 RX ADMIN — AMLODIPINE BESYLATE 10 MG: 10 TABLET ORAL at 08:12

## 2018-11-26 RX ADMIN — HEPARIN SODIUM 27 UNITS/KG/HR: 10000 INJECTION, SOLUTION INTRAVENOUS at 01:17

## 2018-11-26 RX ADMIN — CARVEDILOL 25 MG: 25 TABLET, FILM COATED ORAL at 17:00

## 2018-11-26 RX ADMIN — FAMOTIDINE 20 MG: 20 TABLET ORAL at 08:12

## 2018-11-26 RX ADMIN — DOCUSATE SODIUM 100 MG: 100 CAPSULE, LIQUID FILLED ORAL at 08:21

## 2018-11-26 RX ADMIN — SODIUM CHLORIDE, SODIUM LACTATE, CALCIUM CHLORIDE, MAGNESIUM CHLORIDE AND DEXTROSE 1500 ML: 4.25; 538; 448; 18.3; 5.08 INJECTION, SOLUTION INTRAPERITONEAL at 08:06

## 2018-11-26 RX ADMIN — Medication 15 ML: at 08:21

## 2018-11-26 RX ADMIN — IPRATROPIUM BROMIDE AND ALBUTEROL SULFATE 1 AMPULE: .5; 3 SOLUTION RESPIRATORY (INHALATION) at 09:10

## 2018-11-26 RX ADMIN — HEPARIN SODIUM 27 UNITS/KG/HR: 10000 INJECTION, SOLUTION INTRAVENOUS at 12:36

## 2018-11-26 RX ADMIN — SODIUM CHLORIDE, SODIUM LACTATE, CALCIUM CHLORIDE, MAGNESIUM CHLORIDE AND DEXTROSE 1500 ML: 4.25; 538; 448; 18.3; 5.08 INJECTION, SOLUTION INTRAPERITONEAL at 03:13

## 2018-11-26 RX ADMIN — MYCOPHENOLATE MOFETIL 1000 MG: 200 POWDER, FOR SUSPENSION ORAL at 08:12

## 2018-11-26 RX ADMIN — POTASSIUM CHLORIDE 40 MEQ: 20 TABLET, EXTENDED RELEASE ORAL at 12:42

## 2018-11-26 RX ADMIN — SODIUM CHLORIDE TAB 1 GM 1 G: 1 TAB at 17:00

## 2018-11-26 RX ADMIN — SODIUM CHLORIDE TAB 1 GM 1 G: 1 TAB at 08:12

## 2018-11-26 RX ADMIN — HYDRALAZINE HYDROCHLORIDE 100 MG: 50 TABLET, FILM COATED ORAL at 22:11

## 2018-11-26 RX ADMIN — ACYCLOVIR 200 MG: 200 CAPSULE ORAL at 08:12

## 2018-11-26 RX ADMIN — Medication 1 UNITS: at 12:50

## 2018-11-26 RX ADMIN — MYCOPHENOLATE MOFETIL 1000 MG: 250 CAPSULE ORAL at 20:34

## 2018-11-26 ASSESSMENT — PAIN SCALES - GENERAL
PAINLEVEL_OUTOF10: 0

## 2018-11-26 NOTE — PROGRESS NOTES
[facility] (living related)    History of renal transplant 5/18/2015    Hypertension     Left ventricular systolic dysfunction      Past Surgical History:   Procedure Laterality Date    BRONCHOSCOPY N/A 11/15/2018    BRONCHOSCOPY ALVEOLAR LAVAGE performed by Susy Thakur MD at [address] [facility] N/A 11/20/2018    BRONCHOSCOPY ALVEOLAR LAVAGE performed by Susy Thakur MD at [address] [facility]  2014           Subjective  Chart Reviewed: Yes (orders, progress notes)  Patient assessed for rehabilitation services?: Yes  Family / Caregiver Present: No    Subjective: cooperative    General:   Vision: Within Functional Limits    Hearing: Within functional limits         Pain:  Pain Assessment  Patient Currently in Pain: No       Social/Functional History:  Lives With: Spouse  Type of Home: House  Home Layout: One level  Home Access: Level entry     Bathroom Shower/Tub: Walk-in shower  Bathroom Toilet: Standard  Bathroom Equipment: Grab bars around toilet       ADL Assistance: Independent  Homemaking Assistance: Independent       Ambulation Assistance: Independent  Transfer Assistance: Independent    Active : Yes  Mode of Transportation: Car     Additional Comments: Pt reports fully indep PLOF.      Objective    Overall Cognitive Status: Exceptions (impulsive)         Sensation  Overall Sensation Status: WNL                  LUE AROM (degrees)  LUE AROM : WFL          RUE AROM (degrees)  RUE AROM : WFL       LUE Strength  Gross LUE Strength: Exceptions to WFL (3-/5)                RUE Strength  Gross RUE Strength: Exceptions to WFL (3-/5)               ADL  LE Dressing: Minimal assistance (able to cross BLE up to adjust slipper socks, anticipate min A with standing & pulling pants up)          Transfers  Sit to stand: Contact guard assistance (from recliner)  Stand to sit: Contact guard assistance                Functional Mobility  Functional - Mobility Device:

## 2018-11-26 NOTE — PROGRESS NOTES
Assessment and Plan:        1. Acute hypercarbic hypoxemic respiratory failure:  Associated with right lower lobe consolidation.   Persistent hypoxemia secondary to pulmonary embolism. Patient did receive TPA. Extubation 11/25/2018.  2. Immunosuppression: Patient with did undergo a Bronchoscopy Cultures are negative. Continue with prophylaxis including dapsone. Continue with CellCept and Prograf in addition to low-dose prednisone. 3. DVT/PE: Associated with recalcitrant hypoxemia. Patient did receive TPA 11/21/2018. Patient will eventually require lifelong anticoagulation. Patient has known DVT in the right lower extremity. Eliquis was started this was oked per Nephrology, Heparin gtt will be stopped. 4. Acute Hypokalemia: replaced per protocol  5. End-stage renal disease: Undergoing dialysis-CAP. Focal Segmental glomerulosclerosis, failed kidney transplant history. 6. Acute lung injury:  secondary to acute pulmonary edema.   clinically and radiographically improving.  Patient underwent bronchoscopy 11/15/18 to screen for opportunistic infection with his immunosuppressed status. [place] was no mucus within the airway or alveolar hemorrhage. No PCP.   Patient developing increasing oxygen requirements.  Now consolidating to the right lower lobe.  Initiated Zosyn and Zithromax 11/19/18. Mikhail Passer bronchoscopy completed 11/20/18 showed pitting airways disease with minimal mucus.  No evidence of alveolar hemorrhage or infection. No lymphocytes on BAL. Stopped antibiotics 11/21/18. Steroids taken back to basline. Etiology of hypoxemia is DVT/PE. Patient was extubated 11/25/2018  7. Acute on Chronic Systolic CHF, Cardiomyopathy, Cardio Renal Syndrome: Ejection fraction 35%. Improved. On carvedilol.  No diuretic as patient is end-stage renal disease and is on dialysis. .    8. Hyperlipidemia: Aware. Triglyceride level is okay.    9.  Accelerated HPTN: S/P Cardene drip.  Restarted 11/26/2018 Procardia home medication

## 2018-11-26 NOTE — PROGRESS NOTES
Nutrition Assessment    Type and Reason for Visit: Reassess (TF)    Nutrition Recommendations:  Diet per Dr. Yajaira Dennison. Continue to monitor weight, lbs & renal status. Nutrition Assessment:  Pt with hx of renal transplant now on  CAPD extubated & starting po. Pt receiving an additional 520 kcals from CHO/24 hours from PD. Bipap at night. Per Rick Beaver RN pt choked on hamburger last night, but did OK this morning with softer foods. SLP following for cognitive & swallow. 6 bms yesterday. Note pt had gone days without bms. meds include 4.25% Dianeal, prednisone, insulin & bm med. Labs include Na+ 130, K+ 3.4, BUN 94, Cr 11.7, WBC 18. Pt nutritionally compromised d/t recent intubation & ESRD. Pt at further risk d/t cognitive & possible swallow issues. Plan to monitor ability for po & if needed offer ONS. Malnutrition Assessment:  · Malnutrition Status: At risk for malnutrition  Nutrition Risk Level: High    Nutrient Needs:  · Estimated Daily Total Kcal: ~2200 (25/kgm based on 88kgm 11/21)  · Estimated Daily Protein (g): ~91 grams (1.3/kgm IBW of 70kgm - PD - monitoring renal status)    Nutrition Diagnosis:   · Problem: Inadequate oral intake  · Etiology: related to Impaired respiratory function-inability to consume food     Signs and symptoms:  as evidenced by  (recently extubated)    Objective Information:  · Nutrition-Focused Physical Findings: extubated . Pt up in chair.   6 bms recorded yesterday  · Wound Type: None  · Current Nutrition Therapies:  · Oral Diet Orders: Renal   · Oral Diet intake:  (starting)  · Anthropometric Measures:  · Ht: 5' 8\" (172.7 cm)   · Current Body Wt: 187 lb 6.3 oz (85 kg) (11/26 +1 edema)  · Admission Body Wt: 219 lb (99.3 kg) (11/15 with +2 edema)  · Usual Body Wt: 214 lb (97.1 kg) (1/16/18)  · % Weight Change:  ,  weight stable past year  · Ideal Body Wt: 154 lb (69.9 kg), % Ideal Body 125%  · BMI Classification: BMI 25.0 - 29.9 Overweight (29.4)    Nutrition Interventions:   Continue current diet  Continued Inpatient Monitoring, Education not appropriate at this time, Coordination of Care    Nutrition Evaluation:   · Evaluation: Goals set   · Goals: 75% or more of meal itnake during LOS    · Monitoring: Nutrition Progression, Meal Intake, Diet Tolerance, Weight, Pertinent Labs, Skin Integrity, Monitor Bowel Function      Electronically signed by Kimo Pretty RD, LD on 11/26/18 at 2:50 PM    Contact Number: [phone]

## 2018-11-26 NOTE — PROGRESS NOTES
Renal Progress Note    Assessment and Plan:    1. End stage kidney disease and peritoneal dialysis   2.hypertension primary   3. Hypokalemia   4. Focal segmental glomerulosclerosis   5. 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 Hyponatremia Due To End-Stage Kidney Disease and Inability of the Kidneys to Clear Free Water   7. Normocytic Anemia of Chronic Disease   8. Metabolic Acidosis   9. Pulmonary Embolism   10.   Deep Vein Thrombosis  PLAN:   Labs Reviewed   Medications Reviewed   Peritoneal Dialysis Data Reviewed   Good Ultrafiltration   Potassium Replacement   Sodium Bicarbonate  1300 MEq Twice a Day   Labs in the Morning   We'll Follow    Patient Active Problem List:     Accelerated hypertension     Cardiomegaly     Left ventricular systolic dysfunction     Cardiomyopathy (HCC)     FSGS (focal segmental glomerulosclerosis)     HTN (hypertension)     CKD (chronic kidney disease) stage 5, GFR less than 15 ml/min (HCC)     Dyslipidemia     Nephrotic syndrome     Metabolic acidosis     History of renal transplant     Chronic renal allograft nephropathy     Acute respiratory distress     ESRD needing dialysis (HCC)     Acute pulmonary edema (HCC)     Respiratory failure (HCC)     Anemia in chronic kidney disease, on chronic dialysis (HCC)     DVT (deep vein thrombosis) in pregnancy [facility])      Subjective:   Admit Date: 11/15/2018    Interval History:  Seen for End Stage Kidney Disease on Peritoneal Dialysis   Very Awake and Very Alert This Morning   Was Extubated  On the Weekend   Doing Well   No Complaint   Stable Blood Pressure    I Was Updated by the Staff      Medications:   Scheduled Meds:   docusate sodium  100 mg Oral BID    potassium chloride  40 mEq Oral Once    mycophenolate  1,000 mg Oral BID    sodium chloride  1 g Per NG tube TID WC    ipratropium-albuterol  1 ampule Inhalation Q6H WA    insulin lispro  0-6 Units Subcutaneous 4

## 2018-11-27 ENCOUNTER — APPOINTMENT (OUTPATIENT)
Dept: CT IMAGING | Age: 33
DRG: 207 | End: 2018-11-27
Payer: COMMERCIAL

## 2018-11-27 LAB
ANION GAP SERPL CALCULATED.3IONS-SCNC: 28 MEQ/L (ref 8–16)
BASOPHILIA: ABNORMAL
BASOPHILS # BLD: 0.6 %
BASOPHILS ABSOLUTE: 0.1 THOU/MM3 (ref 0–0.1)
BUN BLDV-MCNC: 95 MG/DL (ref 7–22)
CALCIUM SERPL-MCNC: 10 MG/DL (ref 8.5–10.5)
CHLORIDE BLD-SCNC: 86 MEQ/L (ref 98–111)
CO2: 20 MEQ/L (ref 23–33)
CREAT SERPL-MCNC: 13.3 MG/DL (ref 0.4–1.2)
DACROCYTES: ABNORMAL
EOSINOPHIL # BLD: 3 %
EOSINOPHILS ABSOLUTE: 0.5 THOU/MM3 (ref 0–0.4)
ERYTHROCYTE [DISTWIDTH] IN BLOOD BY AUTOMATED COUNT: 15.8 % (ref 11.5–14.5)
ERYTHROCYTE [DISTWIDTH] IN BLOOD BY AUTOMATED COUNT: 53.6 FL (ref 35–45)
GFR SERPL CREATININE-BSD FRML MDRD: 4 ML/MIN/1.73M2
GLUCOSE BLD-MCNC: 107 MG/DL (ref 70–108)
GLUCOSE BLD-MCNC: 120 MG/DL (ref 70–108)
GLUCOSE BLD-MCNC: 147 MG/DL (ref 70–108)
GLUCOSE BLD-MCNC: 155 MG/DL (ref 70–108)
HCT VFR BLD CALC: 28 % (ref 42–52)
HEMOGLOBIN: 8.7 GM/DL (ref 14–18)
IMMATURE GRANS (ABS): 0.58 THOU/MM3 (ref 0–0.07)
IMMATURE GRANULOCYTES: 3.6 %
LYMPHOCYTES # BLD: 6.1 %
LYMPHOCYTES ABSOLUTE: 1 THOU/MM3 (ref 1–4.8)
MCH RBC QN AUTO: 28.8 PG (ref 26–33)
MCHC RBC AUTO-ENTMCNC: 31.1 GM/DL (ref 32.2–35.5)
MCV RBC AUTO: 92.7 FL (ref 80–94)
MONOCYTES # BLD: 12.6 %
MONOCYTES ABSOLUTE: 2.1 THOU/MM3 (ref 0.4–1.3)
NUCLEATED RED BLOOD CELLS: 0 /100 WBC
PATHOLOGIST REVIEW: ABNORMAL
PLATELET # BLD: 326 THOU/MM3 (ref 130–400)
PLATELET ESTIMATE: ADEQUATE
PMV BLD AUTO: 9.9 FL (ref 9.4–12.4)
POIKILOCYTES: ABNORMAL
POTASSIUM SERPL-SCNC: 3.4 MEQ/L (ref 3.5–5.2)
RBC # BLD: 3.02 MILL/MM3 (ref 4.7–6.1)
SCAN OF BLOOD SMEAR: NORMAL
SEG NEUTROPHILS: 74.1 %
SEGMENTED NEUTROPHILS ABSOLUTE COUNT: 12.1 THOU/MM3 (ref 1.8–7.7)
SODIUM BLD-SCNC: 134 MEQ/L (ref 135–145)
SPHEROCYTES: ABNORMAL
WBC # BLD: 16.3 THOU/MM3 (ref 4.8–10.8)

## 2018-11-27 PROCEDURE — 82948 REAGENT STRIP/BLOOD GLUCOSE: CPT

## 2018-11-27 PROCEDURE — 6370000000 HC RX 637 (ALT 250 FOR IP): Performed by: NURSE PRACTITIONER

## 2018-11-27 PROCEDURE — 6370000000 HC RX 637 (ALT 250 FOR IP): Performed by: INTERNAL MEDICINE

## 2018-11-27 PROCEDURE — 97535 SELF CARE MNGMENT TRAINING: CPT

## 2018-11-27 PROCEDURE — 1200000003 HC TELEMETRY R&B

## 2018-11-27 PROCEDURE — 2580000003 HC RX 258: Performed by: INTERNAL MEDICINE

## 2018-11-27 PROCEDURE — 6360000002 HC RX W HCPCS: Performed by: INTERNAL MEDICINE

## 2018-11-27 PROCEDURE — 97530 THERAPEUTIC ACTIVITIES: CPT

## 2018-11-27 PROCEDURE — 97116 GAIT TRAINING THERAPY: CPT

## 2018-11-27 PROCEDURE — 36415 COLL VENOUS BLD VENIPUNCTURE: CPT

## 2018-11-27 PROCEDURE — 85025 COMPLETE CBC W/AUTO DIFF WBC: CPT

## 2018-11-27 PROCEDURE — 90945 DIALYSIS ONE EVALUATION: CPT | Performed by: INTERNAL MEDICINE

## 2018-11-27 PROCEDURE — 80048 BASIC METABOLIC PNL TOTAL CA: CPT

## 2018-11-27 PROCEDURE — 97110 THERAPEUTIC EXERCISES: CPT

## 2018-11-27 PROCEDURE — 92523 SPEECH SOUND LANG COMPREHEN: CPT

## 2018-11-27 PROCEDURE — 2709999900 HC NON-CHARGEABLE SUPPLY

## 2018-11-27 PROCEDURE — 2700000000 HC OXYGEN THERAPY PER DAY

## 2018-11-27 PROCEDURE — 70450 CT HEAD/BRAIN W/O DYE: CPT

## 2018-11-27 RX ORDER — POTASSIUM CHLORIDE 20 MEQ/1
40 TABLET, EXTENDED RELEASE ORAL ONCE
Status: COMPLETED | OUTPATIENT
Start: 2018-11-27 | End: 2018-11-27

## 2018-11-27 RX ORDER — CLONIDINE HYDROCHLORIDE 0.1 MG/1
0.1 TABLET ORAL 2 TIMES DAILY
Status: DISCONTINUED | OUTPATIENT
Start: 2018-11-27 | End: 2018-11-28

## 2018-11-27 RX ORDER — SODIUM CHLORIDE 0.9 % (FLUSH) 0.9 %
10 SYRINGE (ML) INJECTION PRN
Status: DISCONTINUED | OUTPATIENT
Start: 2018-11-27 | End: 2018-11-28 | Stop reason: HOSPADM

## 2018-11-27 RX ORDER — NIFEDIPINE 90 MG/1
90 TABLET, EXTENDED RELEASE ORAL DAILY
Status: DISCONTINUED | OUTPATIENT
Start: 2018-11-28 | End: 2018-11-28 | Stop reason: HOSPADM

## 2018-11-27 RX ORDER — SODIUM CHLORIDE, SODIUM LACTATE, CALCIUM CHLORIDE, MAGNESIUM CHLORIDE AND DEXTROSE 2.5; 538; 448; 18.3; 5.08 G/100ML; MG/100ML; MG/100ML; MG/100ML; MG/100ML
2000 INJECTION, SOLUTION INTRAPERITONEAL EVERY 6 HOURS
Status: DISCONTINUED | OUTPATIENT
Start: 2018-11-27 | End: 2018-11-28 | Stop reason: HOSPADM

## 2018-11-27 RX ORDER — NIFEDIPINE 60 MG/1
60 TABLET, EXTENDED RELEASE ORAL DAILY
Status: DISCONTINUED | OUTPATIENT
Start: 2018-11-27 | End: 2018-11-27

## 2018-11-27 RX ORDER — DOCUSATE SODIUM 100 MG/1
100 CAPSULE, LIQUID FILLED ORAL 2 TIMES DAILY
Status: DISCONTINUED | OUTPATIENT
Start: 2018-11-27 | End: 2018-11-27 | Stop reason: SDUPTHER

## 2018-11-27 RX ORDER — SODIUM CHLORIDE 0.9 % (FLUSH) 0.9 %
10 SYRINGE (ML) INJECTION EVERY 12 HOURS SCHEDULED
Status: DISCONTINUED | OUTPATIENT
Start: 2018-11-27 | End: 2018-11-28 | Stop reason: HOSPADM

## 2018-11-27 RX ORDER — SODIUM BICARBONATE 650 MG/1
1300 TABLET ORAL 2 TIMES DAILY
Status: DISCONTINUED | OUTPATIENT
Start: 2018-11-27 | End: 2018-11-28 | Stop reason: HOSPADM

## 2018-11-27 RX ADMIN — APIXABAN 2.5 MG: 2.5 TABLET, FILM COATED ORAL at 21:43

## 2018-11-27 RX ADMIN — SODIUM CHLORIDE, SODIUM LACTATE, CALCIUM CHLORIDE, MAGNESIUM CHLORIDE AND DEXTROSE 2000 ML: 2.5; 538; 448; 18.3; 5.08 INJECTION, SOLUTION INTRAPERITONEAL at 09:40

## 2018-11-27 RX ADMIN — PREDNISONE 5 MG: 5 TABLET ORAL at 08:34

## 2018-11-27 RX ADMIN — SODIUM CHLORIDE, SODIUM LACTATE, CALCIUM CHLORIDE, MAGNESIUM CHLORIDE AND DEXTROSE 2000 ML: 2.5; 538; 448; 18.3; 5.08 INJECTION, SOLUTION INTRAPERITONEAL at 15:45

## 2018-11-27 RX ADMIN — CLONIDINE HYDROCHLORIDE 0.1 MG: 0.1 TABLET ORAL at 20:04

## 2018-11-27 RX ADMIN — DOCUSATE SODIUM 100 MG: 100 CAPSULE, LIQUID FILLED ORAL at 08:34

## 2018-11-27 RX ADMIN — CARVEDILOL 25 MG: 25 TABLET, FILM COATED ORAL at 16:44

## 2018-11-27 RX ADMIN — SODIUM CHLORIDE TAB 1 GM 1 G: 1 TAB at 16:44

## 2018-11-27 RX ADMIN — SODIUM BICARBONATE 1300 MG: 650 TABLET ORAL at 08:34

## 2018-11-27 RX ADMIN — HYDRALAZINE HYDROCHLORIDE 100 MG: 50 TABLET, FILM COATED ORAL at 14:21

## 2018-11-27 RX ADMIN — CARVEDILOL 25 MG: 25 TABLET, FILM COATED ORAL at 07:33

## 2018-11-27 RX ADMIN — Medication 10 ML: at 21:50

## 2018-11-27 RX ADMIN — NIFEDIPINE 60 MG: 60 TABLET, FILM COATED, EXTENDED RELEASE ORAL at 08:34

## 2018-11-27 RX ADMIN — SODIUM BICARBONATE 1300 MG: 650 TABLET ORAL at 20:04

## 2018-11-27 RX ADMIN — SODIUM CHLORIDE TAB 1 GM 1 G: 1 TAB at 07:33

## 2018-11-27 RX ADMIN — SODIUM CHLORIDE TAB 1 GM 1 G: 1 TAB at 12:34

## 2018-11-27 RX ADMIN — CLONIDINE HYDROCHLORIDE 0.1 MG: 0.1 TABLET ORAL at 08:34

## 2018-11-27 RX ADMIN — MYCOPHENOLATE MOFETIL 1000 MG: 250 CAPSULE ORAL at 08:34

## 2018-11-27 RX ADMIN — POTASSIUM CHLORIDE 40 MEQ: 20 TABLET, EXTENDED RELEASE ORAL at 06:37

## 2018-11-27 RX ADMIN — Medication 10 ML: at 21:51

## 2018-11-27 RX ADMIN — Medication 10 ML: at 21:49

## 2018-11-27 RX ADMIN — MYCOPHENOLATE MOFETIL 1000 MG: 250 CAPSULE ORAL at 21:44

## 2018-11-27 RX ADMIN — Medication 10 ML: at 08:35

## 2018-11-27 RX ADMIN — CLONIDINE HYDROCHLORIDE 0.1 MG: 0.1 TABLET ORAL at 03:28

## 2018-11-27 RX ADMIN — HYDRALAZINE HYDROCHLORIDE 100 MG: 50 TABLET, FILM COATED ORAL at 06:37

## 2018-11-27 RX ADMIN — HYDRALAZINE HYDROCHLORIDE 100 MG: 50 TABLET, FILM COATED ORAL at 21:43

## 2018-11-27 RX ADMIN — SODIUM CHLORIDE, SODIUM LACTATE, CALCIUM CHLORIDE, MAGNESIUM CHLORIDE AND DEXTROSE 1500 ML: 4.25; 538; 448; 18.3; 5.08 INJECTION, SOLUTION INTRAPERITONEAL at 03:35

## 2018-11-27 RX ADMIN — APIXABAN 2.5 MG: 2.5 TABLET, FILM COATED ORAL at 08:34

## 2018-11-27 RX ADMIN — SODIUM CHLORIDE, SODIUM LACTATE, CALCIUM CHLORIDE, MAGNESIUM CHLORIDE AND DEXTROSE 2000 ML: 2.5; 538; 448; 18.3; 5.08 INJECTION, SOLUTION INTRAPERITONEAL at 21:25

## 2018-11-27 RX ADMIN — DAPSONE 100 MG: 25 TABLET ORAL at 08:34

## 2018-11-27 RX ADMIN — DOCUSATE SODIUM 100 MG: 100 CAPSULE, LIQUID FILLED ORAL at 21:43

## 2018-11-27 ASSESSMENT — PAIN SCALES - GENERAL
PAINLEVEL_OUTOF10: 0

## 2018-11-27 NOTE — PROGRESS NOTES
HPTN: S/P Cardene drip.  Restarted 11/26/2018 Procardia home medication may need to titrate up dose. 10. Metabolic acidosis: Anion gap positive.  Secondary to renal failure.  Sodium Bicarb tablets were increased per Nephrology 11/26/2018 Sodium Bicarbonate Tablets home medication ordered as per Dr. Bacon Manual recommendations. 11. Hyponatremia chronic, Nephrology is following patient is on Salt Tablets will follow  12. Physical Deconditioning: PT and OT to evaluate and treat     CC:  Acute hypoxemic hypercarbic respiratory failure  HPI: Patient is a 69-year-old white male nonsmoker. Radha Mauricio has a history of hyperlipidemia hypertension and cardiomyopathy.  He has chronic renal disease with end-stage disease requiring renal transplant in April 2014.  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 Radha Mauricio was managed with Prograf and CellCept in addition to prednisone chronically. Radha Mauricio did require prophylaxis with acyclovir.  Since that time, the patient has required placement of peritoneal shunt. Patient presented emergently to the emergency room 11/15/18 with severe shortness of breath.  He was found to have diffuse bilateral infiltrates consistent with pulmonary edema.  He was very hypoxemic and required BiPAP salvage and subsequent intubation.  He was admitted to the intensive care unit where dialysis catheter was placed and emergent dialysis initiated. ROS: Sedated on mechanical ventilator  PMH:  Per HPI  SHX:  Lifetime nonsmoker. FHX: Hypertension and renal disease.    Allergies: Sulfa.     Medications:     dextrose        sodium chloride flush  10 mL Intravenous 2 times per day    sodium bicarbonate  1,300 mg Oral BID    NIFEdipine  60 mg Oral Daily    cloNIDine  0.1 mg Oral BID    dianeal lo-eligio (ULTRABAG) 2.5%  2,000 mL Intraperitoneal Q6H    docusate sodium  100 mg Oral BID    apixaban  2.5 mg Oral BID    sodium chloride  1 g Oral TID WC    mycophenolate  1,000 mg Oral BID    insulin

## 2018-11-27 NOTE — PROGRESS NOTES
decreased cognition    Assessment:     Performance deficits / Impairments: Decreased functional mobility , Decreased safe awareness, Decreased ADL status, Decreased cognition, Decreased balance, Decreased endurance  Prognosis: Fair    Discharge Recommendations:  Discharge Recommendations: Continue to assess pending progress    Patient Education:  Patient Education: OT role, POC, safety with mobility  Barriers to Learning: impulsivity    Equipment Recommendations:  Equipment Needed: Yes  Other: RW    Safety:  Safety Devices in place: Yes  Type of devices:  All fall risk precautions in place, Call light within reach, Gait belt, Chair alarm in place (telasitter in room)    Plan:  Times per week: 5x  Current Treatment Recommendations: Endurance Training, Balance Training, Strengthening, Functional Mobility Training, Self-Care / ADL, Safety Education & Training    Goals:  Patient goals : go home    Short term goals  Time Frame for Short term goals: 2 weeks  Short term goal 1: Complete various t/fs including toilet with CGA & min vcs for safety  Short term goal 2: Complete 3-4 min standing with CGA for increased indep with sinkside grooming  Short term goal 3: Complete mobility to/from bathroom with RW, CGA, & 0-2 vcs for safety  Short term goal 4: Complete BUE moderate strengthening exercises to increase UB strength to A with toilet t/fs  Long term goals  Time Frame for Long term goals : No LTG set d/t short ELOS

## 2018-11-27 NOTE — PROGRESS NOTES
symptoms and risk of aspiration     [] Demonstrated how to thick liquid appropriately. [x] Reviewed goals and Plan of Care     [] OTHER:   Method: [x] Discussion [x] Demonstration [] Hand-out     [] OTHER:   Evaluation of Education:     [x] Verbalizes understanding [x] Demonstrates with assistance     [] Demonstrates without assistance []Needs further instruction     [] No evidence of learning  [x] Family not present    PLAN / TREATMENT RECOMMENDATIONS:  [x] Skilled SLP intervention on acute care 3-5 x per week or until goals met and/or pt plateaus in function. Specific interventions for next session may include: cognitive tasks       SHORT TERM GOALS:  Short-term Goals  Timeframe for Short-term Goals: 2 weeks  Goal 1: Pt will tolerate regular diet with thin liquids (straws ok) with no s/s of aspiration to ensure safe and adequate nutrition  Goal 2: Patient will complete orientation, short term memory and working memory tasks with 70% accuracy provided mod cues to improve overall recall of daily and medical information  Goal 3: Patient will complete problem solving/reasoning tasks with 75% accuracy provided mod cues to improve successful return to work and community setting  Goal 4: Patient will complete sequencing, divergent naming tasks with 75% accuracy provided mod cues to improve overall thought organization and processing speed.    Goal 5: Patient will complete complex attention tasks with no more than 3 errors within 3 minutes in order to succesfuly return to work and [address]:  No LTGs due to PARESH Mcgee 21

## 2018-11-27 NOTE — PROGRESS NOTES
notified    Plan:  Times per week: 5x GM  Specific instructions for Next Treatment: standing and walking balance, walking endurance, strengthening  Current Treatment Recommendations: Functional Mobility Training, Strengthening, ROM, Transfer Training, Balance Training, Endurance Training, Equipment Evaluation, Education, & procurement, Safety Education & Training, Home Exercise Program, Patient/Caregiver Education & Training, Gait Training    Goals:  Patient goals : return home    Short term goals  Time Frame for Short term goals: 2 weeks  Short term goal 1: pt will sit <> stand with S for getting up from various surfaces  Short term goal 2: pt will ambulate >250 feet with RW and S for walking endurance  Short term goal 3: pt will perform all PT assigned exercises with O2 sat >92% and HR <135 bpm for improved activity tolerance     Long term goals  Time Frame for Long term goals : not written due to short ELOS

## 2018-11-28 ENCOUNTER — APPOINTMENT (OUTPATIENT)
Dept: GENERAL RADIOLOGY | Age: 33
DRG: 207 | End: 2018-11-28
Payer: COMMERCIAL

## 2018-11-28 VITALS
HEIGHT: 68 IN | OXYGEN SATURATION: 92 % | RESPIRATION RATE: 18 BRPM | TEMPERATURE: 98.5 F | HEART RATE: 128 BPM | BODY MASS INDEX: 25.87 KG/M2 | DIASTOLIC BLOOD PRESSURE: 61 MMHG | WEIGHT: 170.7 LBS | SYSTOLIC BLOOD PRESSURE: 123 MMHG

## 2018-11-28 LAB
ANION GAP SERPL CALCULATED.3IONS-SCNC: 28 MEQ/L (ref 8–16)
BUN BLDV-MCNC: 98 MG/DL (ref 7–22)
CALCIUM SERPL-MCNC: 10.1 MG/DL (ref 8.5–10.5)
CHLORIDE BLD-SCNC: 90 MEQ/L (ref 98–111)
CO2: 20 MEQ/L (ref 23–33)
CREAT SERPL-MCNC: 14.2 MG/DL (ref 0.4–1.2)
ERYTHROCYTE [DISTWIDTH] IN BLOOD BY AUTOMATED COUNT: 16.1 % (ref 11.5–14.5)
ERYTHROCYTE [DISTWIDTH] IN BLOOD BY AUTOMATED COUNT: 54.4 FL (ref 35–45)
GFR SERPL CREATININE-BSD FRML MDRD: 4 ML/MIN/1.73M2
GLUCOSE BLD-MCNC: 105 MG/DL (ref 70–108)
GLUCOSE BLD-MCNC: 134 MG/DL (ref 70–108)
GLUCOSE BLD-MCNC: 164 MG/DL (ref 70–108)
HCT VFR BLD CALC: 29.3 % (ref 42–52)
HEMOGLOBIN: 9 GM/DL (ref 14–18)
MAGNESIUM: 2.8 MG/DL (ref 1.6–2.4)
MCH RBC QN AUTO: 28.9 PG (ref 26–33)
MCHC RBC AUTO-ENTMCNC: 30.7 GM/DL (ref 32.2–35.5)
MCV RBC AUTO: 94.2 FL (ref 80–94)
PLATELET # BLD: 330 THOU/MM3 (ref 130–400)
PMV BLD AUTO: 9.8 FL (ref 9.4–12.4)
POTASSIUM REFLEX MAGNESIUM: 3.6 MEQ/L (ref 3.5–5.2)
POTASSIUM SERPL-SCNC: 3.6 MEQ/L (ref 3.5–5.2)
PROCALCITONIN: 1.69 NG/ML (ref 0.01–0.09)
RBC # BLD: 3.11 MILL/MM3 (ref 4.7–6.1)
SODIUM BLD-SCNC: 138 MEQ/L (ref 135–145)
WBC # BLD: 14.7 THOU/MM3 (ref 4.8–10.8)

## 2018-11-28 PROCEDURE — 97127 HC SP THER IVNTJ W/FOCUS COG FUNCJ: CPT | Performed by: SPEECH-LANGUAGE PATHOLOGIST

## 2018-11-28 PROCEDURE — 99239 HOSP IP/OBS DSCHRG MGMT >30: CPT | Performed by: INTERNAL MEDICINE

## 2018-11-28 PROCEDURE — 99999 PR OFFICE/OUTPT VISIT,PROCEDURE ONLY: CPT | Performed by: NURSE PRACTITIONER

## 2018-11-28 PROCEDURE — 85027 COMPLETE CBC AUTOMATED: CPT

## 2018-11-28 PROCEDURE — 36415 COLL VENOUS BLD VENIPUNCTURE: CPT

## 2018-11-28 PROCEDURE — 71046 X-RAY EXAM CHEST 2 VIEWS: CPT

## 2018-11-28 PROCEDURE — 6370000000 HC RX 637 (ALT 250 FOR IP): Performed by: NURSE PRACTITIONER

## 2018-11-28 PROCEDURE — 6360000002 HC RX W HCPCS: Performed by: INTERNAL MEDICINE

## 2018-11-28 PROCEDURE — 2580000003 HC RX 258: Performed by: INTERNAL MEDICINE

## 2018-11-28 PROCEDURE — 90945 DIALYSIS ONE EVALUATION: CPT | Performed by: INTERNAL MEDICINE

## 2018-11-28 PROCEDURE — 6370000000 HC RX 637 (ALT 250 FOR IP): Performed by: INTERNAL MEDICINE

## 2018-11-28 PROCEDURE — 97110 THERAPEUTIC EXERCISES: CPT

## 2018-11-28 PROCEDURE — 84145 PROCALCITONIN (PCT): CPT

## 2018-11-28 PROCEDURE — 82948 REAGENT STRIP/BLOOD GLUCOSE: CPT

## 2018-11-28 PROCEDURE — 83735 ASSAY OF MAGNESIUM: CPT

## 2018-11-28 PROCEDURE — 97535 SELF CARE MNGMENT TRAINING: CPT

## 2018-11-28 PROCEDURE — 84132 ASSAY OF SERUM POTASSIUM: CPT

## 2018-11-28 PROCEDURE — 80048 BASIC METABOLIC PNL TOTAL CA: CPT

## 2018-11-28 RX ORDER — CLONIDINE HYDROCHLORIDE 0.1 MG/1
0.1 TABLET ORAL 3 TIMES DAILY
Qty: 60 TABLET | Refills: 3 | DISCHARGE
Start: 2018-11-28

## 2018-11-28 RX ORDER — PSEUDOEPHEDRINE HCL 30 MG
100 TABLET ORAL 2 TIMES DAILY
DISCHARGE
Start: 2018-11-28

## 2018-11-28 RX ORDER — SODIUM BICARBONATE 650 MG/1
1300 TABLET ORAL 2 TIMES DAILY
DISCHARGE
Start: 2018-11-28 | End: 2018-01-01 | Stop reason: ALTCHOICE

## 2018-11-28 RX ORDER — SEVELAMER CARBONATE 800 MG/1
800 TABLET, FILM COATED ORAL
Qty: 90 TABLET | Refills: 3 | DISCHARGE
Start: 2018-11-28

## 2018-11-28 RX ORDER — SEVELAMER CARBONATE 800 MG/1
800 TABLET, FILM COATED ORAL
Status: DISCONTINUED | OUTPATIENT
Start: 2018-11-28 | End: 2018-11-28 | Stop reason: HOSPADM

## 2018-11-28 RX ORDER — CLONIDINE HYDROCHLORIDE 0.1 MG/1
0.1 TABLET ORAL 3 TIMES DAILY
Status: DISCONTINUED | OUTPATIENT
Start: 2018-11-28 | End: 2018-11-28 | Stop reason: HOSPADM

## 2018-11-28 RX ORDER — SENNOSIDES 8.6 MG
1 TABLET ORAL DAILY
Qty: 120 TABLET | Refills: 0 | DISCHARGE
Start: 2018-11-28

## 2018-11-28 RX ORDER — HYDRALAZINE HYDROCHLORIDE 25 MG/1
25 TABLET, FILM COATED ORAL EVERY 8 HOURS SCHEDULED
Status: DISCONTINUED | OUTPATIENT
Start: 2018-11-28 | End: 2018-11-28 | Stop reason: HOSPADM

## 2018-11-28 RX ADMIN — SODIUM CHLORIDE TAB 1 GM 1 G: 1 TAB at 07:42

## 2018-11-28 RX ADMIN — DOCUSATE SODIUM 100 MG: 100 CAPSULE, LIQUID FILLED ORAL at 07:43

## 2018-11-28 RX ADMIN — SEVELAMER CARBONATE 800 MG: 800 TABLET, FILM COATED ORAL at 11:21

## 2018-11-28 RX ADMIN — NIFEDIPINE 90 MG: 90 TABLET, EXTENDED RELEASE ORAL at 07:43

## 2018-11-28 RX ADMIN — DAPSONE 100 MG: 25 TABLET ORAL at 07:43

## 2018-11-28 RX ADMIN — APIXABAN 2.5 MG: 2.5 TABLET, FILM COATED ORAL at 07:43

## 2018-11-28 RX ADMIN — CARVEDILOL 25 MG: 25 TABLET, FILM COATED ORAL at 07:43

## 2018-11-28 RX ADMIN — Medication 10 ML: at 07:42

## 2018-11-28 RX ADMIN — SODIUM BICARBONATE 1300 MG: 650 TABLET ORAL at 07:43

## 2018-11-28 RX ADMIN — MYCOPHENOLATE MOFETIL 1000 MG: 250 CAPSULE ORAL at 07:42

## 2018-11-28 RX ADMIN — SODIUM CHLORIDE, SODIUM LACTATE, CALCIUM CHLORIDE, MAGNESIUM CHLORIDE AND DEXTROSE 2000 ML: 2.5; 538; 448; 18.3; 5.08 INJECTION, SOLUTION INTRAPERITONEAL at 04:38

## 2018-11-28 RX ADMIN — CLONIDINE HYDROCHLORIDE 0.1 MG: 0.1 TABLET ORAL at 07:43

## 2018-11-28 RX ADMIN — HYDRALAZINE HYDROCHLORIDE 25 MG: 25 TABLET, FILM COATED ORAL at 14:15

## 2018-11-28 RX ADMIN — HYDRALAZINE HYDROCHLORIDE 100 MG: 50 TABLET, FILM COATED ORAL at 05:00

## 2018-11-28 RX ADMIN — SODIUM CHLORIDE, SODIUM LACTATE, CALCIUM CHLORIDE, MAGNESIUM CHLORIDE AND DEXTROSE 2000 ML: 2.5; 538; 448; 18.3; 5.08 INJECTION, SOLUTION INTRAPERITONEAL at 09:15

## 2018-11-28 RX ADMIN — PREDNISONE 5 MG: 5 TABLET ORAL at 07:43

## 2018-11-28 ASSESSMENT — PAIN SCALES - GENERAL
PAINLEVEL_OUTOF10: 0
PAINLEVEL_OUTOF10: 0

## 2018-11-28 NOTE — PLAN OF CARE
Problem: Falls - Risk of:  Goal: Will remain free from falls  Will remain free from falls   Outcome: Met This Shift  Patient remains lightly sedated on ventilator. Call light in reach. Hourly checks performed and charted. Will continue to monitor. Problem: Risk for Impaired Skin Integrity  Goal: Tissue integrity - skin and mucous membranes  Structural intactness and normal physiological function of skin and  mucous membranes. Outcome: Met This Shift  No signs of skin breakdown. Skin warm, dry, intact. Mucous membranes pink, moist. Patient turn every 2 hours and PRN with pillow support. Low air loss mattress in place. Problem: Nutrition  Goal: Optimal nutrition therapy  Outcome: Met This Shift  Patient on continuous tube feed. Tolerating well. Will continue to monitor. Problem: Restraint Use - Nonviolent/Non-Self-Destructive Behavior:  Goal: Absence of restraint indications  Absence of restraint indications   Outcome: Ongoing  Patient will move arms to pull at lines and tubes. Verbal redirection with no evidence of learning. Will continue to monitor. Goal: Absence of restraint-related injury  Absence of restraint-related injury   Outcome: Met This Shift  Absent of restraint related injury this shift. Hourly checks performed and charted. Will continue to monitor. Problem: Discharge Planning:  Goal: Participates in care planning  Participates in care planning   Outcome: Ongoing  Patient unable to participate in plan of care. Family updated and verbalizes understanding. Problem: Airway Clearance - Ineffective:  Goal: Ability to maintain a clear airway will improve  Ability to maintain a clear airway will improve   Outcome: Ongoing  Patient remains orally intubated. A lot of secretions suctioned this shift. Will continue to monitor. Comments: Patient is unable to participate in care planning. Family has been updated and understands.
Problem: Falls - Risk of:  Goal: Will remain free from falls  Will remain free from falls   Outcome: Met This Shift  Side rails up times 4 for therapy mode  Goal: Absence of physical injury  Absence of physical injury   Outcome: Met This Shift  No sign of physical injury     Problem: Risk for Impaired Skin Integrity  Goal: Tissue integrity - skin and mucous membranes  Structural intactness and normal physiological function of skin and  mucous membranes. Outcome: Ongoing  Reposition and turn q 2hours      Problem: Nutrition  Goal: Optimal nutrition therapy  Outcome: Ongoing  Tolerating  tube feed     Problem: Restraint Use - Nonviolent/Non-Self-Destructive Behavior:  Goal: Absence of restraint indications  Absence of restraint indications   Outcome: Ongoing  Patient awakens momentarily and reaches for ett  Goal: Absence of restraint-related injury  Absence of restraint-related injury   Outcome: Met This Shift  No sign of restraint related injury     Problem: Discharge Planning:  Goal: Participates in care planning  Participates in care planning   Outcome: Ongoing  Care plan reviewed with patient and wife.  Wife verbalize understanding of the plan of care and contribute to goal setting.       Problem: Airway Clearance - Ineffective:  Goal: Ability to maintain a clear airway will improve  Ability to maintain a clear airway will improve   Outcome: Ongoing  Suction via ett     Problem: Anxiety/Stress:  Goal: Level of anxiety will decrease  Level of anxiety will decrease   Outcome: Ongoing  titrating propofol according to rass score     Problem: Cardiac Output - Decreased:  Goal: Hemodynamic stability will improve  Hemodynamic stability will improve   Outcome: Ongoing  Oral meds for blood preassure control     Problem: Fluid Volume - Imbalance:  Goal: Absence of imbalanced fluid volume signs and symptoms  Absence of imbalanced fluid volume signs and symptoms   Outcome: Ongoing  peritoneal dialysis      Problem: Gas
Problem: Falls - Risk of:  Goal: Will remain free from falls  Will remain free from falls   Outcome: Ongoing  No falls this shift. Patient remains on falling star program with fall band on and falling star on door. Bed exit alarm on. Goal: Absence of physical injury  Absence of physical injury   Outcome: Ongoing  Patient remains free from physical injury      Problem: Risk for Impaired Skin Integrity  Goal: Tissue integrity - skin and mucous membranes  Structural intactness and normal physiological function of skin and  mucous membranes. Outcome: Ongoing  No skin breakdown noted to bony prominences or coccyx area. Patient is turned every 2 hours and prn with back care given at this time. Problem: Nutrition  Goal: Optimal nutrition therapy  Outcome: Ongoing  Continuous tube feeding via OG. Nepro at 30mL/hr. Tolerating with zero mL of residual.     Problem: Restraint Use - Nonviolent/Non-Self-Destructive Behavior:  Goal: Absence of restraint indications  Absence of restraint indications   Outcome: Ongoing  Patient is at risk for pulling out lines or tubes. Patient has attempted to reach for ET tube while not restrained. Goal: Absence of restraint-related injury  Absence of restraint-related injury   Outcome: Ongoing  Patient has not had any restraint related injuries while in restraints. Skin under both wrists intact. Comments: NO family present, patient sedated on ventilator, unable to review at this time.
Problem: Falls - Risk of:  Goal: Will remain free from falls  Will remain free from falls   Outcome: Ongoing  Patient in bed with bed alarm on, call light within reach and being used appropriately. Non-skid socks on when patient is out of bed. Up with one assist and walker. Problem: Risk for Impaired Skin Integrity  Goal: Tissue integrity - skin and mucous membranes  Structural intactness and normal physiological function of skin and  mucous membranes. Outcome: Ongoing  Patient turns and repositions self frequently. No new skin breakdown at this time. Problem: Discharge Planning:  Goal: Participates in care planning  Participates in care planning   Outcome: Ongoing  Patient able to assist in identifying goals throughout the shift, updated on plan of care. Problem: Cardiac Output - Decreased:  Goal: Hemodynamic stability will improve  Hemodynamic stability will improve   Outcome: Ongoing  Vitals:    11/28/18 0436   BP: 132/78   Pulse: 130   Resp: 18   Temp: 98.9 °F (37.2 °C)   SpO2: 91%         Problem: Gas Exchange - Impaired:  Goal: Levels of oxygenation will improve  Levels of oxygenation will improve   Outcome: Ongoing  Patient's SPO2 88-89% on room air, 92% on 1L nasal cannula. Comments: Care plan reviewed with patient. Patient verbalize understanding of the plan of care and contribute to goal setting.
Problem: Falls - Risk of:  Goal: Will remain free from falls  Will remain free from falls   Outcome: Ongoing  Restrained with side rails up, sedated. Low fall risk. Problem: Risk for Impaired Skin Integrity  Goal: Tissue integrity - skin and mucous membranes  Structural intactness and normal physiological function of skin and  mucous membranes. Outcome: Ongoing  Turn q2h, low airloss alt. Pressure mattress. Problem: Nutrition  Goal: Optimal nutrition therapy  Outcome: Ongoing  Tube feeds continuous at goal, tolerating well. Problem: Restraint Use - Nonviolent/Non-Self-Destructive Behavior:  Goal: Absence of restraint indications  Absence of restraint indications   Outcome: Ongoing  Reaches up for tube when restraints are loose. Nods in understanding the importance of the breathing tube but seems to forget quite quickly. Goal: Absence of restraint-related injury  Absence of restraint-related injury   Outcome: Ongoing  Soft restraints, no redness. Problem: Discharge Planning:  Goal: Participates in care planning  Participates in care planning   Outcome: Ongoing  Pending course, plans to return home. Set up for pd Tuesday. Problem: Airway Clearance - Ineffective:  Goal: Ability to maintain a clear airway will improve  Ability to maintain a clear airway will improve   Outcome: Ongoing  Strong cough and gag. Problem: Gas Exchange - Impaired:  Goal: Levels of oxygenation will improve  Levels of oxygenation will improve   Outcome: Ongoing  O2 drops with repositioning at times. Have been weaning as able, spo2 monitor and abg po2 with spo2 do not correlate. Comments: Care plan reviewed with patient and family. Fmaily verbalizes understanding of the plan of care and contributes to goal setting.
Problem: Impaired respiratory status  Goal: Clear lung sounds  Outcome: Ongoing  Pt has clear/diminished breath sounds. txs to help improve lung aeration.
Problem: Impaired respiratory status  Goal: Will be able to breathe spontaneously, without ventilator support  Will be able to breathe spontaneously, without ventilator support     Outcome: Not Met This Shift  Patient remains on vent at this time and is tolerating well. Will continue to monitor patient for weaning readiness.
Problem: Impaired respiratory status  Goal: Will be able to breathe spontaneously, without ventilator support  Will be able to breathe spontaneously, without ventilator support     Outcome: Not Met This Shift  Pt continues on ventilator, oxygen has been increased the past shift.  Continue to monitor for needed changes
Problem: Impaired respiratory status  Goal: Will be able to breathe spontaneously, without ventilator support  Will be able to breathe spontaneously, without ventilator support     Outcome: Not Met This Shift  Pt intubated today, will wean ventilator per physician orders and monitor pt tolerance
Problem: Impaired respiratory status  Goal: Will be able to breathe spontaneously, without ventilator support  Will be able to breathe spontaneously, without ventilator support     Outcome: Ongoing  Patient continues on ventilator; patient is currently on weaning trial.  Will continue to monitor patient
Problem: Impaired respiratory status  Goal: Will be able to breathe spontaneously, without ventilator support  Will be able to breathe spontaneously, without ventilator support     Outcome: Ongoing  Pt not weanable at this time.  Pee 16 and 95% FiO2
Problem: Impaired respiratory status  Goal: Will be able to breathe spontaneously, without ventilator support  Will be able to breathe spontaneously, without ventilator support     Outcome: Ongoing  We have been able to wean PEEP a little=will continue to wean as pt tolerates.   And continue with current plan
Problem: Impaired respiratory status  Goal: Will be able to breathe spontaneously, without ventilator support  Will be able to breathe spontaneously, without ventilator support    Outcome: Ongoing  Continue mechanical ventilation to maintain adequate respiratory status.
Problem: Nutrition  Goal: Optimal nutrition therapy  Outcome: Ongoing  Nutrition Problem: Inadequate oral intake  Intervention: Food and/or Nutrient Delivery: Continue NPO, Modify current Tube Feeding  Nutritional Goals: TF to provide % nutrient needs while intubated
Problem: Nutrition  Goal: Optimal nutrition therapy  Outcome: Ongoing  Nutrition Problem: Inadequate oral intake  Intervention: Food and/or Nutrient Delivery: Continue current diet  Nutritional Goals: 75% or more of meal itnake during LOS
Problem: Nutrition  Goal: Optimal nutrition therapy  Outcome: Ongoing  Nutrition Problem: No nutrition diagnosis at this time  Intervention: Food and/or Nutrient Delivery: Continue NPO, Continue current Tube Feeding  Nutritional Goals: TF to provide % nutrient needs while intubated
Problem: Restraint Use - Nonviolent/Non-Self-Destructive Behavior:  Goal: Absence of restraint indications  Absence of restraint indications   Outcome: Completed Date Met: 11/25/18  Patient extubated no longer pulling at ett  Goal: Absence of restraint-related injury  Absence of restraint-related injury   Outcome: Completed Date Met: 11/25/18  No sign of restraint related injury
Care plan reviewed with patient's family. Patient intubated/sedated.
Impaired:  Goal: Levels of oxygenation will improve  Levels of oxygenation will improve   Outcome: Not Met This Shift  Increased o2 needs this shift - dialysis ordered     Problem: Mental Status - Impaired:  Goal: Mental status will be restored to baseline  Mental status will be restored to baseline   Outcome: Ongoing  Remains sedated on vent - follows commands x 4 extremities     Problem: Pain:  Goal: Pain level will decrease  Pain level will decrease   Outcome: Ongoing  Expresses satisfaction in pain control at present     Comments: Care plan reviewed with patient and family . Family  verbalize understanding of the plan of care and contribute to goal setting.   Pt unable to fully participate at this time
Monitor sedation level and adjust as needed    Problem: Cardiac Output - Decreased:  Goal: Hemodynamic stability will improve  Hemodynamic stability will improve   Outcome: Ongoing  BP has been high today. Cardene drip stopped when dialysis started and pt placed on norvasc. Continue to monitor    Problem: Fluid Volume - Imbalance:  Goal: Absence of imbalanced fluid volume signs and symptoms  Absence of imbalanced fluid volume signs and symptoms   Outcome: Ongoing  Pt able to tolerate dialysis well today. Continue to monitor I and O. Problem: Gas Exchange - Impaired:  Goal: Levels of oxygenation will improve  Levels of oxygenation will improve   Outcome: Ongoing  Oxygen levels stable. FiO2 at 60%, PEEp decreased to 10. Problem: Mental Status - Impaired:  Goal: Mental status will be restored to baseline  Mental status will be restored to baseline   Outcome: Ongoing  Pt remains sedated on vent, but responds to commands appropriately    Comments: Care plan reviewed with patient and family. Patient and family verbalize understanding of the plan of care and contribute to goal setting.
Outcome: Ongoing  Patient 92% on ventilator     Problem: Pain:  Goal: Pain level will decrease  Pain level will decrease   Outcome: Ongoing  Monitoring with CPOT. Fentanyl given     Comments: Unable to review with pt, pt intubated/sedated.  Will review and discuss care plan with family when available
dialysis      Problem: Gas Exchange - Impaired:  Goal: Levels of oxygenation will improve  Levels of oxygenation will improve   Outcome: Ongoing  Decrease in fio2     Problem: Mental Status - Impaired:  Goal: Mental status will be restored to baseline  Mental status will be restored to baseline   Outcome: Ongoing  Patient follows commands times 4 extremities, nods and gestures approprietly       
   Problem: Gas Exchange - Impaired:  Goal: Levels of oxygenation will improve  Levels of oxygenation will improve   Outcome: Ongoing  Decrease in fio2     Problem: Mental Status - Impaired:  Goal: Mental status will be restored to baseline  Mental status will be restored to baseline   Outcome: Ongoing  Patient follows commands times 4 extremities

## 2018-11-28 NOTE — PROGRESS NOTES
Patient resting in bed. Pleasant mood. Scheduled clonidine given early d/t elevated /82 and elevated pulse 118 per primary [employer]. Assisted from bed to chair. SPO2 increased to 91% on 2 L NC. Call light and water within reach. Pathway clear. No other changes in previous assessment.     Vik Diaz RSANN/SN

## 2018-11-28 NOTE — PROGRESS NOTES
every number that was presented and this appeared to make the task more difficult for the patient as he had increased errors at this point. ASSESSMENT:  Assessment: [x]Progressing towards goals          []Not Progressing towards goals       Patient Tolerance of Treatment:   [x]Tolerated well []Tolerated fair []Required rest breaks []Fatigued  Plan for Next Session: orientation, short term recall task, reasoning task, sequencing task, complex attention task. Education:  Learner:  [x]Patient          []Significant Other          []Other  Education provided regarding:  [x]Goals and POC   [x] repetition as a memory strategy    []Home Exercise Program  []Progress and/or discharge information  Method of Education:  [x]Discussion          [x]Demonstration          []Handout          []Other  Evaluation of Education:   [x]Verbalized understanding   []Demonstrates without assistance  []Demonstrates with assistance  [x]Needs further instruction     []No evidence of learning                  [x]No family present      Plan: [x]Continue with current plan of care    []Modify current plan of care as follows:    []Discharge patient    Discharge Location:    Services/Supervision Recommended:     [x]Patient continues to require treatment by a licensed therapist to address functional deficits as outlined in the established plan of care.     Eladio Mathias M.S. [id number] [address]

## 2018-11-28 NOTE — PROGRESS NOTES
distal tip ascending and overlying the expected location of the left jugular vein. 3. There are diminished lung volumes. There is no consolidation or infiltrate. There is no pleural effusion or pulmonary vascular congestion. ASSESSMENT:  1. 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 Continue 2.5% q6h exchanges. 2. Failed kidney transplant on immunosuppressive therapy  3. Essential Hypertension with nephrosclerosis. BP dropping. Meds adjusted per Primary  4. Acute resp failure, S/P extubation  5. DVT/Pulm embolus S/P TPA 11/21, Eliquis  6. Acute on chronic systolic CHF with EF 68%  7. Metabolic acidosis on PO bicarb  8. Hyponatremia, resolved. Ok to stop PO salt tabs  9. Anemia of chronic disease on Aranesp  10. Hyperphosphatemia, Start Renvela 800 mg with meals  11. Deconditioning    BMP in AM  Active Problems:    Acute respiratory distress    ESRD needing dialysis ([address]    Acute pulmonary edema (HCC)    Respiratory failure (HCC)    Anemia in chronic kidney disease, on chronic dialysis (HCC)    DVT (deep vein thrombosis) in pregnancy [facility])  Resolved Problems:    * No resolved hospital problems.  *     Discussed with Dr. Praveena Bello, APRN - CNP 9:26 AM 11/28/2018

## 2018-11-28 NOTE — DISCHARGE INSTR - COC
Other:8000]  Out: 7500 [Other:7500]    Safety Concerns: At Risk for Falls    Impairments/Disabilities:      None    Nutrition Therapy:  Current Nutrition Therapy:   - Oral Diet:  General    Routes of Feeding: Oral  Liquids: Thin Liquids  Daily Fluid Restriction: no  Last Modified Barium Swallow with Video (Video Swallowing Test): not done    Treatments at the Time of Hospital Discharge:   Respiratory Treatments: PRN  Oxygen Therapy:  is not on home oxygen therapy. Ventilator:    - No ventilator support    Rehab Therapies: Physical Therapy, Occupational Therapy and Speech/Language Therapy  Weight Bearing Status/Restrictions: No weight bearing restirctions  Other Medical Equipment (for information only, NOT a DME order):  walker  Other Treatments: CAPD exchanges q6    Patient's personal belongings (please select all that are sent with patient):  None    RN SIGNATURE:  Electronically signed by Mukesh Alavrado RN on 11/28/18 at 10:20 AM    CASE MANAGEMENT/SOCIAL WORK SECTION    Inpatient Status Date: ***    Readmission Risk Assessment Score:  Readmission Risk              Risk of Unplanned Readmission:        22           Discharging to Facility/ Agency   · Name:   · Address:  · Phone:  · Fax:    Dialysis Facility (if applicable)   · Name:  · Address:  · Dialysis Schedule:  · Phone:  · Fax:    / signature: {Esignature:[id number]}    PHYSICIAN SECTION    Prognosis: Good    Condition at Discharge: Stable    Rehab Potential (if transferring to Rehab): Good    Recommended Labs or Other Treatments After Discharge:  monitor renal function    Physician Certification: I certify the above information and transfer of Keyana Shaikh  is necessary for the continuing treatment of the diagnosis listed and that he requires [place] for less 30 days.      Update Admission H&P: No change in H&P    PHYSICIAN SIGNATURE:  Electronically signed by Linnette Palomo MD on 11/28/18 at 1:24

## 2018-11-28 NOTE — CARE COORDINATION
11/28/18, 2:00 PM    Discharge plan discussed by  and . Discharge plan reviewed with patient/ family. Patient/ family verbalize understanding of discharge plan and are in agreement with plan. Understanding was demonstrated using the teach back method. Pt. To be discharge to [facility] today. Denies needs.

## 2018-11-28 NOTE — PROGRESS NOTES
5/18/2015    Hypertension     Left ventricular systolic dysfunction      Past Surgical History:   Procedure Laterality Date    BRONCHOSCOPY N/A 11/15/2018    BRONCHOSCOPY ALVEOLAR LAVAGE performed by Dorene Lovell MD at [address] N/A 11/20/2018    BRONCHOSCOPY ALVEOLAR LAVAGE performed by Dorene Lovell MD at [address]  2014       Restrictions/Precautions:  Fall Risk                    Other position/activity restrictions: O2 (4L 11/26/18)       Prior Level of Function:  ADL Assistance: Independent  Homemaking Assistance: Independent  Ambulation Assistance: Independent  Transfer Assistance: Independent  Additional Comments: Pt reports fully indep PLOF. Subjective       Subjective: Otto Silverio RN approving session this date. Pt sitting on bedside couch and agreeable to OT session. Pain:  Pain Assessment  Patient Currently in Pain: Denies       Objective  Cognition Comment: POt slow to respond at times. Pt with difficulty following through with education on safety during ADL tasks and mobility                                                                   ADL  Grooming: Contact guard assistance (standing at sink for oral care )  LE Dressing: Stand by assistance (donning shoes sitting on couch)               Transfers  Sit to stand: Contact guard assistance (from couch)  Stand to sit: Contact guard assistance (onto couch )       Balance  Sitting Balance: Supervision  Standing Balance: Contact guard assistance (to occasional min A during dynamic standing )     Time: x5 min   Activity: grooming at sink and UE ex in prep for reaching into closets and cupboards. Pt with increased unsteadiness during dynamnic standing wiht no UE support requiring occasional min A. Functional Mobility  Functional - Mobility Device: Rolling Walker  Activity: To/from bathroom  Assist Level: Contact guard assistance  Functional Mobility Comments: and into hallway.  Pt unsteady

## 2018-11-29 LAB — LEGIONELLA SPECIES CULTURE: NORMAL

## 2018-11-30 LAB
MAGNESIUM: 2.9 MG/DL (ref 1.8–2.5)
POTASSIUM SERPL-SCNC: 3.3 MEQ/L (ref 3.6–5)

## 2018-12-01 LAB
ANION GAP SERPL CALCULATED.3IONS-SCNC: 17 MMOL/L (ref 4–12)
BUN BLDV-MCNC: 97 MG/DL (ref 7–20)
CALCIUM SERPL-MCNC: 9.6 MG/DL (ref 8.8–10.5)
CHLORIDE BLD-SCNC: 97 MEQ/L (ref 101–111)
CO2: 24 MEQ/L (ref 21–32)
CREAT SERPL-MCNC: 16.12 MG/DL (ref 0.7–1.3)
CREATININE CLEARANCE: 3
GLUCOSE: 111 MG/DL (ref 70–110)
PHOSPHORUS: 9.4 MG/DL (ref 2.4–4.7)
POTASSIUM SERPL-SCNC: 3.6 MEQ/L (ref 3.6–5)
SODIUM BLD-SCNC: 138 MEQ/L (ref 135–145)

## 2018-12-02 LAB
ANION GAP SERPL CALCULATED.3IONS-SCNC: 18 MMOL/L (ref 4–12)
BUN BLDV-MCNC: 103 MG/DL (ref 7–20)
CALCIUM SERPL-MCNC: 9.9 MG/DL (ref 8.8–10.5)
CHLORIDE BLD-SCNC: 94 MEQ/L (ref 101–111)
CO2: 25 MEQ/L (ref 21–32)
CREAT SERPL-MCNC: 16.32 MG/DL (ref 0.7–1.3)
CREATININE CLEARANCE: 3
GLUCOSE, FASTING: 110 MG/DL (ref 70–110)
POTASSIUM SERPL-SCNC: 4.6 MEQ/L (ref 3.6–5)
SODIUM BLD-SCNC: 137 MEQ/L (ref 135–145)

## 2018-12-03 ENCOUNTER — TELEPHONE (OUTPATIENT)
Dept: FAMILY MEDICINE CLINIC | Age: 33
End: 2018-12-03

## 2018-12-03 NOTE — TELEPHONE ENCOUNTER
[facility] Transitions Initial Follow Up Call    Call within 2 business days of discharge: Yes     Patient: Larry Gunn Patient : 1985   MRN: [id number]  Reason for Admission: There are no discharge diagnoses documented for the most recent discharge.   Discharge Date: 18 RARS: Readmission Risk Score: 22     Spoke with: Adela Nesbitt    Discharge department/facility: [facility]    Non-face-to-face services provided:  Scheduled appointment with PCP-Dr Linda Alvarado    Follow Up  Future Appointments  Date Time Provider Marla Erickson   2018 9:20 AM MD HALEY Crawford [facility] - ANNETTA MEJIA AM OFFENEYENNY IILC   1/15/2019 2:00 PM MD HALEY [facility] - Wiley Caro [address], [facility] ([address] [place] [address])

## 2018-12-06 ENCOUNTER — HOSPITAL ENCOUNTER (OUTPATIENT)
Age: 33
Setting detail: SPECIMEN
Discharge: HOME | End: 2018-12-06
Payer: COMMERCIAL

## 2018-12-06 LAB — POTASSIUM SERPL-SCNC: 5.7 MEQ/L (ref 3.5–5.2)

## 2018-12-06 PROCEDURE — 84132 ASSAY OF SERUM POTASSIUM: CPT

## 2018-12-06 PROCEDURE — 36415 COLL VENOUS BLD VENIPUNCTURE: CPT

## 2018-12-12 NOTE — PATIENT INSTRUCTIONS
feel when you first quit smokeless tobacco are uncomfortable. Your body will miss the nicotine at first, and you may feel short-tempered and grumpy. You may have trouble sleeping or concentrating. Medicine can help you deal with these symptoms. You may struggle with changing your habits and rituals. The last step is the tricky one: Be prepared for the urge to use smokeless tobacco to continue for a time. This is a lot to deal with, but keep at it. You will feel better. Follow-up care is a key part of your treatment and safety. Be sure to make and go to all appointments, and call your doctor if you are having problems. It's also a good idea to know your test results and keep a list of the medicines you take. How can you care for yourself at home? · Ask your family, friends, and coworkers for support. You have a better chance of quitting if you have help and support. · Join a support group for people who are trying to quit using smokeless tobacco.  · Set a quit date. Pick your date carefully so that it is not right in the middle of a big deadline or stressful time. After you quit, do not use smokeless tobacco even once. Get rid of all spit cups, cans, and pouches after your last use. Clean your house and your clothes so that they do not smell of tobacco.  · Learn how to be a non-user. Think about ways you can avoid those things that make you reach for tobacco.  ? Learn some ways to deal with cravings, like calling a friend or going for a walk. Cravings often pass. ? Avoid situations that put you at greatest risk for using smokeless tobacco. For some people, it is hard to spend time with friends without dipping or chewing. For others, they might skip a coffee break with coworkers who smoke or use smokeless tobacco.  ? Change your daily routine. Take a different route to work, or eat a meal in a different place. · Cut down on stress.  Calm yourself or release tension by doing an activity you enjoy, such as reading

## 2018-12-16 NOTE — PROGRESS NOTES
docusate sodium (COLACE, DULCOLAX) 100 MG CAPS  Take 100 mg by mouth 2 times daily             fexofenadine (ALLEGRA) 60 MG tablet  Take 2 tablets by mouth daily.              hydrALAZINE (APRESOLINE) 50 MG tablet  Take 50 mg by mouth 3 times daily Hold if SBP <110             mycophenolate (CELLCEPT) 250 MG capsule  Take 500 mg by mouth 2 times daily              NIFEdipine (PROCARDIA XL) 90 MG extended release tablet  Take 90 mg by mouth daily Hold if SBP is <130             polyethylene glycol (GLYCOLAX) powder  Take 17 g by mouth daily             predniSONE (DELTASONE) 5 MG tablet  Take 5 mg by mouth daily              senna (SENOKOT) 8.6 MG TABS tablet  Take 1 tablet by mouth daily             sevelamer (RENVELA) 800 MG tablet  Take 1 tablet by mouth 3 times daily (with meals)                   Medications marked \"taking\" at this time  Outpatient Prescriptions Marked as Taking for the 12/12/18 encounter (Office Visit) with Nelson Cordova MD   Medication Sig Dispense Refill    carvedilol (COREG) 12.5 MG tablet Take 12.5 mg by mouth daily      dapsone 25 MG tablet Take 50 mg by mouth daily      hydrALAZINE (APRESOLINE) 50 MG tablet Take 50 mg by mouth 3 times daily Hold if SBP <110      polyethylene glycol (GLYCOLAX) powder Take 17 g by mouth daily      apixaban (ELIQUIS) 2.5 MG TABS tablet Take 1 tablet by mouth 2 times daily 60 tablet     cloNIDine (CATAPRES) 0.1 MG tablet Take 1 tablet by mouth 3 times daily 60 tablet 3    docusate sodium (COLACE, DULCOLAX) 100 MG CAPS Take 100 mg by mouth 2 times daily      sevelamer (RENVELA) 800 MG tablet Take 1 tablet by mouth 3 times daily (with meals) 90 tablet 3    senna (SENOKOT) 8.6 MG TABS tablet Take 1 tablet by mouth daily 120 tablet 0    NIFEdipine (PROCARDIA XL) 90 MG extended release tablet Take 90 mg by mouth daily Hold if SBP is <130      carvedilol (COREG) 25 MG tablet Take 25 mg by mouth daily       predniSONE (DELTASONE) 5 MG tablet Take 5 mg

## 2019-01-01 ENCOUNTER — HOSPITAL ENCOUNTER (OUTPATIENT)
Age: 34
Setting detail: SPECIMEN
Discharge: HOME | End: 2019-01-23
Payer: COMMERCIAL

## 2019-01-01 ENCOUNTER — OFFICE VISIT (OUTPATIENT)
Dept: CARDIOLOGY CLINIC | Age: 34
End: 2019-01-01
Payer: COMMERCIAL

## 2019-01-01 ENCOUNTER — OFFICE VISIT (OUTPATIENT)
Dept: FAMILY MEDICINE CLINIC | Age: 34
End: 2019-01-01
Payer: COMMERCIAL

## 2019-01-01 ENCOUNTER — HOSPITAL ENCOUNTER (OUTPATIENT)
Age: 34
Discharge: HOME | End: 2019-01-29
Payer: COMMERCIAL

## 2019-01-01 ENCOUNTER — TELEPHONE (OUTPATIENT)
Dept: CARDIOLOGY CLINIC | Age: 34
End: 2019-01-01

## 2019-01-01 VITALS
DIASTOLIC BLOOD PRESSURE: 92 MMHG | BODY MASS INDEX: 29.1 KG/M2 | WEIGHT: 192 LBS | HEIGHT: 68 IN | SYSTOLIC BLOOD PRESSURE: 144 MMHG | HEART RATE: 102 BPM | OXYGEN SATURATION: 97 %

## 2019-01-01 VITALS
HEART RATE: 87 BPM | OXYGEN SATURATION: 99 % | DIASTOLIC BLOOD PRESSURE: 90 MMHG | WEIGHT: 198.8 LBS | SYSTOLIC BLOOD PRESSURE: 134 MMHG | BODY MASS INDEX: 30.23 KG/M2 | TEMPERATURE: 99 F

## 2019-01-01 VITALS
BODY MASS INDEX: 28.7 KG/M2 | WEIGHT: 189.4 LBS | HEIGHT: 68 IN | DIASTOLIC BLOOD PRESSURE: 108 MMHG | SYSTOLIC BLOOD PRESSURE: 172 MMHG | OXYGEN SATURATION: 94 % | HEART RATE: 91 BPM

## 2019-01-01 VITALS
HEART RATE: 84 BPM | WEIGHT: 187 LBS | SYSTOLIC BLOOD PRESSURE: 138 MMHG | BODY MASS INDEX: 28.34 KG/M2 | DIASTOLIC BLOOD PRESSURE: 98 MMHG | HEIGHT: 68 IN

## 2019-01-01 VITALS
SYSTOLIC BLOOD PRESSURE: 152 MMHG | WEIGHT: 201.8 LBS | HEIGHT: 68 IN | HEART RATE: 80 BPM | BODY MASS INDEX: 30.58 KG/M2 | DIASTOLIC BLOOD PRESSURE: 72 MMHG

## 2019-01-01 VITALS
DIASTOLIC BLOOD PRESSURE: 68 MMHG | SYSTOLIC BLOOD PRESSURE: 100 MMHG | WEIGHT: 175 LBS | BODY MASS INDEX: 26.52 KG/M2 | HEART RATE: 92 BPM | HEIGHT: 68 IN

## 2019-01-01 DIAGNOSIS — N18.6 ESRD ON PERITONEAL DIALYSIS (HCC): Primary | ICD-10-CM

## 2019-01-01 DIAGNOSIS — N18.6 ANEMIA IN CHRONIC KIDNEY DISEASE, ON CHRONIC DIALYSIS (HCC): ICD-10-CM

## 2019-01-01 DIAGNOSIS — I51.9 LEFT VENTRICULAR SYSTOLIC DYSFUNCTION: Primary | ICD-10-CM

## 2019-01-01 DIAGNOSIS — Z99.2 ANEMIA IN CHRONIC KIDNEY DISEASE, ON CHRONIC DIALYSIS (HCC): ICD-10-CM

## 2019-01-01 DIAGNOSIS — Z94.0 RENAL TRANSPLANT, STATUS POST: ICD-10-CM

## 2019-01-01 DIAGNOSIS — I82.591 CHRONIC DEEP VEIN THROMBOSIS (DVT) OF OTHER VEIN OF RIGHT LOWER EXTREMITY (HCC): ICD-10-CM

## 2019-01-01 DIAGNOSIS — I10 ESSENTIAL HYPERTENSION: ICD-10-CM

## 2019-01-01 DIAGNOSIS — D63.1 ANEMIA IN CHRONIC KIDNEY DISEASE, ON CHRONIC DIALYSIS (HCC): ICD-10-CM

## 2019-01-01 DIAGNOSIS — E78.5 DYSLIPIDEMIA: ICD-10-CM

## 2019-01-01 DIAGNOSIS — I50.22 CHF (CONGESTIVE HEART FAILURE), NYHA CLASS II, CHRONIC, SYSTOLIC (HCC): Primary | ICD-10-CM

## 2019-01-01 DIAGNOSIS — Z99.2 ESRD ON PERITONEAL DIALYSIS (HCC): ICD-10-CM

## 2019-01-01 DIAGNOSIS — Z99.2 ESRD ON PERITONEAL DIALYSIS (HCC): Primary | ICD-10-CM

## 2019-01-01 DIAGNOSIS — I51.9 LEFT VENTRICULAR SYSTOLIC DYSFUNCTION: ICD-10-CM

## 2019-01-01 DIAGNOSIS — N18.6 ESRD ON PERITONEAL DIALYSIS (HCC): ICD-10-CM

## 2019-01-01 DIAGNOSIS — I42.9 CARDIOMYOPATHY, UNSPECIFIED TYPE (HCC): ICD-10-CM

## 2019-01-01 DIAGNOSIS — I42.9 CARDIOMYOPATHY, UNSPECIFIED TYPE (HCC): Primary | ICD-10-CM

## 2019-01-01 DIAGNOSIS — H10.32 ACUTE BACTERIAL CONJUNCTIVITIS OF LEFT EYE: Primary | ICD-10-CM

## 2019-01-01 LAB
AFB CULTURE & SMEAR: NORMAL
POTASSIUM SERPL-SCNC: 3.3 MEQ/L (ref 3.5–5.2)
POTASSIUM SERPL-SCNC: 4.2 MEQ/L (ref 3.5–5.2)

## 2019-01-01 PROCEDURE — 99213 OFFICE O/P EST LOW 20 MIN: CPT | Performed by: NURSE PRACTITIONER

## 2019-01-01 PROCEDURE — G8484 FLU IMMUNIZE NO ADMIN: HCPCS | Performed by: FAMILY MEDICINE

## 2019-01-01 PROCEDURE — G8417 CALC BMI ABV UP PARAM F/U: HCPCS | Performed by: FAMILY MEDICINE

## 2019-01-01 PROCEDURE — 4004F PT TOBACCO SCREEN RCVD TLK: CPT | Performed by: NURSE PRACTITIONER

## 2019-01-01 PROCEDURE — G8417 CALC BMI ABV UP PARAM F/U: HCPCS | Performed by: INTERNAL MEDICINE

## 2019-01-01 PROCEDURE — G8427 DOCREV CUR MEDS BY ELIG CLIN: HCPCS | Performed by: FAMILY MEDICINE

## 2019-01-01 PROCEDURE — G8427 DOCREV CUR MEDS BY ELIG CLIN: HCPCS | Performed by: NURSE PRACTITIONER

## 2019-01-01 PROCEDURE — 84132 ASSAY OF SERUM POTASSIUM: CPT

## 2019-01-01 PROCEDURE — G8417 CALC BMI ABV UP PARAM F/U: HCPCS | Performed by: NURSE PRACTITIONER

## 2019-01-01 PROCEDURE — 4004F PT TOBACCO SCREEN RCVD TLK: CPT | Performed by: INTERNAL MEDICINE

## 2019-01-01 PROCEDURE — 36415 COLL VENOUS BLD VENIPUNCTURE: CPT

## 2019-01-01 PROCEDURE — 99213 OFFICE O/P EST LOW 20 MIN: CPT | Performed by: INTERNAL MEDICINE

## 2019-01-01 PROCEDURE — G8482 FLU IMMUNIZE ORDER/ADMIN: HCPCS | Performed by: NURSE PRACTITIONER

## 2019-01-01 PROCEDURE — 4004F PT TOBACCO SCREEN RCVD TLK: CPT | Performed by: FAMILY MEDICINE

## 2019-01-01 PROCEDURE — G8427 DOCREV CUR MEDS BY ELIG CLIN: HCPCS | Performed by: INTERNAL MEDICINE

## 2019-01-01 PROCEDURE — G8482 FLU IMMUNIZE ORDER/ADMIN: HCPCS | Performed by: INTERNAL MEDICINE

## 2019-01-01 PROCEDURE — 99213 OFFICE O/P EST LOW 20 MIN: CPT | Performed by: FAMILY MEDICINE

## 2019-01-01 RX ORDER — GENTAMICIN SULFATE 3 MG/ML
1 SOLUTION/ DROPS OPHTHALMIC 4 TIMES DAILY
Qty: 1 BOTTLE | Refills: 0 | Status: SHIPPED | OUTPATIENT
Start: 2019-01-01 | End: 2019-01-01

## 2019-01-01 RX ORDER — GENTAMICIN SULFATE 1 MG/G
CREAM TOPICAL
Refills: 11 | COMMUNITY
Start: 2019-01-01

## 2019-01-01 RX ORDER — FUROSEMIDE 80 MG
80 TABLET ORAL DAILY
COMMUNITY

## 2019-01-01 RX ORDER — HYDRALAZINE HYDROCHLORIDE 25 MG/1
TABLET, FILM COATED ORAL
Refills: 11 | COMMUNITY
Start: 2019-01-01

## 2019-01-01 RX ORDER — POTASSIUM CHLORIDE 20 MEQ/1
TABLET, EXTENDED RELEASE ORAL
Refills: 3 | COMMUNITY
Start: 2019-01-01

## 2019-01-01 ASSESSMENT — ENCOUNTER SYMPTOMS
COLOR CHANGE: 0
ABDOMINAL PAIN: 0
ABDOMINAL DISTENTION: 0
COLOR CHANGE: 0
CONSTIPATION: 0
ABDOMINAL DISTENTION: 0
EYE DISCHARGE: 1
SHORTNESS OF BREATH: 0
SINUS PRESSURE: 0
COUGH: 0
CHEST TIGHTNESS: 0
COUGH: 0
SHORTNESS OF BREATH: 0
SHORTNESS OF BREATH: 0
APNEA: 0
WHEEZING: 0
ABDOMINAL PAIN: 0
APNEA: 0
ABDOMINAL DISTENTION: 0
NAUSEA: 0
COUGH: 0
EYE PAIN: 0
DIARRHEA: 0
EYE REDNESS: 1
NAUSEA: 0
ABDOMINAL PAIN: 0
CHEST TIGHTNESS: 0
RHINORRHEA: 0
WHEEZING: 0
SORE THROAT: 0
NAUSEA: 0

## 2019-01-01 ASSESSMENT — PATIENT HEALTH QUESTIONNAIRE - PHQ9
1. LITTLE INTEREST OR PLEASURE IN DOING THINGS: 0
SUM OF ALL RESPONSES TO PHQ QUESTIONS 1-9: 0
2. FEELING DOWN, DEPRESSED OR HOPELESS: 0
SUM OF ALL RESPONSES TO PHQ QUESTIONS 1-9: 0
SUM OF ALL RESPONSES TO PHQ9 QUESTIONS 1 & 2: 0

## 2019-03-11 NOTE — TELEPHONE ENCOUNTER
Dr. Judith Dee is wanting renal clearance in order to start patient on Entresto/Aldactone. Please advise.

## 2019-03-14 NOTE — TELEPHONE ENCOUNTER
Entresto lowest dose  Aldactone 25 mg q day per [facility] when patient is stable on [address]

## 2019-03-15 NOTE — TELEPHONE ENCOUNTER
Spoke with patient   He is scheduled for testing for kidney transplant and would like to wait until after that to change any medications   Appointment scheduled 4-1-19

## 2019-04-01 NOTE — PROGRESS NOTES
[facility]       Visit Date: 4/1/2019  Cardiologist:  Dr. Yohana Ordoñez   Primary Care Physician: Dr. Teofilo Nixon MD    Gadiel Biggs is a 26 y.o. male who presents today for:  Chief Complaint   Patient presents with    Congestive Heart Failure     New Patient        HPI:   Gadiel Biggs is a 26 y.o. male who presents to the office for a new patient visit in the heart failure clinic. Hx NICM EF 35-40%, RSVP 34 mmHg, DVT (on Eliquis),CKD, FSGS with previous kidney transplant, being worked up for another kidney transplant in [place]. Last Cr was 16. He is on peritoneal dialysis. His dialysate solution has been adjusted by Dr Shayla Morales. His weight today shows an increase on our records but at home his dry weight is 177, was 180 today. He denies swelling, chest pain, SOB , orthopnea. He sleeps in a abed with 2 pillows. He is still working. He can perform ADLs without SOB or fatigue. He monitors the sodium and fluid. BP was elevated today but has been 130-140 at home.      Patient has:  Last hospital admission related to Heart Failure:  Nov 2018  Chest Pain: no  Worsening SOB/orthopnea/PND: no  Edema: no  Any extra diuretic use: no  Weight gain: no  Compliant checking home weight: yes  Fatigue: no  Abdominal bloating: no - on PD cycler at night   Appetite: good  Difficulty sleeping: no  Cough: no  Compliant checking blood pressure: yes  Any refills on CHF medications needed at this time: see orders     Past Medical History:   Diagnosis Date    Cardiomyopathy ([address] 7/3/2013    life vest-discontinued 9/2013    Chronic kidney disease, stage V ([address]     Dyslipidemia 11/13/2013    FSGS (focal segmental glomerulosclerosis)     H/O kidney transplant 10/20/14    [facility] (living related)    History of renal transplant 5/18/2015    Hypertension     Left ventricular systolic dysfunction      Past Surgical History:   Procedure Laterality Date    BRONCHOSCOPY N/A 11/15/2018 BRONCHOSCOPY ALVEOLAR LAVAGE performed by Cristhian Springer MD at [address] N/A 11/20/2018    BRONCHOSCOPY ALVEOLAR LAVAGE performed by Cristhian Springer MD at [address]  2014     Family History   Problem Relation Age of Onset    Arthritis Mother     Kidney Disease Father     High Blood Pressure Father      Social History     Tobacco Use    Smoking status: Never Smoker    Smokeless tobacco: Current User     Types: Snuff    Tobacco comment: printed to avs   Substance Use Topics    Alcohol use: Yes     Comment: social     Current Outpatient Medications   Medication Sig Dispense Refill    dapsone 25 MG tablet Take 50 mg by mouth daily      hydrALAZINE (APRESOLINE) 50 MG tablet Take 50 mg by mouth 3 times daily Hold if SBP <110      polyethylene glycol (GLYCOLAX) powder Take 17 g by mouth daily      apixaban (ELIQUIS) 2.5 MG TABS tablet Take 1 tablet by mouth 2 times daily 60 tablet     cloNIDine (CATAPRES) 0.1 MG tablet Take 1 tablet by mouth 3 times daily 60 tablet 3    docusate sodium (COLACE, DULCOLAX) 100 MG CAPS Take 100 mg by mouth 2 times daily      sevelamer (RENVELA) 800 MG tablet Take 1 tablet by mouth 3 times daily (with meals) 90 tablet 3    senna (SENOKOT) 8.6 MG TABS tablet Take 1 tablet by mouth daily 120 tablet 0    NIFEdipine (PROCARDIA XL) 90 MG extended release tablet Take 90 mg by mouth daily Hold if SBP is <130      carvedilol (COREG) 25 MG tablet Take 25 mg by mouth daily       predniSONE (DELTASONE) 5 MG tablet Take 5 mg by mouth daily       fexofenadine (ALLEGRA) 60 MG tablet Take 2 tablets by mouth daily. (Patient taking differently: Take 120 mg by mouth daily as needed ) 60 tablet 11     No current facility-administered medications for this visit. Allergies   Allergen Reactions    Sulfa Antibiotics        SUBJECTIVE:   Review of Systems   Constitutional: Negative for activity change, appetite change, fatigue and fever. HENT: Negative for congestion. Respiratory: Negative for apnea, cough, chest tightness, shortness of breath and wheezing. Cardiovascular: Negative for chest pain, palpitations and leg swelling. Gastrointestinal: Negative for abdominal distention, abdominal pain and nausea. Genitourinary: Negative for difficulty urinating and dysuria. Musculoskeletal: Negative for arthralgias and gait problem. Skin: Negative for color change. Neurological: Negative for dizziness, numbness and headaches. Psychiatric/Behavioral: Negative for agitation, confusion and sleep disturbance. The patient is not nervous/anxious. OBJECTIVE:   Today's Vitals:  Pulse 91   Wt 189 lb 6.4 oz (85.9 kg)   SpO2 94%   BMI 28.80 kg/m²     Physical Exam   Constitutional: He is oriented to person, place, and time. He appears well-developed and well-nourished. HENT:   Head: Normocephalic and atraumatic. Eyes: Pupils are equal, round, and reactive to light. Neck: Normal range of motion. No JVD present. Cardiovascular: Normal rate and normal heart sounds. No murmur heard. Pulmonary/Chest: Effort normal. No respiratory distress. He has no rales. Abdominal: Soft. He exhibits no distension. There is no tenderness. PD catheter    Musculoskeletal: He exhibits edema (trace left LE ). He exhibits no tenderness. Neurological: He is alert and oriented to person, place, and time. Skin: Skin is warm and dry. Psychiatric: He has a normal mood and affect. His behavior is normal.   Vitals reviewed. Wt Readings from Last 3 Encounters:   04/01/19 189 lb 6.4 oz (85.9 kg)   03/11/19 187 lb (84.8 kg)   01/15/19 175 lb (79.4 kg)     BP Readings from Last 3 Encounters:   03/11/19 (!) 138/98   01/15/19 100/68   12/12/18 110/70     Pulse Readings from Last 3 Encounters:   04/01/19 91   03/11/19 84   01/15/19 92     Body mass index is 28.8 kg/m².     ECHO:   11/16/18  Summary   Ejection fraction was estimated at 35-40%.  Kimber Worthy medical regimen without changes at present time.      Diagnosis Orders   1. CHF (congestive heart failure), NYHA class II, chronic, systolic (HCC)         Plan:  · Continue Lasix 80 mg daily, Hydralazine 50 mg tid,  Coreg 25 mg bid, Clonidine 0.1 mg tid, Procardia. He is currently being worked up for a kidney transplant, has an appt with [facility] Apr 19. He is on Eliquis for a DVT in November,. Will defer continuation/discontinuation of it per his PCP or consider Hematology workup. This was discussed with Dr Esequiel Cavanaugh. HF Zones reviewed. We will follow with his specialists for now. · Daily weights  · Fluid restriction of 2 Liters per day  · Limit sodium in diet to around 9452-7348 mg/day  · Monitor BP  · Activity as tolerated     Patient was instructed to call the [address] for changes in the following symptoms:   Weight gain of 3 pounds in 1 day or 5 pounds in 1 week  Increased shortness of breath  Shortness of breath while laying down  Cough  Chest pain  Swelling in feet, ankles or legs  Tenderness or bloating in the abdomen  Fatigue   Decreased appetite or nausea   Confusion      Return in about 2 months (around 6/1/2019). or sooner if needed     New HF Pamphlet given and ZONE sheet reviewed    Patient given educational materials - see patient instructions. We discussed the importance of weighing oneself and recording daily. We also discussed the importance of a lowsodium diet, higher sodium foods to avoid and better low sodium food options. Discussed use, benefit, and side effects of prescribed medications. All patient questions answered. Patient verbalizes understanding of plan of care using teach back method, and is agreeable to the treatment plan.        Electronicallysigned by ANGEL Hi CNP on 4/1/2019 at 10:54 AM

## 2019-07-10 NOTE — PATIENT INSTRUCTIONS
You may receive a survey regarding the care you received during your visit. Your input is valuable to us. We encourage you to complete and return your survey. We hope you will choose us in the future for your healthcare needs.     Continue:  · Continue current medications  · Daily weights and record  · Fluid restriction of 2 Liters per day  · Limit sodium in diet to around 2199-6686 mg/day  · Monitor BP  · Activity as tolerated     Call the Heart Failure Clinic for any of the following symptoms: [phone]   Weight gain of 3 pounds in 1 day or 5 pounds in 1 week   Increased shortness of breath   Shortness of breath while laying down   Cough   Chest pain   Swelling in feet, ankles or legs   Tenderness or bloating in the abdomen   Fatigue    Decreased appetite or nausea    Confusion

## 2019-07-10 NOTE — PROGRESS NOTES
[facility]       Visit Date: 7/10/2019  Cardiologist:  Dr. Amara Lovelace  Primary Care Physician: Dr. Mariya Hall MD    Lupe Shelby is a 26 y.o. male who presents today for:  Chief Complaint   Patient presents with    Congestive Heart Failure       HPI:   Lupe Shelby is a 26 y.o. male who presents to the office for a follow up visit in the heart failure clinic. He is on the transplant list for kidney. Hx NICM EF 35-40%, RSVP 34 mmHg, DVT (on Eliquis),CKD, FSGS with previous kidney transplant, being worked up for another kidney transplant in [place]. He states he had a cardiac cath at [place] which was ok. He is on peritoneal dialysis. Dry weight 185. He is up 3 pounds but [place] and he ate and drank more then usual. Denies orthopnea, swelling. He can perform ADLs without SOB or fatigue.      Patient has:  Last hospital admission related to Heart Failure:  Nov 2018  Chest Pain: no  Worsening SOB/orthopnea/PND: no  Edema: no  Any extra diuretic use: no  Weight gain: no  Compliant checking home weight: yes  Fatigue: no  Abdominal bloating: no - PD cycler at night   Appetite: good  Difficulty sleeping: no  Cough: no  Compliant checking blood pressure: yes  Any refills on CHF medications needed at this time: no    Past Medical History:   Diagnosis Date    Cardiomyopathy ([facility] [address] 7/3/2013    life vest-discontinued 9/2013    Chronic kidney disease, stage V ([address]     Dyslipidemia 11/13/2013    FSGS (focal segmental glomerulosclerosis)     H/O kidney transplant 10/20/14    @ [facility] (living related)    History of renal transplant 5/18/2015    Hypertension     Left ventricular systolic dysfunction      Past Surgical History:   Procedure Laterality Date    BRONCHOSCOPY N/A 11/15/2018    BRONCHOSCOPY ALVEOLAR LAVAGE performed by Alanna Garza MD at [address] [place] [address] N/A 11/20/2018    BRONCHOSCOPY ALVEOLAR for apnea, cough, chest tightness, shortness of breath and wheezing. Cardiovascular: Negative for chest pain, palpitations and leg swelling. Gastrointestinal: Negative for abdominal distention, abdominal pain and nausea. Genitourinary: Negative for difficulty urinating and dysuria. Musculoskeletal: Negative for arthralgias and gait problem. Skin: Negative for color change. Neurological: Negative for dizziness, numbness and headaches. Psychiatric/Behavioral: Negative for agitation, confusion and sleep disturbance. The patient is not nervous/anxious. OBJECTIVE:   Today's Vitals:  BP (!) 144/92   Pulse 102   Ht 5' 8\" (1.727 m)   Wt 192 lb (87.1 kg)   SpO2 97%   BMI 29.19 kg/m²     Physical Exam   Constitutional: He is oriented to person, place, and time. He appears well-developed and well-nourished. HENT:   Head: Normocephalic and atraumatic. Eyes: Pupils are equal, round, and reactive to light. Neck: Normal range of motion. No JVD present. Cardiovascular: Normal rate and normal heart sounds. No murmur heard. Pulmonary/Chest: Effort normal. No respiratory distress. He has no rales. Abdominal: Soft. He exhibits no distension. There is no tenderness. PD catheter   Musculoskeletal: He exhibits no edema or tenderness. Neurological: He is alert and oriented to person, place, and time. Skin: Skin is warm and dry. Psychiatric: He has a normal mood and affect. His behavior is normal.   Vitals reviewed. Wt Readings from Last 3 Encounters:   07/10/19 192 lb (87.1 kg)   04/01/19 189 lb 6.4 oz (85.9 kg)   03/11/19 187 lb (84.8 kg)     BP Readings from Last 3 Encounters:   07/10/19 (!) 144/92   04/01/19 (!) 172/108   03/11/19 (!) 138/98     Pulse Readings from Last 3 Encounters:   07/10/19 102   04/01/19 91   03/11/19 84     Body mass index is 29.19 kg/m².     ECHO:   11/16/18   Summary   Ejection fraction was estimated at 35-40%.   There was moderate to severe global at present time.      Diagnosis Orders   1. CHF (congestive heart failure), NYHA class II, chronic, systolic (HCC)         Plan:  · Continue Lasix 80 mg daily, Hydralazine 50 mg tid, Coreg 25 mg bid, Clonidine 0.2 mg tid, on Procardia. He is followed by [facility] for his kidney transplant, Dr Inga Mcintosh for PD and Dr Shaye Solano. I will see him as needed until he gets his kidney transplant as he is being well managed by the other specialists and this is what the patient prefers. HF Zones reviewed. He will call with any issues. · Daily weights  · Fluid restriction of 2 Liters per day  · Limit sodium in diet to around 7433-7735 mg/day  · Monitor BP  · Activity as tolerated     Patient was instructed to call the [address] for changes in the following symptoms:   Weight gain of 3 pounds in 1 day or 5 pounds in 1 week  Increased shortness of breath  Shortness of breath while laying down  Cough  Chest pain  Swelling in feet, ankles or legs  Tenderness or bloating in the abdomen  Fatigue   Decreased appetite or nausea   Confusion      Return in about 6 months (around 1/10/2020). or sooner if needed     Patient given educational materials - see patient instructions. We discussed the importance of weighing oneself and recording daily. We also discussed the importance of a lowsodium diet, higher sodium foods to avoid and better low sodium food options. Discussed use, benefit, and side effects of prescribed medications. All patient questions answered. Patient verbalizes understanding of plan of care using teach back method, and is agreeable to the treatment plan.        Electronicallysigned by ANGEL Sanders CNP on 7/10/2019 at 3:09 PM

## 2019-09-18 NOTE — PROGRESS NOTES
(91.5 kg)   07/10/19 192 lb (87.1 kg)   04/01/19 189 lb 6.4 oz (85.9 kg)     BP Readings from Last 3 Encounters:   09/18/19 (!) 152/90   07/10/19 (!) 144/92   04/01/19 (!) 172/108       Nursing note and vitals reviewed. Physical Exam   Constitutional: Oriented to person, place, and time. Appears well-developed and well-nourished. HENT:   Head: Normocephalic and atraumatic. Eyes: EOM are normal. Pupils are equal, round, and reactive to light. Neck: Normal range of motion. Neck supple. No JVD present. Cardiovascular: Normal rate, regular rhythm, normal heart sounds and intact distal pulses. No murmur heard. Pulmonary/Chest: Effort normal and breath sounds normal. No respiratory distress. No wheezes. No rales. Abdominal: Soft. Bowel sounds are normal. No distension. There is no tenderness. Musculoskeletal: Normal range of motion. No edema. Neurological: Alert and oriented to person, place, and time. No cranial nerve deficit. Coordination normal.   Skin: Skin is warm and dry. Psychiatric: Normal mood and affect.        No results found for: CKTOTAL, CKMB, CKMBINDEX    Lab Results   Component Value Date    WBC 14.7 11/28/2018    RBC 3.11 11/28/2018    HGB 9.0 11/28/2018    HCT 29.3 11/28/2018    MCV 94.2 11/28/2018    MCH 28.9 11/28/2018    MCHC 30.7 11/28/2018    RDW 14.2 06/22/2018     11/28/2018    MPV 9.8 11/28/2018       Lab Results   Component Value Date     12/02/2018    K 4.2 01/29/2019    K 3.6 11/28/2018    CL 94 12/02/2018    CO2 25 12/02/2018     12/02/2018    LABALBU 4.0 11/26/2018    CREATININE 16.32 12/02/2018    CALCIUM 9.9 12/02/2018    LABGLOM 4 11/28/2018    GLUCOSE 111 12/01/2018       Lab Results   Component Value Date    ALKPHOS 54 11/15/2018    ALT 6 11/15/2018    AST 14 11/15/2018    PROT 5.8 11/15/2018    BILITOT 0.5 11/15/2018    BILIDIR <0.2 11/15/2018    LABALBU 4.0 11/26/2018       Lab Results   Component Value Date    MG 2.9 11/30/2018       Lab

## 2020-05-23 NOTE — FLOWSHEET NOTE
11/15/18 1045 11/15/18 1455   Vital Signs   BP (!) 91/58 (!) 125/92   Temp 97.6 °F (36.4 °C) 98 °F (36.7 °C)   Pulse 113 99   Weight 219 lb 5.7 oz (99.5 kg) 212 lb 11.9 oz (96.5 kg)   Weight Method Bed scale Estimated; Bed scale   Percent Weight Change 0 -3.02   Post-Hemodialysis Assessment   Post-Treatment Procedures --  Blood returned;Catheter Capped, clamped with Saline x2 ports   Machine Disinfection Process --  Machine Absence of Bleach Machine; Exterior Machine Disinfection   Total Liters Processed (l/min) --  54 l/min   Dialyzer Clearance --  Heavily streaked   Duration of Treatment (minutes) --  200 minutes   Hemodialysis Intake (ml) --  400 ml   Hemodialysis Output (ml) --  3400 ml   NET Removed (ml) --  3000 ml   Tolerated Treatment --  Fair   3 hour treatment completed. 3L of fluid removed. Patient on the vent sedated. Tolerated fluid removal fairly well with minimal issues. Had a drop in SBP in the 70's, UF turned off at that time, bronc done and time extended to remove 3L. Cath lines flushed with 10 ml of 0.9 NS, clamped and tego secured. Report given to primary floor nurse and charting printed and placed in bin to be scanned into EMR. Take medication as prescribed. Drink plenty of fluids.   Rest.

## 2023-11-27 NOTE — DISCHARGE SUMMARY
the internal jugular vein. See discussion above. **This report has been created using voice recognition software. It may contain minor errors which are inherent in voice recognition technology. **      Final report electronically signed by Dr. Jessica Taylor on 11/16/2018 6:14 AM      XR CHEST PORTABLE   Final Result   1. The distal tip of the endotracheal tube is 4.4 cm above the level of the carlee. 2. The distal tip of the esophageal tube projects over the gastric fundus. 3. There is a right subclavian central venous catheter with the distal tip overlying the right atrium. 4. There is a left subclavian central venous catheter which ascends along the neck beyond the field of imaging. This catheter should be repositioned. 5. There are persistent however slightly improved patchy infiltrates throughout the right chest and within the left perihilar region and left lung base. There is no pleural effusion. 6. Nilson Chaudhari RN was notified at the time of interpretation 11/15/2018 at 0945 hours            **This report has been created using voice recognition software. It may contain minor errors which are inherent in voice recognition technology. **      Final report electronically signed by Dr. Starr Mondragon on 11/15/2018 9:56 AM      XR CHEST PORTABLE   Final Result   1. Appropriately positioned lines and tubes. 2. Asymmetric pulmonary opacities. The appearance is most likely asymmetric pulmonary edema. Pneumonia is not excluded. **This report has been created using voice recognition software. It may contain minor errors which are inherent in voice recognition technology. **      Final report electronically signed by Dr. Davida Springer on 11/15/2018 8:06 AM             Consults:     IP CONSULT TO DIETITIAN  IP CONSULT TO PHARMACY    Disposition:    [x] Home       [] TCU       [] Rehab       [] Psych       [] SNF       [] [place]       [] Other-    Condition at Discharge: Stable    Code
94.8

## (undated) DEVICE — SOLUTION IV IRRIG POUR BRL 0.9% SODIUM CHL 2F7124

## (undated) DEVICE — SINGLE USE BIOPSY VALVE MAJ-210: Brand: SINGLE USE BIOPSY VALVE (STERILE)

## (undated) DEVICE — JELLY,LUBE,STERILE,FLIP TOP,TUBE,2-OZ: Brand: MEDLINE

## (undated) DEVICE — TUBING, SUCTION, 1/4" X 6', STRAIGHT: Brand: MEDLINE

## (undated) DEVICE — CONTAINER,SPECIMEN,PNEU TUBE,4OZ,OR STRL: Brand: MEDLINE

## (undated) DEVICE — SINGLE USE SUCTION VALVE MAJ-209: Brand: SINGLE USE SUCTION VALVE (STERILE)